# Patient Record
Sex: MALE | Race: WHITE | NOT HISPANIC OR LATINO | Employment: UNEMPLOYED | URBAN - METROPOLITAN AREA
[De-identification: names, ages, dates, MRNs, and addresses within clinical notes are randomized per-mention and may not be internally consistent; named-entity substitution may affect disease eponyms.]

---

## 2018-05-02 ENCOUNTER — TELEPHONE (OUTPATIENT)
Dept: PEDIATRIC NEUROLOGY | Facility: CLINIC | Age: 18
End: 2018-05-02

## 2018-05-02 ENCOUNTER — OFFICE VISIT (OUTPATIENT)
Dept: FAMILY MEDICINE | Facility: CLINIC | Age: 18
End: 2018-05-02
Payer: MEDICAID

## 2018-05-02 ENCOUNTER — TELEPHONE (OUTPATIENT)
Dept: FAMILY MEDICINE | Facility: CLINIC | Age: 18
End: 2018-05-02

## 2018-05-02 VITALS
DIASTOLIC BLOOD PRESSURE: 58 MMHG | HEIGHT: 63 IN | HEART RATE: 68 BPM | SYSTOLIC BLOOD PRESSURE: 90 MMHG | RESPIRATION RATE: 16 BRPM | BODY MASS INDEX: 22.19 KG/M2 | WEIGHT: 125.25 LBS

## 2018-05-02 DIAGNOSIS — R56.9 SEIZURE: Primary | ICD-10-CM

## 2018-05-02 LAB
ALBUMIN SERPL BCP-MCNC: 4.5 G/DL
ALP SERPL-CCNC: 76 U/L
ALT SERPL W/O P-5'-P-CCNC: 71 U/L
AMPHETAMINE, QUAL, UR: NEGATIVE
ANION GAP SERPL CALC-SCNC: 9 MMOL/L
AST SERPL-CCNC: 44 U/L
BASOPHILS # BLD AUTO: 0.03 K/UL
BASOPHILS NFR BLD: 0.7 %
BILIRUB SERPL-MCNC: 0.6 MG/DL
BUN SERPL-MCNC: 13 MG/DL
CALCIUM SERPL-MCNC: 10.2 MG/DL
CHLORIDE SERPL-SCNC: 103 MMOL/L
CO2 SERPL-SCNC: 26 MMOL/L
COCAINE (METABOLITE), QUAL, UR: NEGATIVE
CREAT SERPL-MCNC: 0.8 MG/DL
DIFFERENTIAL METHOD: ABNORMAL
EOSINOPHIL # BLD AUTO: 0.1 K/UL
EOSINOPHIL NFR BLD: 1.6 %
ERYTHROCYTE [DISTWIDTH] IN BLOOD BY AUTOMATED COUNT: 13.2 %
EST. GFR  (AFRICAN AMERICAN): >60 ML/MIN/1.73 M^2
EST. GFR  (NON AFRICAN AMERICAN): >60 ML/MIN/1.73 M^2
GLUCOSE SERPL-MCNC: 85 MG/DL
HCT VFR BLD AUTO: 45.1 %
HGB BLD-MCNC: 15.5 G/DL
LYMPHOCYTES # BLD AUTO: 2 K/UL
LYMPHOCYTES NFR BLD: 47.3 %
MCH RBC QN AUTO: 31.1 PG
MCHC RBC AUTO-ENTMCNC: 34.4 G/DL
MCV RBC AUTO: 91 FL
METHAMPHETAMINE, URINE SCREEN: NEGATIVE
MONOCYTES # BLD AUTO: 0.4 K/UL
MONOCYTES NFR BLD: 8.7 %
NEUTROPHILS # BLD AUTO: 1.8 K/UL
NEUTROPHILS NFR BLD: 41.7 %
OPIATE SCREEN, URINE: NEGATIVE
PLATELET # BLD AUTO: 263 K/UL
PMV BLD AUTO: 12.5 FL
POTASSIUM SERPL-SCNC: 4.1 MMOL/L
PROT SERPL-MCNC: 7.7 G/DL
RBC # BLD AUTO: 4.98 M/UL
SODIUM SERPL-SCNC: 138 MMOL/L
THC, UR: POSITIVE
WBC # BLD AUTO: 4.27 K/UL

## 2018-05-02 PROCEDURE — 99999 PR PBB SHADOW E&M-EST. PATIENT-LVL III: CPT | Mod: PBBFAC,,, | Performed by: FAMILY MEDICINE

## 2018-05-02 PROCEDURE — 85025 COMPLETE CBC W/AUTO DIFF WBC: CPT

## 2018-05-02 PROCEDURE — 93010 ELECTROCARDIOGRAM REPORT: CPT | Mod: ,,, | Performed by: INTERNAL MEDICINE

## 2018-05-02 PROCEDURE — 99215 OFFICE O/P EST HI 40 MIN: CPT | Mod: S$PBB,,, | Performed by: FAMILY MEDICINE

## 2018-05-02 PROCEDURE — 80305 DRUG TEST PRSMV DIR OPT OBS: CPT | Mod: PBBFAC | Performed by: FAMILY MEDICINE

## 2018-05-02 PROCEDURE — 93005 ELECTROCARDIOGRAM TRACING: CPT | Mod: PBBFAC | Performed by: FAMILY MEDICINE

## 2018-05-02 PROCEDURE — 99213 OFFICE O/P EST LOW 20 MIN: CPT | Mod: PBBFAC | Performed by: FAMILY MEDICINE

## 2018-05-02 PROCEDURE — 80053 COMPREHEN METABOLIC PANEL: CPT

## 2018-05-02 PROCEDURE — 36415 COLL VENOUS BLD VENIPUNCTURE: CPT | Mod: PBBFAC

## 2018-05-02 RX ORDER — DIAZEPAM 2.5 MG/.5ML
2.5 GEL RECTAL DAILY PRN
Qty: 1 EACH | Refills: 1 | Status: SHIPPED | OUTPATIENT
Start: 2018-05-02 | End: 2018-05-18

## 2018-05-02 NOTE — TELEPHONE ENCOUNTER
----- Message from Mercedes Cisneros sent at 5/2/2018  9:55 AM CDT -----  Contact: Kelsy rosen/ Dr Unger [phone]  Needs Medical Advice    Who Called: Kelsy rosen/ Dr Unger [phone]  Symptoms (please be specific):    How long has patient had these symptoms:    Pharmacy name and phone # if needed:    Communication Preference (Call Back # and timeframe):  Additional Information:   Would like to know if the  will be willing to see the Pt as a NP although he is an adult. Please call to advise

## 2018-05-02 NOTE — TELEPHONE ENCOUNTER
Pediatric Neurology/[facility] has declined pt, over 16yr of age, Adult Neuro has pt pt on waiting list, and will send pt message when appt is available.

## 2018-05-02 NOTE — PROGRESS NOTES
Subjective:       Patient ID: Reed Middleton is a 18 y.o. male.    Chief Complaint: Follow-up and Generalized Body Aches    HPI  18 year old male comes in with mom c/o recurrence of seizure like activity. He, as a child, was diagnosed with epilepsy and was treated in [id number] because of this. Mom describes seizures as complex partial seizures that were never identified on eeg. He has never had an MRI and was placed on medication by his neurologist when he was in [place]. This medication seemed to have worsened his symptoms so he stopped it. In February of 2012 he had his last episode. Since being in [place] he has really had no issues. His only other medical history is of adhd and it should be noted that he also had 504 accommodations. Over the last 3 months he has had increased seizure like activity. He says his vision decreases and then he feels like he will fall. He does actually lose consciousness and has had biting of the tongue. Witnessed episodes by mom look like flailing of his arms and then some fatigue and confusion afterwards. He seems to want to get his clothes off and has some awareness of what is going on because he has nausea and vomiting during these episodes. No bowel/bladder incontinence. No new medications. He occasionally drinks alcohol and smokes marijuana.    PMH, PSH, ALLERGIES, SH, FH reviewed in nurse's notes above  Medications reconciled in the nurse's notes    Review of Systems   Constitutional: Negative for chills and fever.   HENT: Negative for congestion, ear pain, postnasal drip, rhinorrhea, sore throat and trouble swallowing.    Eyes: Negative for redness and itching.   Respiratory: Negative for cough, shortness of breath and wheezing.    Cardiovascular: Negative for chest pain and palpitations.   Gastrointestinal: Positive for vomiting. Negative for abdominal pain, diarrhea and nausea.   Genitourinary: Negative for dysuria and frequency.   Skin: Negative for rash.    Neurological: Positive for seizures. Negative for weakness and headaches.       Objective:      Physical Exam   Constitutional: He is oriented to person, place, and time. He appears well-developed. No distress.   HENT:   Head: Normocephalic and atraumatic.   Eyes: Conjunctivae are normal. Pupils are equal, round, and reactive to light.   Neck: Normal range of motion. Neck supple. No thyromegaly present.   Cardiovascular: Normal rate, regular rhythm, normal heart sounds and intact distal pulses.    Pulmonary/Chest: Effort normal and breath sounds normal. No respiratory distress. He has no wheezes.   Abdominal: Soft. Bowel sounds are normal. There is no tenderness.   Musculoskeletal: Normal range of motion. He exhibits no edema.   Lymphadenopathy:     He has no cervical adenopathy.   Neurological: He is alert and oriented to person, place, and time.   Skin: Skin is warm and dry. No rash noted.   Psychiatric: He has a normal mood and affect. His behavior is normal.   Nursing note and vitals reviewed.       Assessment/Plan:       Problem List Items Addressed This Visit     None      Visit Diagnoses     Seizure    -  Primary    Relevant Orders    POCT rapid drug screen (Completed)    POCT Urine Sediment Exam    POCT URINE DIPSTICK WITH MICROSCOPE, AUTOMATED    CBC auto differential    Comprehensive metabolic panel    MRI Brain W WO Contrast    Ambulatory referral to Neurology    EKG 12-lead (Completed)    Holter monitor - 48 hour      RTC if condition acutely worsens or any other concerns, otherwise RTC as scheduled in 1 week

## 2018-05-10 ENCOUNTER — HOSPITAL ENCOUNTER (OUTPATIENT)
Dept: RADIOLOGY | Facility: HOSPITAL | Age: 18
Discharge: HOME | End: 2018-05-10
Attending: FAMILY MEDICINE
Payer: MEDICAID

## 2018-05-10 ENCOUNTER — HOSPITAL ENCOUNTER (OUTPATIENT)
Dept: PULMONOLOGY | Facility: HOSPITAL | Age: 18
Discharge: HOME | End: 2018-05-10
Attending: FAMILY MEDICINE
Payer: MEDICAID

## 2018-05-10 DIAGNOSIS — R56.9 SEIZURE: ICD-10-CM

## 2018-05-10 PROCEDURE — A9585 GADOBUTROL INJECTION: HCPCS | Performed by: FAMILY MEDICINE

## 2018-05-10 PROCEDURE — 70553 MRI BRAIN STEM W/O & W/DYE: CPT | Mod: TC

## 2018-05-10 PROCEDURE — 25500020 PHARM REV CODE 255: Performed by: FAMILY MEDICINE

## 2018-05-10 PROCEDURE — 93227 XTRNL ECG REC<48 HR R&I: CPT | Mod: S$PBB,,, | Performed by: INTERNAL MEDICINE

## 2018-05-10 PROCEDURE — 93226 XTRNL ECG REC<48 HR SCAN A/R: CPT

## 2018-05-10 PROCEDURE — 70553 MRI BRAIN STEM W/O & W/DYE: CPT | Mod: 26,,, | Performed by: RADIOLOGY

## 2018-05-10 RX ORDER — GADOBUTROL 604.72 MG/ML
5 INJECTION INTRAVENOUS
Status: COMPLETED | OUTPATIENT
Start: 2018-05-10 | End: 2018-05-10

## 2018-05-10 RX ADMIN — GADOBUTROL 5 ML: 604.72 INJECTION INTRAVENOUS at 10:05

## 2018-05-11 ENCOUNTER — TELEPHONE (OUTPATIENT)
Dept: FAMILY MEDICINE | Facility: CLINIC | Age: 18
End: 2018-05-11

## 2018-05-11 NOTE — TELEPHONE ENCOUNTER
Pt on wait list for appt, pt unable to wait, having seizures and positive for mass in brain/MRI, appt made for 5/18/18@9am, at , message left with pt's Mom, with appt date/time/location, verbalizes understanding

## 2018-05-16 ENCOUNTER — TELEPHONE (OUTPATIENT)
Dept: FAMILY MEDICINE | Facility: CLINIC | Age: 18
End: 2018-05-16

## 2018-05-16 NOTE — TELEPHONE ENCOUNTER
Spoke with mom. Says that anca came in to the living room saying 'i know I am going to have a seizure.' he then laid down on the floor. He put one arm behind him and then postured for about 30-40 sec. Afterwards, he was disoriented and vomited profusely. 30 minutes later he was back to normal. He has a neurology appt on Friday. EMS was called and his vitals were fine.   jeanine

## 2018-05-16 NOTE — TELEPHONE ENCOUNTER
----- Message from Laly Echols MA sent at 2018  1:27 PM CDT -----  Contact: MOther-Loni Middleton  MRN: [id number]  : 2000  PCP: Laly Unger  Home Phone      [phone]  Work Phone      Not on file.  Mobile          [phone]      MESSAGE: Mother states he had a horrible seizure today. She called 911, they didn't take him to the hospital.  And wants to know if Dr Unger can help get him in with the specialist tomorrow.  Phone: [phone]

## 2018-05-18 ENCOUNTER — OFFICE VISIT (OUTPATIENT)
Dept: NEUROLOGY | Facility: CLINIC | Age: 18
End: 2018-05-18
Payer: MEDICAID

## 2018-05-18 ENCOUNTER — HOSPITAL ENCOUNTER (OUTPATIENT)
Dept: NEUROLOGY | Facility: CLINIC | Age: 18
Discharge: HOME | End: 2018-05-18
Payer: MEDICAID

## 2018-05-18 VITALS
BODY MASS INDEX: 22.54 KG/M2 | HEIGHT: 63 IN | HEART RATE: 52 BPM | WEIGHT: 127.19 LBS | SYSTOLIC BLOOD PRESSURE: 115 MMHG | DIASTOLIC BLOOD PRESSURE: 59 MMHG

## 2018-05-18 DIAGNOSIS — G40.309 EPILEPSY SEIZURE, GENERALIZED, CONVULSIVE: ICD-10-CM

## 2018-05-18 DIAGNOSIS — G40.309 EPILEPSY SEIZURE, GENERALIZED, CONVULSIVE: Primary | ICD-10-CM

## 2018-05-18 PROCEDURE — 99212 OFFICE O/P EST SF 10 MIN: CPT | Mod: PBBFAC,25 | Performed by: PSYCHIATRY & NEUROLOGY

## 2018-05-18 PROCEDURE — 99205 OFFICE O/P NEW HI 60 MIN: CPT | Mod: S$PBB,,, | Performed by: PSYCHIATRY & NEUROLOGY

## 2018-05-18 PROCEDURE — 99999 PR PBB SHADOW E&M-EST. PATIENT-LVL II: CPT | Mod: PBBFAC,,, | Performed by: PSYCHIATRY & NEUROLOGY

## 2018-05-18 PROCEDURE — 95816 EEG AWAKE AND DROWSY: CPT | Mod: PBBFAC | Performed by: PSYCHIATRY & NEUROLOGY

## 2018-05-18 PROCEDURE — 95813 EEG EXTND MNTR 61-119 MIN: CPT | Mod: 26,S$PBB,, | Performed by: PSYCHIATRY & NEUROLOGY

## 2018-05-18 PROCEDURE — 95813 PR EEG, EXTENDED, 61-119 MINS: ICD-10-PCS | Mod: 26,S$PBB,, | Performed by: PSYCHIATRY & NEUROLOGY

## 2018-05-18 RX ORDER — ZONISAMIDE 100 MG/1
300 CAPSULE ORAL NIGHTLY
Qty: 90 CAPSULE | Refills: 11 | Status: SHIPPED | OUTPATIENT
Start: 2018-05-18 | End: 2019-04-01

## 2018-05-18 NOTE — PROGRESS NOTES
Name: Reed Middleton  MRN: [id number]   CSN: [id number]      Date: 05/18/2018    HISTORY OF PRESENT ILLNESS (HPI)  The patient is a 18 y.o. WM.  The patient was accompanied today by his mother who provided some of the history.    Reed comes in with his mother today for help regarding control of seizures.    Apparently, Reed had seizures as a child, particularly when he was about 10   years old for about a year.  He struggled with seizures and had been medicated,   but seemed to outgrow them and went somewhere between four and six years off   medications with no seizures as a teenager.  Apparently, his seizures have   returned in the past two months, however, and he has had four to six seizures so   far this year.  His last seizure occurred last Wednesday, which was apparently   a generalized tonic-clonic seizure resulting in a two hour postictal period as   well.    In the past, Reed was treated with carbamazepine back in 2012, but he states   that this made his seizures worse.  He has had generalized tonic-clonic seizures   as well as staring spells, but I cannot see that he has been formally diagnosed   with a seizure disorder thus far.  It is unclear whether he had complex partial   epilepsy or perhaps primary generalized epilepsy.  He has had an aura in the   past of a feeling that a seizure is eminent.  At times, he lies down on the   floor prior to the onset of the seizure before going into tonic posturing.  He   has also vomited either during or immediately after seizures in the past.  He   has had tongue biting, which has been moderately severe involving the lateral   aspect of the tongue.  The convulsive activity is described as flailing of the   arms and stiffening and he also seems to want to move his clothes at times   during the seizures.  He occasionally drinks alcohol and smokes marijuana.      DEVON/KRYSTIAN  dd: 05/22/2018 16:13:19 (CDT)  td: 05/22/2018 16:56:49 (CDT)  Doc ID   #[id number]  Job ID  #[id number]    CC:       [id number]          Seizure Triggers  Sleep Deprivation - None  Other medications - None  Psych/stress - None  Photic stimulation - None  Hyperventilation - None  Medical Problems - None  Menses - No  Sensory Stimulation (light, sound, etc) - None  Missed dose of meds - None    AED Treatments  Present regimen  None    Prior treatments  CBZ    Not tried  acetazolamide (Diamox, AZM)  amantadine  carbamazepine (Tegretol, CBZ)  clobezam (Onfi or Frizium, CLB)  ethosuximide (Zarontin, ESM)  eslicarbazine (Aptiom, ESL)  felbamate (felbatol, FBM)  gabapentin (Neurontin, GBP)  lacosamide (Vimpat, LCM)   lamotrigine (Lamictal, LTG)   levetiracetam (Keppra, LEV)  methsuximide (Celontin, MSM)  methyphenytoin (Mesantion, MHT)  oxcarbazepine (Trileptal OXC)  perampanel (Fycompa, FCP)   phenobarbital (Pb)  phenytoin (Dilantin, PHT)  pregabalin (Lyrica, PGB)  primidone (Mysoline, PRM)  retigabine (Potiga, RTG)  rufinamide (Banzel, RUF)  tiagabine (Gabatril,  TGB)  topiramate (Topamax, TPM)  viagabatrin, (Sabril, VGB)  vagal nerve stimulator (VNS)  valproic acid (Depakote, VPA)  zonisamide (Zonegran, ZNS)  Benzodiazepines  diazepam - rectal (Diastatl)  diazepam - oral (Valium, DZ)  clonazepam (Klonopin, CZP)  clorazepate (Tranxene, CLZ)  Ativan  Brain Stimulation  Vagal Nerve Stimulation-n/a  DBS- n/a    Compliance method  Memory - yes  Mom or Spouse - Yes  Pill Box - no  Ant calendar - no  Turn over medication bottle - no  Phone alarm - no    Seizure Evaluation  EEG Routine -   EEG Ambulatory -   EEG\Video Monitoring -   MRI/MRA -   CT/CTA Scan -   PET Scan -   Neuropsychological evaluation -   DEXA Scan    Potential Epilepsy Risk Factors:   Pregnancy/Labor/Delivery - full term uncomplicated pregnancy labor and vaginal delivery  Febrile seizures - none  Head injury  - none  CNS infection - none     Stroke - none  Family Hx of Sz - none    PAST MEDICAL HISTORY:   Active Ambulatory Problems     Diagnosis Date Noted     No Active Ambulatory Problems     Resolved Ambulatory Problems     Diagnosis Date Noted    No Resolved Ambulatory Problems     Past Medical History:   Diagnosis Date    ADHD (attention deficit hyperactivity disorder)     Epilepsy         PAST SURGICAL HISTORY:   Past Surgical History:   Procedure Laterality Date    ADENOIDECTOMY      TONSILLECTOMY          FAMILY HISTORY:   Family History   Problem Relation Age of Onset    Hypertension Father     Heart disease Father          SOCIAL HISTORY:   Social History     Social History    Marital status: Single     Spouse name: N/A    Number of children: N/A    Years of education: N/A     Occupational History    Not on file.     Social History Main Topics    Smoking status: Never Smoker    Smokeless tobacco: Not on file    Alcohol use Not on file    Drug use: Unknown    Sexual activity: Not on file     Other Topics Concern    Not on file     Social History Narrative    No narrative on file        SUBSTANCE USE:  Social History     Social History Main Topics    Smoking status: Never Smoker    Smokeless tobacco: Not on file    Alcohol use Not on file    Drug use: Unknown    Sexual activity: Not on file      Social History   Substance Use Topics    Smoking status: Never Smoker    Smokeless tobacco: Not on file    Alcohol use Not on file        ALLERGIES: Patient has no known allergies.       Review of Systems   Constitutional: Negative for chills, diaphoresis, fever, malaise/fatigue and weight loss.   HENT: Negative for ear pain, hearing loss, nosebleeds and tinnitus.    Eyes: Negative for blurred vision, double vision, photophobia and pain.   Respiratory: Negative for cough, hemoptysis and shortness of breath.    Cardiovascular: Negative for chest pain, palpitations, orthopnea and leg swelling.   Gastrointestinal: Negative for abdominal pain, blood in stool, constipation, diarrhea, heartburn, nausea and vomiting.   Genitourinary: Negative for  "dysuria and hematuria.   Musculoskeletal: Negative for falls, joint pain and myalgias.   Skin: Negative for itching and rash.   Neurological: Positive for headaches. Negative for dizziness, tingling, tremors, sensory change, speech change, focal weakness, loss of consciousness and weakness.   Endo/Heme/Allergies: Negative for environmental allergies. Does not bruise/bleed easily.   Psychiatric/Behavioral: Negative for depression, hallucinations, memory loss and substance abuse. The patient is not nervous/anxious and does not have insomnia.          PHYSICAL EXAM:    BP (!) 115/59   Pulse (!) 52   Ht 5' 3" (1.6 m)   Wt 57.7 kg (127 lb 3.3 oz)   BMI 22.53 kg/m²       Neurologic Exam      Higher Cortical Function:    Patient is a well developed, pleasant, well groomed individual appearing their stated age  Oriented - intact to person, place and time    Fund of knowledge was appropriate.    Language - Speech was fluent without evidence for an aphasia.  R-L Orientation - Intact   Agnosias, agraphesthesia, or astereognosis - not present.   Cranial Nerves II - XII:    EOMs were intact with normal smooth and no nystagmus.    PERRLA.  Motor - facial movement was symmetrical and normal.    Facial sensory - Light touch and pin prick sensations were normal.    Hearing was normal.  Palate moved well and was symmetrical    Tongue movement was full & the patient could speak without difficulty.  Motor: Power, bulk and tone were normal in all extremities.  Coordination:  Normal  Gait:  Normal  Tremor: resting, postural, intentional - none  Cardiovascular:  No edema  Skin: No rash         IMPRESSION/PLAN:  Reed had seizures as a child and after a honeymoon period has apparently begun   re-experiencing seizures including generalized tonic-clonic seizures over the   past few months.  He has had four to six seizures this year, most of which have   been convulsive.  The differential diagnosis does include nonepileptic events,   but " this seems less likely given the history and semiology and tongue biting.    He did poorly on carbamazepine in the past, which suggests that he might have a   primary generalized epilepsy, particularly with the age of onset here.  At this   point, I recommend obtaining a prolonged EEG during which time we can hopefully   capture some sleep and if he does have a primary generalized syndrome, this may   be sufficient for diagnosis.  If that study is unhelpful, then we may need to   consider video EEG monitoring.  I will also prescribe a new anticonvulsant for   Reed today as he is not currently treated with regular therapy.  I am going to   begin zonisamide at a dose of 300 mg at bedtime initially and order labs after   he reaches a stable level in a couple of weeks.  We will also check other labs   including vitamin levels at that point.  Depending on the results of the EEG, we   will decide whether to proceed with video EEG monitoring and depending on his   clinical response to zonisamide, we may further titrate upward with guidance of   the blood level.  Reed also complains of occasional headaches and so hopefully   the zonisamide will help as a headache preventative medicine as well.      MEGAN  dd: 05/22/2018 16:17:43 (CDT)  td: 05/22/2018 17:06:38 (CDT)  Doc ID   #[id number]  Job ID #[id number]    CC:       [id number]

## 2018-05-21 NOTE — PROCEDURES
DATE OF SERVICE:  05/18/2018    EEG NUMBER:  OC .    REQUESTED BY:  Dr. Ferrara.    ELECTROENCEPHALOGRAM REPORT     METHODOLOGY:  Electroencephalographic (EEG) recording is recorded with   electrodes placed according to the International 10-20 placement system.  Thirty   two (32) channels of digital signal (sampling rate of 512/sec), including T1   and T2, were simultaneously recorded from the scalp and may include EKG, EMG,   and/or eye monitors.  Recording band pass was 0.1 to 512 Hz.  Digital video   recording of the patient is simultaneously recorded with the EEG.  The patient   is instructed to report clinical symptoms which may occur during the recording   session.  EEG and video recording are stored and archived in digital format.    Activation procedures, which include photic stimulation, hyperventilation and   instructing patients to perform simple tasks, are done in selected patients.    The EEG is displayed on a monitor screen and can be reviewed using different   montages.  Computer assisted-analysis is employed to detect spike and   electrographic seizure activity.   The entire record is submitted for computer   analysis.  The entire recording is visually reviewed, and the times identified   by computer analysis as being spikes or seizures are reviewed again.    Compressed spectral analysis (CSA) is also performed on the activity recorded   from each individual channel.  This is displayed as a power display of   frequencies from 0 to 30 Hz over time.   The CSA is reviewed looking for   asymmetries in power between homologous areas of the scalp, then compared with   the original EEG recording.    Bluwan software was also utilized in the review of this study.  This software   suite analyzes the EEG recording in multiple domains.  Coherence and rhythmicity   are computed to identify EEG sections which may contain organized seizures.    Each channel undergoes analysis to detect the presence of spike  and sharp waves   which have special and morphological characteristics of epileptic activity.  The   routine EEG recording is converted from special into frequency domain.  This is   then displayed comparing homologous areas to identify areas of significant   asymmetry.  Algorithm to identify non-cortically generated artifact is used to   separate artifact from the EEG.      RECORDING TIMES:  Start on 05/18/2018 at 11:4 and end at 12:14.  A total of 1 hour and 10 minutes of EEG recording was obtained.    EEG FINDINGS:  The patient was awake and near the onset and a very rhythmic 10   Hz posterior dominant rhythm was seen in the occipital leads with some spread at   times into the posterior temporal area.  Irregular beta was present diffusely.    Sleep was encountered with a posterior dominant rhythm being replaced by   generalized mixture of midrange theta and beta activity.  This was later   punctuated by V waves, sleep spindles and generalized bursts of vertex maximum   delta.  The waking and sleeping background was symmetrical and there were no   lateralized or focal changes and no spike or sharp wave activity seen.    Activation procedures consisted of 3 minutes of hyperventilation, which produced   some mild disorganization, which was symmetrical, which normalized quickly   after overbreathing ceased.  Intermittent photic stimulation was carried out,   which produced a moderate driving response without provoking pathological   discharges.    IMPRESSION:  Normal waking and sleeping EEG.    CLINICAL CORRELATION:  The patient is a 18-year-old male with clinical events   which have been thought to be either epilepsy with focal onset and secondary   generalization or nonepileptic events.  This tracing shows no evidence of   cortical dysfunction nor an irritative process.      RR/HN  dd: 05/21/2018 15:05:59 (CDT)  td: 05/21/2018 15:43:25 (CDT)  Doc ID   #[id number]  Job ID #[id number]    CC:

## 2018-05-25 ENCOUNTER — TELEPHONE (OUTPATIENT)
Dept: FAMILY MEDICINE | Facility: CLINIC | Age: 18
End: 2018-05-25

## 2018-06-12 ENCOUNTER — OFFICE VISIT (OUTPATIENT)
Dept: FAMILY MEDICINE | Facility: CLINIC | Age: 18
End: 2018-06-12
Payer: MEDICAID

## 2018-06-12 VITALS
HEART RATE: 60 BPM | RESPIRATION RATE: 16 BRPM | BODY MASS INDEX: 21.83 KG/M2 | DIASTOLIC BLOOD PRESSURE: 54 MMHG | SYSTOLIC BLOOD PRESSURE: 90 MMHG | WEIGHT: 123.25 LBS

## 2018-06-12 DIAGNOSIS — G40.909 SEIZURE DISORDER: Primary | ICD-10-CM

## 2018-06-12 DIAGNOSIS — G40.309 EPILEPSY SEIZURE, GENERALIZED, CONVULSIVE: ICD-10-CM

## 2018-06-12 DIAGNOSIS — R74.8 ELEVATED LIVER ENZYMES: ICD-10-CM

## 2018-06-12 LAB
ALBUMIN SERPL BCP-MCNC: 4.3 G/DL
ALP SERPL-CCNC: 72 U/L
ALT SERPL W/O P-5'-P-CCNC: 28 U/L
ANION GAP SERPL CALC-SCNC: 9 MMOL/L
AST SERPL-CCNC: 23 U/L
BILIRUB SERPL-MCNC: 0.5 MG/DL
BUN SERPL-MCNC: 12 MG/DL
CALCIUM SERPL-MCNC: 9.7 MG/DL
CHLORIDE SERPL-SCNC: 113 MMOL/L
CO2 SERPL-SCNC: 20 MMOL/L
CREAT SERPL-MCNC: 0.8 MG/DL
EST. GFR  (AFRICAN AMERICAN): >60 ML/MIN/1.73 M^2
EST. GFR  (NON AFRICAN AMERICAN): >60 ML/MIN/1.73 M^2
GLUCOSE SERPL-MCNC: 95 MG/DL
POTASSIUM SERPL-SCNC: 4.3 MMOL/L
PROT SERPL-MCNC: 7.2 G/DL
SODIUM SERPL-SCNC: 142 MMOL/L

## 2018-06-12 PROCEDURE — 82607 VITAMIN B-12: CPT

## 2018-06-12 PROCEDURE — 84252 ASSAY OF VITAMIN B-2: CPT

## 2018-06-12 PROCEDURE — 99212 OFFICE O/P EST SF 10 MIN: CPT | Mod: PBBFAC | Performed by: FAMILY MEDICINE

## 2018-06-12 PROCEDURE — 82306 VITAMIN D 25 HYDROXY: CPT

## 2018-06-12 PROCEDURE — 36415 COLL VENOUS BLD VENIPUNCTURE: CPT | Mod: PBBFAC

## 2018-06-12 PROCEDURE — 99213 OFFICE O/P EST LOW 20 MIN: CPT | Mod: S$PBB,,, | Performed by: FAMILY MEDICINE

## 2018-06-12 PROCEDURE — 84207 ASSAY OF VITAMIN B-6: CPT

## 2018-06-12 PROCEDURE — 84425 ASSAY OF VITAMIN B-1: CPT

## 2018-06-12 PROCEDURE — 82746 ASSAY OF FOLIC ACID SERUM: CPT

## 2018-06-12 PROCEDURE — 99999 PR PBB SHADOW E&M-EST. PATIENT-LVL II: CPT | Mod: PBBFAC,,, | Performed by: FAMILY MEDICINE

## 2018-06-12 PROCEDURE — 80053 COMPREHEN METABOLIC PANEL: CPT

## 2018-06-12 PROCEDURE — 80203 DRUG SCREEN QUANT ZONISAMIDE: CPT

## 2018-06-12 NOTE — PROGRESS NOTES
Subjective:       Patient ID: Reed Middleton is a 18 y.o. male.    Chief Complaint: Follow-up    Pt is a 18 y.o. male who presents for evaluation and management of   Encounter Diagnoses   Name Primary?    Seizure disorder Yes    Elevated liver enzymes    .Saw Neurology for new seizure activity in this patient that has a h/o seizures as a child. His last seizure was in 2012 before this recent recurrence   Started on zonegran 3 weeks ago with [facility]. No seizure since last visit, but a little nauseated on new med    Labs done by Dr. SYLVESTER last visit showed elevated liver enzymes. He says he was using a lot of tylenol at the time for h/a. He has not used tylenol much in the last couple of weeks. Doesn't drink much ETOH. No other Drug use besides Marijuana     Doing well on current meds. Denies any side effects. Prevention is up to date.    Review of Systems   Gastrointestinal: Positive for nausea.   Neurological: Positive for seizures and headaches.        Headache improved with Zonegran        Objective:      Physical Exam   Constitutional: He is oriented to person, place, and time. He appears well-developed and well-nourished.   HENT:   Head: Normocephalic and atraumatic.   Right Ear: External ear normal.   Left Ear: External ear normal.   Nose: Nose normal.   Mouth/Throat: Oropharynx is clear and moist.   Eyes: Conjunctivae and EOM are normal. Pupils are equal, round, and reactive to light. Right eye exhibits no discharge. Left eye exhibits no discharge. No scleral icterus.   Neck: Normal range of motion. Neck supple. No JVD present. No tracheal deviation present. No thyromegaly present.   Cardiovascular: Normal rate, regular rhythm, normal heart sounds and intact distal pulses.    No murmur heard.  Pulmonary/Chest: Effort normal and breath sounds normal. No respiratory distress. He has no wheezes. He has no rales. He exhibits no tenderness.   Abdominal: Soft. Bowel sounds are normal. He exhibits no  distension and no mass. There is no tenderness. There is no rebound and no guarding.   Musculoskeletal: Normal range of motion.   Lymphadenopathy:     He has no cervical adenopathy.   Neurological: He is alert and oriented to person, place, and time. He has normal reflexes. He displays normal reflexes. No cranial nerve deficit. He exhibits normal muscle tone. Coordination normal.   Skin: Skin is warm and dry.   Psychiatric: He has a normal mood and affect. His behavior is normal. Judgment and thought content normal.       Assessment:       1. Seizure disorder    2. Elevated liver enzymes        Plan:   Reed Larsen was seen today for follow-up.    Diagnoses and all orders for this visit:    Seizure disorder    Elevated liver enzymes  -     Comprehensive metabolic panel; Future      Problem List Items Addressed This Visit     None      Visit Diagnoses     Seizure disorder    -  Primary    Elevated liver enzymes        Relevant Orders    Comprehensive metabolic panel      Will also draw Dr. Ferrara's labs today too. These need to be faxed to his office   If LFT still up, will get U/S   No Follow-up on file.

## 2018-06-13 LAB
25(OH)D3+25(OH)D2 SERPL-MCNC: 38 NG/ML
FOLATE SERPL-MCNC: 10.9 NG/ML
VIT B12 SERPL-MCNC: 311 PG/ML

## 2018-06-15 ENCOUNTER — TELEPHONE (OUTPATIENT)
Dept: FAMILY MEDICINE | Facility: CLINIC | Age: 18
End: 2018-06-15

## 2018-06-15 LAB
PYRIDOXAL SERPL-MCNC: 16 UG/L (ref 5–50)
ZONISAMIDE SERPL-MCNC: 19 MCG/ML (ref 10–40)

## 2018-06-15 NOTE — TELEPHONE ENCOUNTER
Spoke to Soni with sendout lab, states Vitamin B1 requires whole blood and lab received plasma.     Vitamin B1 ordered per Dr. Navdeep Ferrara.   Notified Betsey with Dr. Navdeep Rivers office that results ready to be reviewed but also that Vitamin B1 will need to be reordered and recollected if Dr. Ferrara would still like to have testing done.

## 2018-06-15 NOTE — TELEPHONE ENCOUNTER
----- Message from Cherelle Tang sent at 6/14/2018  4:35 PM CDT -----  Regarding: lab test cancellation/  recollection notification  There was an issue with a test ordered on this patient from 06/12/2018.  Please call the Sendout lab as soon as possible at [phone] ext. [phone] for complete details.  Anyone in the Sendout lab will be able to assist you with further information

## 2018-06-18 ENCOUNTER — TELEPHONE (OUTPATIENT)
Dept: FAMILY MEDICINE | Facility: CLINIC | Age: 18
End: 2018-06-18

## 2018-06-18 NOTE — TELEPHONE ENCOUNTER
----- Message from Cherelle Tang sent at 6/18/2018  9:55 AM CDT -----  Regarding: lab test cancellation/ recollection notification  There was an issue with the Vitamin B2 test ordered on this patient from 6/12/2018.  Please call the Sendout lab as soon as possible at [phone] ext. [phone] for complete details.  Anyone in the Sendout lab will be able to assist you with further information.

## 2018-06-26 ENCOUNTER — TELEPHONE (OUTPATIENT)
Dept: NEUROLOGY | Facility: CLINIC | Age: 18
End: 2018-06-26

## 2018-06-26 NOTE — TELEPHONE ENCOUNTER
----- Message from Nancy Moreno sent at 6/25/2018  5:04 PM CDT -----  Contact: patient  Patient called to schedule an appointment specifically with Dr Ferrara  And wishes to speak with a nurse regarding this matter.     He can be reached at [phone]      Thanks  KB

## 2018-07-05 LAB — VIT B2 SERPL-MCNC: NORMAL UG/DL

## 2018-07-12 ENCOUNTER — OFFICE VISIT (OUTPATIENT)
Dept: FAMILY MEDICINE | Facility: CLINIC | Age: 18
End: 2018-07-12
Payer: MEDICAID

## 2018-07-12 VITALS
DIASTOLIC BLOOD PRESSURE: 62 MMHG | HEIGHT: 63 IN | WEIGHT: 120.38 LBS | HEART RATE: 80 BPM | RESPIRATION RATE: 18 BRPM | SYSTOLIC BLOOD PRESSURE: 110 MMHG | BODY MASS INDEX: 21.33 KG/M2

## 2018-07-12 DIAGNOSIS — G40.309 EPILEPSY SEIZURE, GENERALIZED, CONVULSIVE: ICD-10-CM

## 2018-07-12 DIAGNOSIS — I49.3 FREQUENT PVCS: Primary | ICD-10-CM

## 2018-07-12 PROCEDURE — 83735 ASSAY OF MAGNESIUM: CPT

## 2018-07-12 PROCEDURE — 99999 PR PBB SHADOW E&M-EST. PATIENT-LVL III: CPT | Mod: PBBFAC,,, | Performed by: FAMILY MEDICINE

## 2018-07-12 PROCEDURE — 99213 OFFICE O/P EST LOW 20 MIN: CPT | Mod: PBBFAC | Performed by: FAMILY MEDICINE

## 2018-07-12 PROCEDURE — 36415 COLL VENOUS BLD VENIPUNCTURE: CPT | Mod: PBBFAC

## 2018-07-12 PROCEDURE — 99213 OFFICE O/P EST LOW 20 MIN: CPT | Mod: S$PBB,,, | Performed by: FAMILY MEDICINE

## 2018-07-12 PROCEDURE — 80053 COMPREHEN METABOLIC PANEL: CPT

## 2018-07-12 NOTE — PROGRESS NOTES
Subjective:       Patient ID: Reed Middleton is a 18 y.o. male.    Chief Complaint: Follow-up (1 mo)    HPI  18 year old male comes in for f/u of seizures. He has seen the neurologist and started on zonegran. He says that he was ramped up to 300mg and had a lot of side effects. He does note that his seizures have been controlled, though. Because of the side effects of depression, stuttering, abdominal pain and speech problems, he cut back to 200mg. He has had no more grand mal type seizures but does describe times where he has vision problems that resolve within 1 minute. This is not as frequent as prior to starting the medication, but more frequent now that he has cut back to 200mg.    PMH, PSH, ALLERGIES, SH, FH reviewed in nurse's notes above  Medications reconciled in the nurse's notes      Review of Systems   Constitutional: Negative for chills and fever.   HENT: Negative for congestion, ear pain, postnasal drip, rhinorrhea, sore throat and trouble swallowing.    Eyes: Negative for redness and itching.   Respiratory: Negative for cough, shortness of breath and wheezing.    Cardiovascular: Negative for chest pain and palpitations.   Gastrointestinal: Negative for abdominal pain, diarrhea, nausea and vomiting.   Genitourinary: Negative for dysuria and frequency.   Skin: Negative for rash.   Neurological: Positive for seizures. Negative for weakness and headaches.       Objective:      Physical Exam   Constitutional: He is oriented to person, place, and time. He appears well-developed. No distress.   HENT:   Head: Normocephalic and atraumatic.   Eyes: Conjunctivae are normal. Pupils are equal, round, and reactive to light.   Neck: Normal range of motion. Neck supple. No thyromegaly present.   Cardiovascular: Normal rate, regular rhythm, normal heart sounds and intact distal pulses.    Pulmonary/Chest: Effort normal and breath sounds normal. No respiratory distress. He has no wheezes.   Abdominal: Soft.  Bowel sounds are normal. There is no tenderness.   Musculoskeletal: Normal range of motion. He exhibits no edema.   Lymphadenopathy:     He has no cervical adenopathy.   Neurological: He is alert and oriented to person, place, and time.   Skin: Skin is warm and dry. No rash noted.   Psychiatric: He has a normal mood and affect. His behavior is normal.   Nursing note and vitals reviewed.       Assessment/Plan:       Problem List Items Addressed This Visit        Neuro    Epilepsy seizure, generalized, convulsive    Relevant Orders    Comprehensive metabolic panel      Other Visit Diagnoses     Frequent PVCs    -  Primary    Relevant Orders    Comprehensive metabolic panel    Magnesium        RTC if condition acutely worsens or any other concerns, otherwise RTC as scheduled

## 2018-07-13 LAB
ALBUMIN SERPL BCP-MCNC: 4.9 G/DL
ALP SERPL-CCNC: 80 U/L
ALT SERPL W/O P-5'-P-CCNC: 54 U/L
ANION GAP SERPL CALC-SCNC: 6 MMOL/L
AST SERPL-CCNC: 33 U/L
BILIRUB SERPL-MCNC: 1.2 MG/DL
BUN SERPL-MCNC: 16 MG/DL
CALCIUM SERPL-MCNC: 9.6 MG/DL
CHLORIDE SERPL-SCNC: 106 MMOL/L
CO2 SERPL-SCNC: 24 MMOL/L
CREAT SERPL-MCNC: 0.8 MG/DL
EST. GFR  (AFRICAN AMERICAN): >60 ML/MIN/1.73 M^2
EST. GFR  (NON AFRICAN AMERICAN): >60 ML/MIN/1.73 M^2
GLUCOSE SERPL-MCNC: 82 MG/DL
MAGNESIUM SERPL-MCNC: 2.3 MG/DL
POTASSIUM SERPL-SCNC: 4 MMOL/L
PROT SERPL-MCNC: 7.7 G/DL
SODIUM SERPL-SCNC: 136 MMOL/L

## 2018-07-16 ENCOUNTER — TELEPHONE (OUTPATIENT)
Dept: NEUROLOGY | Facility: CLINIC | Age: 18
End: 2018-07-16

## 2018-07-16 NOTE — TELEPHONE ENCOUNTER
----- Message from Aissatou Saldivar RN sent at 7/16/2018  4:18 PM CDT -----  Contact: PT Portal Request      ----- Message -----  From: Shae Rivera  Sent: 7/16/2018   9:11 AM  To: [facility] Staff    Appointment Request From: Reed Middleton    With Provider: Navdeep Ferrara MD [[facility] - Neurology Epilepsy]    Would Accept With:Only the person I've selected    Preferred Date Range: From 7/17/2018 To 8/31/2018    Preferred Times: Any    Reason for visit: Request an Appt    Comments:  Need to talk to doctor about meds

## 2018-07-18 ENCOUNTER — TELEPHONE (OUTPATIENT)
Dept: NEUROLOGY | Facility: CLINIC | Age: 18
End: 2018-07-18

## 2018-07-18 NOTE — TELEPHONE ENCOUNTER
----- Message from Hilda Restrepo RN sent at 7/18/2018 10:48 AM CDT -----      ----- Message -----  From: Mateo Guidry  Sent: 7/18/2018  10:39 AM  To: [facility]lem [facility] Staff    Patient Returning Call from [facility]    Who Left Message for Patient: Betsey  Communication Preference: Gudelia (mom) @ [phone]  Additional Information:

## 2018-08-13 ENCOUNTER — OFFICE VISIT (OUTPATIENT)
Dept: NEUROLOGY | Facility: CLINIC | Age: 18
End: 2018-08-13
Payer: MEDICAID

## 2018-08-13 VITALS
BODY MASS INDEX: 21.17 KG/M2 | DIASTOLIC BLOOD PRESSURE: 65 MMHG | WEIGHT: 119.5 LBS | HEART RATE: 49 BPM | SYSTOLIC BLOOD PRESSURE: 108 MMHG | HEIGHT: 63 IN

## 2018-08-13 DIAGNOSIS — G40.309 EPILEPSY SEIZURE, GENERALIZED, CONVULSIVE: Primary | ICD-10-CM

## 2018-08-13 PROCEDURE — 99999 PR PBB SHADOW E&M-EST. PATIENT-LVL III: CPT | Mod: PBBFAC,,, | Performed by: PSYCHIATRY & NEUROLOGY

## 2018-08-13 PROCEDURE — 99213 OFFICE O/P EST LOW 20 MIN: CPT | Mod: PBBFAC | Performed by: PSYCHIATRY & NEUROLOGY

## 2018-08-13 PROCEDURE — 99213 OFFICE O/P EST LOW 20 MIN: CPT | Mod: S$PBB,,, | Performed by: PSYCHIATRY & NEUROLOGY

## 2018-08-13 RX ORDER — LAMOTRIGINE 25 MG/1
150 TABLET ORAL DAILY
Qty: 180 TABLET | Refills: 3 | Status: SHIPPED | OUTPATIENT
Start: 2018-08-13 | End: 2018-11-06

## 2018-08-14 ENCOUNTER — PATIENT MESSAGE (OUTPATIENT)
Dept: NEUROLOGY | Facility: CLINIC | Age: 18
End: 2018-08-14

## 2018-08-16 ENCOUNTER — PATIENT MESSAGE (OUTPATIENT)
Dept: NEUROLOGY | Facility: CLINIC | Age: 18
End: 2018-08-16

## 2018-08-20 NOTE — PROGRESS NOTES
8/13/18:  Unable to tolerate ZNS due to sedation and related SE  Efficacy has been good with both seizures and HA controlled  However, he is unable to tolerate the ZNS even at 200mg per day    Date: 05/18/2018     HISTORY OF PRESENT ILLNESS (HPI)  The patient is a 18 y.o. WM.  The patient was accompanied today by his mother who provided some of the history.     Reed comes in with his mother today for help regarding control of seizures.    Apparently, Reed had seizures as a child, particularly when he was about 10   years old for about a year.  He struggled with seizures and had been medicated,   but seemed to outgrow them and went somewhere between four and six years off   medications with no seizures as a teenager.  Apparently, his seizures have   returned in the past two months, however, and he has had four to six seizures so   far this year.  His last seizure occurred last Wednesday, which was apparently   a generalized tonic-clonic seizure resulting in a two hour postictal period as   well.     In the past, Reed was treated with carbamazepine back in 2012, but he states   that this made his seizures worse.  He has had generalized tonic-clonic seizures   as well as staring spells, but I cannot see that he has been formally diagnosed   with a seizure disorder thus far.  It is unclear whether he had complex partial   epilepsy or perhaps primary generalized epilepsy.  He has had an aura in the   past of a feeling that a seizure is eminent.  At times, he lies down on the   floor prior to the onset of the seizure before going into tonic posturing.  He   has also vomited either during or immediately after seizures in the past.  He   has had tongue biting, which has been moderately severe involving the lateral   aspect of the tongue.  The convulsive activity is described as flailing of the   arms and stiffening and he also seems to want to move his clothes at times   during the seizures.  He occasionally drinks  alcohol and smokes marijuana.        NBB/HN  dd: 05/22/2018 16:13:19 (CDT)  td: 05/22/2018 16:56:49 (CDT)  Doc ID   #[id number]  Job ID #[id number]     CC:         [id number]              Seizure Triggers  Sleep Deprivation - None  Other medications - None  Psych/stress - None  Photic stimulation - None  Hyperventilation - None  Medical Problems - None  Menses - No  Sensory Stimulation (light, sound, etc) - None  Missed dose of meds - None     AED Treatments  Present regimen  None     Prior treatments  CBZ     Not tried  acetazolamide (Diamox, AZM)  amantadine  carbamazepine (Tegretol, CBZ)  clobezam (Onfi or Frizium, CLB)  ethosuximide (Zarontin, ESM)  eslicarbazine (Aptiom, ESL)  felbamate (felbatol, FBM)  gabapentin (Neurontin, GBP)  lacosamide (Vimpat, LCM)   lamotrigine (Lamictal, LTG)   levetiracetam (Keppra, LEV)  methsuximide (Celontin, MSM)  methyphenytoin (Mesantion, MHT)  oxcarbazepine (Trileptal OXC)  perampanel (Fycompa, FCP)   phenobarbital (Pb)  phenytoin (Dilantin, PHT)  pregabalin (Lyrica, PGB)  primidone (Mysoline, PRM)  retigabine (Potiga, RTG)  rufinamide (Banzel, RUF)  tiagabine (Gabatril,  TGB)  topiramate (Topamax, TPM)  viagabatrin, (Sabril, VGB)  vagal nerve stimulator (VNS)  valproic acid (Depakote, VPA)  zonisamide (Zonegran, ZNS)  Benzodiazepines  diazepam - rectal (Diastatl)  diazepam - oral (Valium, DZ)  clonazepam (Klonopin, CZP)  clorazepate (Tranxene, CLZ)  Ativan  Brain Stimulation  Vagal Nerve Stimulation-n/a  DBS- n/a     Compliance method  Memory - yes  Mom or Spouse - Yes  Pill Box - no  Ant calendar - no  Turn over medication bottle - no  Phone alarm - no     Seizure Evaluation  EEG Routine -   EEG Ambulatory -   EEG\Video Monitoring -   MRI/MRA -   CT/CTA Scan -   PET Scan -   Neuropsychological evaluation -   DEXA Scan     Potential Epilepsy Risk Factors:   Pregnancy/Labor/Delivery - full term uncomplicated pregnancy labor and vaginal delivery  Febrile seizures - none  Head injury  -  none  CNS infection - none     Stroke - none  Family Hx of Sz - none     PAST MEDICAL HISTORY:        Active Ambulatory Problems     Diagnosis Date Noted    No Active Ambulatory Problems           Resolved Ambulatory Problems     Diagnosis Date Noted    No Resolved Ambulatory Problems           Past Medical History:   Diagnosis Date    ADHD (attention deficit hyperactivity disorder)      Epilepsy           PAST SURGICAL HISTORY:         Past Surgical History:   Procedure Laterality Date    ADENOIDECTOMY        TONSILLECTOMY             FAMILY HISTORY:         Family History   Problem Relation Age of Onset    Hypertension Father      Heart disease Father            SOCIAL HISTORY:   Social History               Social History    Marital status: Single       Spouse name: N/A    Number of children: N/A    Years of education: N/A          Occupational History    Not on file.           Social History Main Topics    Smoking status: Never Smoker    Smokeless tobacco: Not on file    Alcohol use Not on file    Drug use: Unknown    Sexual activity: Not on file           Other Topics Concern    Not on file          Social History Narrative    No narrative on file            SUBSTANCE USE:  Social History           Social History Main Topics    Smoking status: Never Smoker    Smokeless tobacco: Not on file    Alcohol use Not on file    Drug use: Unknown    Sexual activity: Not on file           Social History   Substance Use Topics    Smoking status: Never Smoker    Smokeless tobacco: Not on file    Alcohol use Not on file         ALLERGIES: Patient has no known allergies.         Review of Systems   Constitutional: Negative for chills, diaphoresis, fever, malaise/fatigue and weight loss.   HENT: Negative for ear pain, hearing loss, nosebleeds and tinnitus.    Eyes: Negative for blurred vision, double vision, photophobia and pain.   Respiratory: Negative for cough, hemoptysis and shortness of breath.   "  Cardiovascular: Negative for chest pain, palpitations, orthopnea and leg swelling.   Gastrointestinal: Negative for abdominal pain, blood in stool, constipation, diarrhea, heartburn, nausea and vomiting.   Genitourinary: Negative for dysuria and hematuria.   Musculoskeletal: Negative for falls, joint pain and myalgias.   Skin: Negative for itching and rash.   Neurological: Positive for headaches. Negative for dizziness, tingling, tremors, sensory change, speech change, focal weakness, loss of consciousness and weakness.   Endo/Heme/Allergies: Negative for environmental allergies. Does not bruise/bleed easily.   Psychiatric/Behavioral: Negative for depression, hallucinations, memory loss and substance abuse. The patient is not nervous/anxious and does not have insomnia.             PHYSICAL EXAM:     BP (!) 115/59   Pulse (!) 52   Ht 5' 3" (1.6 m)   Wt 57.7 kg (127 lb 3.3 oz)   BMI 22.53 kg/m²         Neurologic Exam        Higher Cortical Function:    Patient is a well developed, pleasant, well groomed individual appearing their stated age  Oriented - intact to person, place and time    Fund of knowledge was appropriate.    Language - Speech was fluent without evidence for an aphasia.  R-L Orientation - Intact   Agnosias, agraphesthesia, or astereognosis - not present.   Cranial Nerves II - XII:    EOMs were intact with normal smooth and no nystagmus.    PERRLA.  Motor - facial movement was symmetrical and normal.    Facial sensory - Light touch and pin prick sensations were normal.    Hearing was normal.  Palate moved well and was symmetrical    Tongue movement was full & the patient could speak without difficulty.  Motor: Power, bulk and tone were normal in all extremities.  Coordination:  Normal  Gait:  Normal  Tremor: resting, postural, intentional - none  Cardiovascular:  No edema  Skin: No rash        IMPRESSION/PLAN:  Transition from ZNS to LTG over 6 weeks  LTG level after transition   F/U and dose " adjustment after that

## 2018-08-28 ENCOUNTER — TELEPHONE (OUTPATIENT)
Dept: FAMILY MEDICINE | Facility: CLINIC | Age: 18
End: 2018-08-28

## 2018-08-28 DIAGNOSIS — R56.9 SEIZURE: Primary | ICD-10-CM

## 2018-08-28 NOTE — TELEPHONE ENCOUNTER
----- Message from Lori Celeste sent at 2018  4:58 PM CDT -----  Contact: mOTHER  Reed Middleton  MRN: [id number]  : 2000  PCP: Laly Unger  Home Phone      [phone]  Work Phone      Not on file.  [place]          [phone]      MESSAGE:   Patient's mother is requesting Dr. Unger call her as she needs to ask her a question about something. Please advise.    Tiny  [phone]

## 2018-08-28 NOTE — TELEPHONE ENCOUNTER
Patient's mother is requesting Dr. Unger call her as she needs to ask her a question about something. Please advise.     Tiny  [phone]

## 2018-08-28 NOTE — TELEPHONE ENCOUNTER
"Patient with transition from zonegran to lamictal had a "little seizure" today per mom. Will check lamictal level.  No infectious symptoms.  No otc meds.  She will notify neurology tomorrow.  mh  "

## 2018-08-29 ENCOUNTER — TELEPHONE (OUTPATIENT)
Dept: NEUROLOGY | Facility: CLINIC | Age: 18
End: 2018-08-29

## 2018-08-29 NOTE — TELEPHONE ENCOUNTER
"Spoke to Gosia. Reed had a "smalll seizure" yesterday which could be due to changing medications. Advised Tytheron to call if pts condition worsens or if he experiences another seizure   "

## 2018-08-29 NOTE — TELEPHONE ENCOUNTER
----- Message from Mateo Guidry sent at 8/29/2018 12:00 PM CDT -----  Needs Advice    Reason for call: Gudelia is asking to speak w/ the doctor or the nurse about the medication pt is taking for seizures. Gudelia states the pt had a seizure yesterday.     Communication Preference: Gudelia (mom) @ [phone]  Additional Information:

## 2018-09-05 ENCOUNTER — APPOINTMENT (OUTPATIENT)
Dept: RADIOLOGY | Facility: CLINIC | Age: 18
End: 2018-09-05
Attending: FAMILY MEDICINE
Payer: MEDICAID

## 2018-09-05 ENCOUNTER — OFFICE VISIT (OUTPATIENT)
Dept: FAMILY MEDICINE | Facility: CLINIC | Age: 18
End: 2018-09-05
Payer: MEDICAID

## 2018-09-05 VITALS
HEIGHT: 64 IN | RESPIRATION RATE: 20 BRPM | HEART RATE: 60 BPM | SYSTOLIC BLOOD PRESSURE: 100 MMHG | BODY MASS INDEX: 19.68 KG/M2 | DIASTOLIC BLOOD PRESSURE: 70 MMHG | WEIGHT: 115.31 LBS

## 2018-09-05 DIAGNOSIS — M25.512 ACUTE PAIN OF LEFT SHOULDER: ICD-10-CM

## 2018-09-05 DIAGNOSIS — M25.512 ACUTE PAIN OF LEFT SHOULDER: Primary | ICD-10-CM

## 2018-09-05 PROCEDURE — 73030 X-RAY EXAM OF SHOULDER: CPT | Mod: 26,LT,, | Performed by: RADIOLOGY

## 2018-09-05 PROCEDURE — 99999 PR PBB SHADOW E&M-EST. PATIENT-LVL III: CPT | Mod: PBBFAC,,, | Performed by: FAMILY MEDICINE

## 2018-09-05 PROCEDURE — 99213 OFFICE O/P EST LOW 20 MIN: CPT | Mod: PBBFAC,25 | Performed by: FAMILY MEDICINE

## 2018-09-05 PROCEDURE — 99214 OFFICE O/P EST MOD 30 MIN: CPT | Mod: S$PBB,,, | Performed by: FAMILY MEDICINE

## 2018-09-05 PROCEDURE — 73030 X-RAY EXAM OF SHOULDER: CPT | Mod: TC,PO,LT

## 2018-09-05 NOTE — PROGRESS NOTES
Subjective:       Patient ID: Reed Middleton is a 18 y.o. male.    Chief Complaint: Shoulder Pain (Left ) and Nausea    HPI  18 year old male comes in with c/o nausea and suicidal ideations. He is currently being weaned from zonegran because it was too strong - he was having depression, stomach pains, without energy. When he started to wean the meds, he was still having vision loss (which is perceived to be his aura), but his seizures were controlled. Since then, he was started on lamictal and has had trouble finding his words and loss of appetitie. However, yesterday he went up on his meds from 25 to 50 and he says he 'started to have bad thoughts.' He doesn't want to hurt himself and denies suicidal ideations or behaviors but these invasive thoughts have him concerned. The only medication that he is taking currently is ibuprofen. This is because he had a seizure and since then has had pain in the left shoulder. He has decreased range of motion and has to actively abduct past 30 degrees    PMH, PSH, ALLERGIES, SH, FH reviewed in nurse's notes above  Medications reconciled in the nurse's notes      Review of Systems   Constitutional: Negative for chills and fever.   HENT: Negative for congestion, ear pain, postnasal drip, rhinorrhea, sore throat and trouble swallowing.    Eyes: Negative for redness and itching.   Respiratory: Negative for cough, shortness of breath and wheezing.    Cardiovascular: Negative for chest pain and palpitations.   Gastrointestinal: Negative for abdominal pain, diarrhea, nausea and vomiting.   Genitourinary: Negative for dysuria and frequency.   Musculoskeletal: Positive for arthralgias.   Skin: Negative for rash.   Neurological: Negative for weakness and headaches.   Psychiatric/Behavioral: Positive for suicidal ideas.       Objective:      Physical Exam   Constitutional: He is oriented to person, place, and time. He appears well-developed. No distress.   HENT:   Head: Normocephalic  and atraumatic.   Eyes: Conjunctivae are normal. Pupils are equal, round, and reactive to light.   Neck: Normal range of motion. Neck supple. No thyromegaly present.   Cardiovascular: Normal rate, regular rhythm, normal heart sounds and intact distal pulses.   Pulmonary/Chest: Effort normal and breath sounds normal. No respiratory distress. He has no wheezes.   Abdominal: Soft. Bowel sounds are normal. There is no tenderness.   NORMAL ABDOMINAL EXAM   Musculoskeletal: Normal range of motion. He exhibits no edema.   Left shoulder with limited passive ROM - pain with active past 30 degrees.    No gross deformity.    Pain at AC joint   Lymphadenopathy:     He has no cervical adenopathy.   Neurological: He is alert and oriented to person, place, and time.   Skin: Skin is warm and dry. No rash noted.   Psychiatric: He has a normal mood and affect. His behavior is normal.   Nursing note and vitals reviewed.       Assessment/Plan:       Problem List Items Addressed This Visit     None      Visit Diagnoses     Acute pain of left shoulder    -  Primary    Relevant Orders    X-Ray Shoulder 2 or More Views Left (Completed)      Discussed ibuprofen, rest, ice, expectant course for shoulder strain.    Exercises reviewed in clinic.    Seizure disorder - not tolerating lamictal. Contacted patient's neurologist. Advised avoidance of otc meds that would lower seizure threshold. Stop lamictal. Consideration of gabapentin with daily headaces.    RTC if condition acutely worsens or any other concerns, otherwise RTC as scheduled

## 2018-09-13 ENCOUNTER — TELEPHONE (OUTPATIENT)
Dept: FAMILY MEDICINE | Facility: CLINIC | Age: 18
End: 2018-09-13

## 2018-09-13 NOTE — TELEPHONE ENCOUNTER
----- Message from Navdeep Ferrara MD sent at 9/13/2018 10:35 AM CDT -----  Regarding: RE: Medication side effects  Thanks,    All AEDs like antidepressants come with class warning but I have never seen this problem with lamictal which we often also use as mood stabilizer.  Did you make any changes to his regimen so far?    Thanks,  NB      ----- Message -----  From: Laly Unger MD  Sent: 9/5/2018   9:29 AM  To: Navdeep Ferrara MD  Subject: Medication side effects                          Good morning,  I am here this morning with Reed and his mom, Ms. Perez. They are concerned because of side effects of lamictal and zonegran. He is having suicidal ideations and nausea since titration of lamictal and wean of zonegran. Mom says she has attempted to contact you. I do not mind adjusting meds, but obviously, I will not overstep.  Looking forward to hearing from you.  Thanks!  Laly Unger

## 2018-11-06 ENCOUNTER — OFFICE VISIT (OUTPATIENT)
Dept: FAMILY MEDICINE | Facility: CLINIC | Age: 18
End: 2018-11-06
Payer: MEDICAID

## 2018-11-06 VITALS
HEART RATE: 90 BPM | BODY MASS INDEX: 19.49 KG/M2 | DIASTOLIC BLOOD PRESSURE: 60 MMHG | HEIGHT: 63 IN | RESPIRATION RATE: 18 BRPM | SYSTOLIC BLOOD PRESSURE: 100 MMHG | WEIGHT: 110 LBS

## 2018-11-06 DIAGNOSIS — G40.909 SEIZURE DISORDER: Primary | ICD-10-CM

## 2018-11-06 PROCEDURE — 99999 PR PBB SHADOW E&M-EST. PATIENT-LVL III: CPT | Mod: PBBFAC,,, | Performed by: FAMILY MEDICINE

## 2018-11-06 PROCEDURE — 99213 OFFICE O/P EST LOW 20 MIN: CPT | Mod: S$PBB,,, | Performed by: FAMILY MEDICINE

## 2018-11-06 PROCEDURE — 99213 OFFICE O/P EST LOW 20 MIN: CPT | Mod: PBBFAC | Performed by: FAMILY MEDICINE

## 2018-11-06 NOTE — PROGRESS NOTES
Subjective:       Patient ID: Reed Middleton is a 18 y.o. male.    Chief Complaint: discuss medication    Pt is a 18 y.o. male who presents for evaluation and management of   Encounter Diagnosis   Name Primary?    Seizure disorder Yes   .Last seizure was a month ago, but due to forgetting to take medication for 2 days.   On Zonegran. Didn't tolerate lamotrigine   He would like to see a different neurologist. He is unhappy with his current MD.     Doing well on current meds.   Review of Systems   Eyes: Positive for visual disturbance.   Neurological: Positive for seizures and headaches. Negative for syncope, speech difficulty, light-headedness and numbness.       Objective:      Physical Exam   Constitutional: He is oriented to person, place, and time. He appears well-developed and well-nourished.   HENT:   Head: Normocephalic and atraumatic.   Right Ear: External ear normal.   Left Ear: External ear normal.   Nose: Nose normal.   Mouth/Throat: Oropharynx is clear and moist.   Eyes: Conjunctivae and EOM are normal. Pupils are equal, round, and reactive to light. Right eye exhibits no discharge. Left eye exhibits no discharge. No scleral icterus.   Neck: Normal range of motion. Neck supple. No JVD present. No tracheal deviation present. No thyromegaly present.   Cardiovascular: Normal rate, regular rhythm, normal heart sounds and intact distal pulses.   No murmur heard.  Pulmonary/Chest: Effort normal and breath sounds normal. No respiratory distress. He has no wheezes. He has no rales. He exhibits no tenderness.   Abdominal: Soft. Bowel sounds are normal. He exhibits no distension and no mass. There is no tenderness. There is no rebound and no guarding.   Musculoskeletal: Normal range of motion.   Lymphadenopathy:     He has no cervical adenopathy.   Neurological: He is alert and oriented to person, place, and time. He has normal reflexes. He displays normal reflexes. No cranial nerve deficit. He exhibits  normal muscle tone. Coordination normal.   Skin: Skin is warm and dry.   Psychiatric: He has a normal mood and affect. His behavior is normal. Judgment and thought content normal.       Assessment:       1. Seizure disorder        Plan:   Reed Larsen was seen today for discuss medication.    Diagnoses and all orders for this visit:    Seizure disorder  -     Ambulatory referral to Neurology      Problem List Items Addressed This Visit     None      Visit Diagnoses     Seizure disorder    -  Primary    Relevant Orders    Ambulatory referral to Neurology        No Follow-up on file.

## 2018-11-09 ENCOUNTER — TELEPHONE (OUTPATIENT)
Dept: FAMILY MEDICINE | Facility: CLINIC | Age: 18
End: 2018-11-09

## 2018-11-09 ENCOUNTER — HOSPITAL ENCOUNTER (EMERGENCY)
Facility: HOSPITAL | Age: 18
Discharge: HOME | End: 2018-11-09
Attending: SURGERY
Payer: MEDICAID

## 2018-11-09 VITALS
SYSTOLIC BLOOD PRESSURE: 120 MMHG | RESPIRATION RATE: 18 BRPM | HEART RATE: 65 BPM | OXYGEN SATURATION: 100 % | BODY MASS INDEX: 19.31 KG/M2 | WEIGHT: 109 LBS | DIASTOLIC BLOOD PRESSURE: 72 MMHG | TEMPERATURE: 96 F

## 2018-11-09 DIAGNOSIS — G40.909 SEIZURE DISORDER: Primary | ICD-10-CM

## 2018-11-09 LAB
ALBUMIN SERPL BCP-MCNC: 4.5 G/DL
ALP SERPL-CCNC: 100 U/L
ALT SERPL W/O P-5'-P-CCNC: 30 U/L
ANION GAP SERPL CALC-SCNC: 10 MMOL/L
AST SERPL-CCNC: 23 U/L
BASOPHILS # BLD AUTO: 0.01 K/UL
BASOPHILS NFR BLD: 0.2 %
BILIRUB SERPL-MCNC: 0.9 MG/DL
BUN SERPL-MCNC: 10 MG/DL
CALCIUM SERPL-MCNC: 9.8 MG/DL
CHLORIDE SERPL-SCNC: 109 MMOL/L
CO2 SERPL-SCNC: 25 MMOL/L
CREAT SERPL-MCNC: 1.4 MG/DL
DIFFERENTIAL METHOD: NORMAL
EOSINOPHIL # BLD AUTO: 0.1 K/UL
EOSINOPHIL NFR BLD: 0.9 %
ERYTHROCYTE [DISTWIDTH] IN BLOOD BY AUTOMATED COUNT: 13 %
EST. GFR  (AFRICAN AMERICAN): >60 ML/MIN/1.73 M^2
EST. GFR  (NON AFRICAN AMERICAN): >60 ML/MIN/1.73 M^2
GLUCOSE SERPL-MCNC: 76 MG/DL
HCT VFR BLD AUTO: 43.8 %
HGB BLD-MCNC: 14.9 G/DL
LYMPHOCYTES # BLD AUTO: 1.8 K/UL
LYMPHOCYTES NFR BLD: 30.4 %
MCH RBC QN AUTO: 30.7 PG
MCHC RBC AUTO-ENTMCNC: 34 G/DL
MCV RBC AUTO: 90 FL
MONOCYTES # BLD AUTO: 0.4 K/UL
MONOCYTES NFR BLD: 7.1 %
NEUTROPHILS # BLD AUTO: 3.5 K/UL
NEUTROPHILS NFR BLD: 61.4 %
PLATELET # BLD AUTO: 254 K/UL
PMV BLD AUTO: 11.5 FL
POTASSIUM SERPL-SCNC: 4.2 MMOL/L
PROT SERPL-MCNC: 7.5 G/DL
RBC # BLD AUTO: 4.86 M/UL
SODIUM SERPL-SCNC: 144 MMOL/L
WBC # BLD AUTO: 5.76 K/UL

## 2018-11-09 PROCEDURE — 85025 COMPLETE CBC W/AUTO DIFF WBC: CPT

## 2018-11-09 PROCEDURE — 80203 DRUG SCREEN QUANT ZONISAMIDE: CPT

## 2018-11-09 PROCEDURE — 99284 EMERGENCY DEPT VISIT MOD MDM: CPT

## 2018-11-09 PROCEDURE — 80053 COMPREHEN METABOLIC PANEL: CPT

## 2018-11-09 RX ORDER — GUAIFENESIN, PSEUDOEPHEDRINE HYDROCHLORIDE 600; 60 MG/1; MG/1
1 TABLET, EXTENDED RELEASE ORAL 2 TIMES DAILY PRN
Qty: 20 TABLET | Refills: 0 | Status: SHIPPED | OUTPATIENT
Start: 2018-11-09 | End: 2018-11-19

## 2018-11-09 RX ORDER — AMOXICILLIN AND CLAVULANATE POTASSIUM 875; 125 MG/1; MG/1
1 TABLET, FILM COATED ORAL 2 TIMES DAILY
Qty: 20 TABLET | Refills: 0 | Status: SHIPPED | OUTPATIENT
Start: 2018-11-09 | End: 2018-11-19

## 2018-11-09 NOTE — TELEPHONE ENCOUNTER
FYI:    Spoke to patients father, states patient has had 4 mini seizures. Also with complaints of vision disturbance. Advised patients father to bring to ER, patients father verbalized understanding.

## 2018-11-09 NOTE — ED TRIAGE NOTES
18 y.o. male presents to ER   Chief Complaint   Patient presents with    Loss of Vision   Pt reports intermittent loss of vision that has been more frequent over the past few days, Pt has a history of seizures and loss vision with seizures. Denies any known seizure activity, pt is only experiencing the loss of vision. Pt had 4 episodes of vision loss only today. No acute distress noted.

## 2018-11-09 NOTE — ED PROVIDER NOTES
"Encounter Date: 11/9/2018       History     Chief Complaint   Patient presents with    Loss of Vision     Patient presents with chronic intermittent loss of vision for several months. Reports that it is usually associated with seizure, but lately the seizures have been "under control" and he occasionally losses his vision without seizure activity.  Reports to the ER because he "lost his vision 4 times in the last hour," which is more than normal.   Currently has full vision. Denies seizure today. Denies fever. Denies headache. Denues head injury or trauma to his face.       The history is provided by the patient and a parent.     Review of patient's allergies indicates:  No Known Allergies  Past Medical History:   Diagnosis Date    ADHD (attention deficit hyperactivity disorder)     Epilepsy      Past Surgical History:   Procedure Laterality Date    ADENOIDECTOMY      TONSILLECTOMY       Family History   Problem Relation Age of Onset    Hypertension Father     Heart disease Father      Social History     Tobacco Use    Smoking status: Current Some Day Smoker     Years: 2.00     Types: Cigarettes     Start date: 6/12/2016   Substance Use Topics    Alcohol use: Yes     Comment: socially    Drug use: Yes     Frequency: 3.0 times per week     Types: Marijuana     Comment: patient states a couple times a week     Review of Systems   Constitutional: Negative for fever.   HENT: Negative for sore throat.    Eyes: Positive for visual disturbance. Negative for pain.   Respiratory: Negative for shortness of breath.    Cardiovascular: Negative for chest pain.   Gastrointestinal: Negative for nausea.   Genitourinary: Negative for dysuria.   Musculoskeletal: Negative for back pain.   Skin: Negative for rash.   Neurological: Negative for weakness.   Hematological: Does not bruise/bleed easily.       Physical Exam     Initial Vitals [11/09/18 1511]   BP Pulse Resp Temp SpO2   123/61 (!) 59 20 96.7 °F (35.9 °C) 100 %    "   MAP       --         Physical Exam    Nursing note and vitals reviewed.  Constitutional: He appears well-developed and well-nourished.   HENT:   Head: Normocephalic and atraumatic.   Eyes: EOM are normal. Pupils are equal, round, and reactive to light.   Neck: Normal range of motion. Neck supple.   Cardiovascular: Normal rate.   Pulmonary/Chest: Breath sounds normal.   Abdominal: Soft.   Musculoskeletal: Normal range of motion.   Neurological: He is alert and oriented to person, place, and time.   Skin: Skin is warm and dry.   Psychiatric: He has a normal mood and affect. Thought content normal.         ED Course   Procedures  Labs Reviewed - No data to display       Imaging Results    None            CT scan of the head shows chronic sinusitis, worse than his last MRI.  I have discussed follow-up with Neurology and avoidance of synthetic marijuana and any other contaminants that may be contributing to his seizure threshold.                    Clinical Impression:   The encounter diagnosis was Seizure disorder.      Disposition:   Disposition: Discharged  Condition: Stable                        Juan Antonio Pearce Jr., MD  11/09/18 1744       Juan Antonio Pearce Jr., MD  11/27/18 0701

## 2018-11-09 NOTE — ED NOTES
Discharged to home/self care.    - Condition at discharge: Good  - Mode of Discharge: Ambulatory  - The patient left the ED accompanied by his parents  - The discharge instructions were discussed with the patient & his parents  - They both stated an understanding of the discharge instructions.  - Walked pt to the discharge station.

## 2018-11-13 LAB — ZONISAMIDE SERPL-MCNC: 15 MCG/ML (ref 10–40)

## 2018-12-13 ENCOUNTER — HOSPITAL ENCOUNTER (EMERGENCY)
Facility: HOSPITAL | Age: 18
Discharge: HOME | End: 2018-12-14
Attending: SURGERY
Payer: MEDICAID

## 2018-12-13 DIAGNOSIS — R56.9 SEIZURE: ICD-10-CM

## 2018-12-13 LAB
ALBUMIN SERPL BCP-MCNC: 4.6 G/DL
ALP SERPL-CCNC: 83 U/L
ALT SERPL W/O P-5'-P-CCNC: 28 U/L
ANION GAP SERPL CALC-SCNC: 19 MMOL/L
AST SERPL-CCNC: 20 U/L
BASOPHILS # BLD AUTO: 0.01 K/UL
BASOPHILS NFR BLD: 0.1 %
BILIRUB SERPL-MCNC: 1.3 MG/DL
BNP SERPL-MCNC: <10 PG/ML
BUN SERPL-MCNC: 15 MG/DL
CALCIUM SERPL-MCNC: 9.1 MG/DL
CHLORIDE SERPL-SCNC: 108 MMOL/L
CK MB SERPL-MCNC: 1.1 NG/ML
CK MB SERPL-RTO: 1 %
CK SERPL-CCNC: 113 U/L
CK SERPL-CCNC: 113 U/L
CO2 SERPL-SCNC: 14 MMOL/L
CREAT SERPL-MCNC: 1.1 MG/DL
DIFFERENTIAL METHOD: ABNORMAL
EOSINOPHIL # BLD AUTO: 0.1 K/UL
EOSINOPHIL NFR BLD: 1.2 %
ERYTHROCYTE [DISTWIDTH] IN BLOOD BY AUTOMATED COUNT: 12.8 %
EST. GFR  (AFRICAN AMERICAN): >60 ML/MIN/1.73 M^2
EST. GFR  (NON AFRICAN AMERICAN): >60 ML/MIN/1.73 M^2
GLUCOSE SERPL-MCNC: 137 MG/DL
HCT VFR BLD AUTO: 40.5 %
HGB BLD-MCNC: 13.8 G/DL
LYMPHOCYTES # BLD AUTO: 4.3 K/UL
LYMPHOCYTES NFR BLD: 64.4 %
MAGNESIUM SERPL-MCNC: 2.8 MG/DL
MCH RBC QN AUTO: 30.8 PG
MCHC RBC AUTO-ENTMCNC: 34.1 G/DL
MCV RBC AUTO: 90 FL
MONOCYTES # BLD AUTO: 0.3 K/UL
MONOCYTES NFR BLD: 5.1 %
NEUTROPHILS # BLD AUTO: 2 K/UL
NEUTROPHILS NFR BLD: 29.2 %
PHOSPHATE SERPL-MCNC: 4.4 MG/DL
PLATELET # BLD AUTO: 225 K/UL
PMV BLD AUTO: 12.1 FL
POTASSIUM SERPL-SCNC: 3.2 MMOL/L
PROT SERPL-MCNC: 7.1 G/DL
RBC # BLD AUTO: 4.48 M/UL
SODIUM SERPL-SCNC: 141 MMOL/L
TROPONIN I SERPL DL<=0.01 NG/ML-MCNC: <0.006 NG/ML
TSH SERPL DL<=0.005 MIU/L-ACNC: 2.6 UIU/ML
WBC # BLD AUTO: 6.68 K/UL

## 2018-12-13 PROCEDURE — 93005 ELECTROCARDIOGRAM TRACING: CPT

## 2018-12-13 PROCEDURE — 84100 ASSAY OF PHOSPHORUS: CPT

## 2018-12-13 PROCEDURE — 80053 COMPREHEN METABOLIC PANEL: CPT

## 2018-12-13 PROCEDURE — 99285 EMERGENCY DEPT VISIT HI MDM: CPT | Mod: 25

## 2018-12-13 PROCEDURE — 96361 HYDRATE IV INFUSION ADD-ON: CPT

## 2018-12-13 PROCEDURE — 82553 CREATINE MB FRACTION: CPT

## 2018-12-13 PROCEDURE — 93010 ELECTROCARDIOGRAM REPORT: CPT | Mod: ,,, | Performed by: INTERNAL MEDICINE

## 2018-12-13 PROCEDURE — 25000003 PHARM REV CODE 250: Performed by: SURGERY

## 2018-12-13 PROCEDURE — 83735 ASSAY OF MAGNESIUM: CPT

## 2018-12-13 PROCEDURE — 84484 ASSAY OF TROPONIN QUANT: CPT

## 2018-12-13 PROCEDURE — 96360 HYDRATION IV INFUSION INIT: CPT

## 2018-12-13 PROCEDURE — 84443 ASSAY THYROID STIM HORMONE: CPT

## 2018-12-13 PROCEDURE — 85025 COMPLETE CBC W/AUTO DIFF WBC: CPT

## 2018-12-13 PROCEDURE — 82550 ASSAY OF CK (CPK): CPT

## 2018-12-13 PROCEDURE — 36415 COLL VENOUS BLD VENIPUNCTURE: CPT

## 2018-12-13 PROCEDURE — 83880 ASSAY OF NATRIURETIC PEPTIDE: CPT

## 2018-12-13 RX ORDER — SODIUM CHLORIDE 9 MG/ML
1000 INJECTION, SOLUTION INTRAVENOUS
Status: COMPLETED | OUTPATIENT
Start: 2018-12-13 | End: 2018-12-13

## 2018-12-13 RX ADMIN — SODIUM CHLORIDE 1000 ML: 0.9 INJECTION, SOLUTION INTRAVENOUS at 10:12

## 2018-12-13 RX ADMIN — SODIUM CHLORIDE 1000 ML: 0.9 INJECTION, SOLUTION INTRAVENOUS at 09:12

## 2018-12-14 VITALS
WEIGHT: 109.38 LBS | SYSTOLIC BLOOD PRESSURE: 111 MMHG | BODY MASS INDEX: 19.37 KG/M2 | RESPIRATION RATE: 20 BRPM | DIASTOLIC BLOOD PRESSURE: 64 MMHG | OXYGEN SATURATION: 100 % | TEMPERATURE: 98 F | HEART RATE: 79 BPM

## 2018-12-14 NOTE — ED TRIAGE NOTES
18 y.o. male presents to ER ED 01/ED 01B   Chief Complaint   Patient presents with    Seizures   Pt brought to ER by EMS after seizure at home, pt given Versed in route. Pt has history of seizures. No acute distress noted.

## 2018-12-14 NOTE — ED NOTES
Patient resting w mother at bedside. Updated on plan of care/they voiced understanding. Call bell in reach/patient voiced that he would call PRN. Bed locked and in lowest position, side rail up X1. Patient in no acute distress/no further seizure activity noted/neuro intact. Will continue to monitor.

## 2018-12-14 NOTE — ED PROVIDER NOTES
[facility]                                                 Chief Complaint  18 y.o. male with Seizures    History of Present Illness  Reed Middleton presents to the emergency room with seizure tonight  Patient has a history of generalized tonic-clonic seizure, on zongergan daily  Patient had a tonic-clonic seizure prior to arrival, postictal per EMS now  Patient was given Versed by EMS because he was combative postictal  Patient is now alert and appropriate with no obvious seizure activity now    The history is provided by the patient   device was not used during this ER visit  Medical history: ADHD and epilepsy  Surgeries: Adenoidectomy and tonsillectomy  No Known Allergies     Review of Systems and Physical Exam      Review of Systems  -- Constitution - no fever, denies fatigue, no weakness, no chills  -- Eyes - no tearing or redness, no visual disturbance  -- Ear, Nose - no tinnitus or earache, no nasal congestion or discharge  -- Mouth,Throat - no sore throat, no toothache, normal voice, normal swallowing  -- Respiratory - denies cough and congestion, no shortness of breath, no THOMSON  -- Cardiovascular - denies chest pain, no palpitations, denies claudication  -- Gastrointestinal - denies abdominal pain, nausea, vomiting, or diarrhea  -- Genitourinary - no dysuria, denies flank pain, no hematuria, no STD risk  -- Musculoskeletal - denies back pain, negative for myalgias and arthralgias   -- Neurological - generalized tonic-clonic seizure tonight  -- Skin - denies pallor, rash, or changes in skin. no hives or welts noted    Vital Signs  His oral temperature is 98 °F (36.7 °C).   His blood pressure is 105/63 and his pulse is 59   His respiration is 17 and oxygen saturation is 100%.     Physical Exam  -- Nursing note and vitals reviewed  -- Constitutional: Appears well-developed and well-nourished  -- Head: Atraumatic. Normocephalic. No obvious abnormality  -- Eyes:  Pupils are equal and reactive to light. Normal conjunctiva and lids  -- Cardiac: Normal rate, regular rhythm and normal heart sounds  -- Pulmonary: Normal respiratory effort, breath sounds clear to auscultation  -- Abdominal: Soft, no tenderness. Normal bowel sounds. Normal liver edge  -- Musculoskeletal: Normal range of motion, no effusions. Joints stable   -- Neurological: No focal deficits. Showed good interaction with staff  -- Vascular: Posterior tibial, dorsalis pedis and radial pulses 2+ bilaterally    -- Lymphatics: No cervical or peripheral lymphadenopathy. No edema noted  -- Skin: Warm and dry. No evidence of rash or cellulitis    Emergency Room Course      Lab Results     K 3.2 (L)      CO2 14 (L)   BUN 15   CREATININE 1.1    (H)   ALKPHOS 83   AST 20   ALT 28   BILITOT 1.3 (H)   ALBUMIN 4.6   PROT 7.1   WBC 6.68   HGB 13.8 (L)   HCT 40.5            CPKMB 1.1   TROPONINI <0.006   BNP <10   MG 2.8 (H)   TSH 2.600     EKG  -- The EKG findings today were without concerning findings from baseline    Radiology  -- The CT of the head performed in the ER today was negative for acute pathology     Medications Given  0.9%  NaCl infusion (0 mLs Intravenous Stopped 12/13/18 2235)   0.9%  NaCl infusion (1,000 mLs Intravenous New Bag 12/13/18 2241)     Diagnosis  -- The encounter diagnosis was Seizure.    Disposition and Plan  -- Disposition: home  -- Condition: stable  -- Follow-up: Patient to follow up with Laly Unger MD in 1-2 days.  -- I advised the patient that we have found no life threatening condition today  -- At this time, I believe the patient is clinically stable for discharge.   -- The patient acknowledges that close follow up with a MD is required   -- Patient agrees to comply with all instruction and direction given in the ER    This note is dictated on M*Modal word recognition program.  There are word recognition mistakes that are occasionally missed on  review.         Husam Garcia MD  12/13/18 7498

## 2018-12-14 NOTE — ED NOTES
Discharge instructions given, patient and mother voiced understanding.  Discharged to home in stable condition, ambulatory out of ER w steady gait, neuro intact, NAD.

## 2018-12-14 NOTE — ED NOTES
Patient resting w mother at bedside. Updated on plan of care/they voiced understanding. Call bell in reach/patient voiced that he would call PRN. Bed locked and in lowest position, side rail up X1. Patient in no acute distress/no further seizure activity noted since arrival to ER. Will continue to monitor.

## 2019-03-26 ENCOUNTER — HOSPITAL ENCOUNTER (EMERGENCY)
Facility: HOSPITAL | Age: 19
Discharge: HOME | End: 2019-03-26
Attending: FAMILY MEDICINE
Payer: MEDICAID

## 2019-03-26 VITALS
BODY MASS INDEX: 18.07 KG/M2 | HEART RATE: 45 BPM | TEMPERATURE: 98 F | WEIGHT: 102 LBS | OXYGEN SATURATION: 100 % | DIASTOLIC BLOOD PRESSURE: 42 MMHG | SYSTOLIC BLOOD PRESSURE: 93 MMHG | RESPIRATION RATE: 18 BRPM

## 2019-03-26 DIAGNOSIS — R56.9 SEIZURE: Primary | ICD-10-CM

## 2019-03-26 DIAGNOSIS — G44.89: ICD-10-CM

## 2019-03-26 LAB
ALBUMIN SERPL BCP-MCNC: 4.7 G/DL (ref 3.5–5.2)
ALP SERPL-CCNC: 87 U/L (ref 55–135)
ALT SERPL W/O P-5'-P-CCNC: 29 U/L (ref 10–44)
ANION GAP SERPL CALC-SCNC: 15 MMOL/L (ref 8–16)
AST SERPL-CCNC: 22 U/L (ref 10–40)
BASOPHILS # BLD AUTO: 0.03 K/UL (ref 0–0.2)
BASOPHILS NFR BLD: 0.7 % (ref 0–1.9)
BILIRUB SERPL-MCNC: 1.5 MG/DL (ref 0.1–1)
BUN SERPL-MCNC: 10 MG/DL (ref 6–20)
CALCIUM SERPL-MCNC: 10.1 MG/DL (ref 8.7–10.5)
CHLORIDE SERPL-SCNC: 105 MMOL/L (ref 95–110)
CO2 SERPL-SCNC: 19 MMOL/L (ref 23–29)
CREAT SERPL-MCNC: 1 MG/DL (ref 0.5–1.4)
DIFFERENTIAL METHOD: ABNORMAL
EOSINOPHIL # BLD AUTO: 0 K/UL (ref 0–0.5)
EOSINOPHIL NFR BLD: 0.7 % (ref 0–8)
ERYTHROCYTE [DISTWIDTH] IN BLOOD BY AUTOMATED COUNT: 12.6 % (ref 11.5–14.5)
EST. GFR  (AFRICAN AMERICAN): >60 ML/MIN/1.73 M^2
EST. GFR  (NON AFRICAN AMERICAN): >60 ML/MIN/1.73 M^2
GLUCOSE SERPL-MCNC: 106 MG/DL (ref 70–110)
HCT VFR BLD AUTO: 40.1 % (ref 40–54)
HGB BLD-MCNC: 13.6 G/DL (ref 14–18)
LYMPHOCYTES # BLD AUTO: 1.8 K/UL (ref 1–4.8)
LYMPHOCYTES NFR BLD: 38.8 % (ref 18–48)
MCH RBC QN AUTO: 31.1 PG (ref 27–31)
MCHC RBC AUTO-ENTMCNC: 33.9 G/DL (ref 32–36)
MCV RBC AUTO: 92 FL (ref 82–98)
MONOCYTES # BLD AUTO: 0.3 K/UL (ref 0.3–1)
MONOCYTES NFR BLD: 7.5 % (ref 4–15)
NEUTROPHILS # BLD AUTO: 2.4 K/UL (ref 1.8–7.7)
NEUTROPHILS NFR BLD: 52.3 % (ref 38–73)
PLATELET # BLD AUTO: 174 K/UL (ref 150–350)
PMV BLD AUTO: 12.2 FL (ref 9.2–12.9)
POCT GLUCOSE: 115 MG/DL (ref 70–110)
POTASSIUM SERPL-SCNC: 3.6 MMOL/L (ref 3.5–5.1)
PROT SERPL-MCNC: 7.1 G/DL (ref 6–8.4)
RBC # BLD AUTO: 4.38 M/UL (ref 4.6–6.2)
SODIUM SERPL-SCNC: 139 MMOL/L (ref 136–145)
WBC # BLD AUTO: 4.51 K/UL (ref 3.9–12.7)

## 2019-03-26 PROCEDURE — 82962 GLUCOSE BLOOD TEST: CPT

## 2019-03-26 PROCEDURE — 80053 COMPREHEN METABOLIC PANEL: CPT

## 2019-03-26 PROCEDURE — 80177 DRUG SCRN QUAN LEVETIRACETAM: CPT

## 2019-03-26 PROCEDURE — 36415 COLL VENOUS BLD VENIPUNCTURE: CPT

## 2019-03-26 PROCEDURE — 85025 COMPLETE CBC W/AUTO DIFF WBC: CPT

## 2019-03-26 PROCEDURE — 25000003 PHARM REV CODE 250: Performed by: FAMILY MEDICINE

## 2019-03-26 PROCEDURE — 99283 EMERGENCY DEPT VISIT LOW MDM: CPT

## 2019-03-26 RX ORDER — PROCHLORPERAZINE EDISYLATE 5 MG/ML
10 INJECTION INTRAMUSCULAR; INTRAVENOUS
Status: DISCONTINUED | OUTPATIENT
Start: 2019-03-26 | End: 2019-03-26 | Stop reason: HOSPADM

## 2019-03-26 RX ORDER — MEPERIDINE HYDROCHLORIDE 50 MG/ML
50 INJECTION INTRAMUSCULAR; INTRAVENOUS; SUBCUTANEOUS
Status: DISCONTINUED | OUTPATIENT
Start: 2019-03-26 | End: 2019-03-26 | Stop reason: HOSPADM

## 2019-03-26 RX ORDER — ONDANSETRON 8 MG/1
8 TABLET, ORALLY DISINTEGRATING ORAL
Status: COMPLETED | OUTPATIENT
Start: 2019-03-26 | End: 2019-03-26

## 2019-03-26 RX ADMIN — ONDANSETRON 8 MG: 8 TABLET, ORALLY DISINTEGRATING ORAL at 06:03

## 2019-03-26 NOTE — ED PROVIDER NOTES
Encounter Date: 3/26/2019       History     Chief Complaint   Patient presents with    Seizures     witnessed seizure at work, gcs 15      The patient is a 19-year-old male who presents to ER with complaints of seizures.  Pt currently AAOx3, c/o severe headache in frontal area of head. States usually has HA post seizure in occipital area. Also, significant nausea currently. Patient has a history of epilepsy experienced a seizure work today be blood work call EMS.  Previous seizure was December 12, 2018.  He is currently under the care of neurology services over at [facility] sees Barb Hernándezmerced.      Pt was accompanied by family, who provided significant history.  Pt currently maintained on Keppra as sole antiepileptic. Seizures first onset as young teen.  Then had a period of about 5 years where he was seizure free and off any medication.  Seizures resumed with in past year. Reports recent transition to keppra from other mediation.         Review of patient's allergies indicates:  No Known Allergies  Past Medical History:   Diagnosis Date    ADHD (attention deficit hyperactivity disorder)     Epilepsy      Past Surgical History:   Procedure Laterality Date    ADENOIDECTOMY      TONSILLECTOMY       Family History   Problem Relation Age of Onset    Hypertension Father     Heart disease Father      Social History     Tobacco Use    Smoking status: Current Every Day Smoker     Packs/day: 1.00     Years: 2.00     Pack years: 2.00     Types: Cigarettes     Start date: 6/12/2016   Substance Use Topics    Alcohol use: Yes     Comment: socially    Drug use: Yes     Frequency: 3.0 times per week     Types: Marijuana     Comment: patient states a couple times a week     Review of Systems   Constitutional: Negative for fever.   HENT: Negative for sore throat.    Respiratory: Negative for shortness of breath.    Cardiovascular: Negative for chest pain.   Gastrointestinal: Positive for nausea and  vomiting.   Genitourinary: Negative for dysuria.   Musculoskeletal: Negative for back pain.   Skin: Negative for rash.   Neurological: Positive for seizures and headaches. Negative for weakness.   Hematological: Does not bruise/bleed easily.   All other systems reviewed and are negative.      Physical Exam     Initial Vitals [03/26/19 1552]   BP Pulse Resp Temp SpO2   (!) 85/42 60 18 97.5 °F (36.4 °C) 98 %      MAP       --         Physical Exam    Nursing note and vitals reviewed.  Constitutional: He appears well-developed and well-nourished. He is active and cooperative. He appears ill.   HENT:   Head: Normocephalic and atraumatic.   Nose: Nose normal.   Eyes: Conjunctivae are normal. Pupils are equal, round, and reactive to light.   Neck: Normal range of motion. Neck supple.   Cardiovascular: Normal rate and regular rhythm.   Pulmonary/Chest: Breath sounds normal. No respiratory distress. He has no wheezes.   Abdominal: Soft. Bowel sounds are normal.   Musculoskeletal: Normal range of motion.   Neurological: He is alert and oriented to person, place, and time. He has normal strength. GCS score is 15. GCS eye subscore is 4. GCS verbal subscore is 5. GCS motor subscore is 6.   Skin: Skin is warm and dry. Capillary refill takes less than 2 seconds.   Psychiatric: He has a normal mood and affect.         ED Course   Procedures  Labs Reviewed   POCT GLUCOSE - Abnormal; Notable for the following components:       Result Value    POCT Glucose 115 (*)     All other components within normal limits          Imaging Results    None          Medical Decision Making:   ED Management:  1800:  The patient is currently feeling improved and ready to go home.  We will discharge him home continue current medications follow-up with primary care provider to recheck Keppra level                      Clinical Impression:       ICD-10-CM ICD-9-CM   1. Seizure R56.9 780.39   2. Postictal headache G44.89 339.89         Disposition:    Disposition: Discharged  Condition: Stable  For the seizures the patient will be maintained on his current doses of medications and follow up with Neurology for further evaluation they will be able to obtain the Keppra level that was drawn today.  For postictal headache the patient has improved significantly on discharge home stable condition.                        Cj Mackay MD  03/26/19 2988

## 2019-03-26 NOTE — ED NOTES
Received verbal report from GARY Art.  Pt in ED room #1B, lying in stretcher, HOB 30 degrees. Stretcher is in low, locked position, side rails up x2. Call bell within reach of pt, pt instructed on use, pt verbalizes understanding of call bell use.  The patient is awake, alert and cooperative with a calm affect, patient is aware of environment. Airway is open and patent, respirations are spontaneous, normal respiratory effort and rate noted, skin warm and dry, full ROM in all extremities, appearance: NAD noted, resting comfortably.  Plan of care: family to bedside, observe and reassure, position of comfort, patient offers no complaints at this time, awaiting additional orders, will continue to monitor

## 2019-03-26 NOTE — DISCHARGE INSTRUCTIONS
Continue all seizure medicines as prescribed follow-up with neurologist and they will be able to recheck the Keppra level was drawn today.  Return for any new or worsening conditions

## 2019-03-26 NOTE — ED TRIAGE NOTES
19 y.o. male presents to ER ED 01/ED 01B   Chief Complaint   Patient presents with    Seizures     witnessed seizure at work, gcs 15    . No acute distress noted.

## 2019-03-27 ENCOUNTER — PATIENT MESSAGE (OUTPATIENT)
Dept: FAMILY MEDICINE | Facility: CLINIC | Age: 19
End: 2019-03-27

## 2019-03-27 ENCOUNTER — HOSPITAL ENCOUNTER (EMERGENCY)
Facility: HOSPITAL | Age: 19
Discharge: HOME | End: 2019-03-27
Attending: SURGERY
Payer: MEDICAID

## 2019-03-27 VITALS
WEIGHT: 102 LBS | TEMPERATURE: 99 F | DIASTOLIC BLOOD PRESSURE: 70 MMHG | SYSTOLIC BLOOD PRESSURE: 129 MMHG | RESPIRATION RATE: 20 BRPM | BODY MASS INDEX: 18.07 KG/M2 | HEART RATE: 75 BPM | OXYGEN SATURATION: 100 %

## 2019-03-27 DIAGNOSIS — S91.319A LACERATION OF FOOT: ICD-10-CM

## 2019-03-27 LAB
ALBUMIN SERPL BCP-MCNC: 5 G/DL (ref 3.5–5.2)
ALP SERPL-CCNC: 89 U/L (ref 55–135)
ALT SERPL W/O P-5'-P-CCNC: 32 U/L (ref 10–44)
AMORPH CRY URNS QL MICRO: ABNORMAL
AMPHET+METHAMPHET UR QL: NEGATIVE
ANION GAP SERPL CALC-SCNC: 12 MMOL/L (ref 8–16)
AST SERPL-CCNC: 45 U/L (ref 10–40)
BACTERIA #/AREA URNS HPF: ABNORMAL /HPF
BARBITURATES UR QL SCN>200 NG/ML: NEGATIVE
BASOPHILS # BLD AUTO: 0.01 K/UL (ref 0–0.2)
BASOPHILS NFR BLD: 0.1 % (ref 0–1.9)
BENZODIAZ UR QL SCN>200 NG/ML: NEGATIVE
BILIRUB SERPL-MCNC: 2.1 MG/DL (ref 0.1–1)
BILIRUB UR QL STRIP: ABNORMAL
BUN SERPL-MCNC: 12 MG/DL (ref 6–20)
BZE UR QL SCN: NEGATIVE
CALCIUM SERPL-MCNC: 10.4 MG/DL (ref 8.7–10.5)
CANNABINOIDS UR QL SCN: NORMAL
CHLORIDE SERPL-SCNC: 104 MMOL/L (ref 95–110)
CK SERPL-CCNC: 1821 U/L (ref 20–200)
CLARITY UR: CLEAR
CO2 SERPL-SCNC: 23 MMOL/L (ref 23–29)
COLOR UR: YELLOW
CREAT SERPL-MCNC: 0.8 MG/DL (ref 0.5–1.4)
CREAT UR-MCNC: 354.2 MG/DL (ref 23–375)
DIFFERENTIAL METHOD: ABNORMAL
EOSINOPHIL # BLD AUTO: 0 K/UL (ref 0–0.5)
EOSINOPHIL NFR BLD: 0 % (ref 0–8)
ERYTHROCYTE [DISTWIDTH] IN BLOOD BY AUTOMATED COUNT: 12.7 % (ref 11.5–14.5)
EST. GFR  (AFRICAN AMERICAN): >60 ML/MIN/1.73 M^2
EST. GFR  (NON AFRICAN AMERICAN): >60 ML/MIN/1.73 M^2
GLUCOSE SERPL-MCNC: 97 MG/DL (ref 70–110)
GLUCOSE UR QL STRIP: NEGATIVE
HCT VFR BLD AUTO: 41.5 % (ref 40–54)
HGB BLD-MCNC: 14.2 G/DL (ref 14–18)
HGB UR QL STRIP: NEGATIVE
HYALINE CASTS #/AREA URNS LPF: 0 /LPF
KETONES UR QL STRIP: ABNORMAL
LEUKOCYTE ESTERASE UR QL STRIP: NEGATIVE
LYMPHOCYTES # BLD AUTO: 1.3 K/UL (ref 1–4.8)
LYMPHOCYTES NFR BLD: 17.8 % (ref 18–48)
MCH RBC QN AUTO: 31 PG (ref 27–31)
MCHC RBC AUTO-ENTMCNC: 34.2 G/DL (ref 32–36)
MCV RBC AUTO: 91 FL (ref 82–98)
METHADONE UR QL SCN>300 NG/ML: NEGATIVE
MICROSCOPIC COMMENT: ABNORMAL
MONOCYTES # BLD AUTO: 0.6 K/UL (ref 0.3–1)
MONOCYTES NFR BLD: 8.6 % (ref 4–15)
NEUTROPHILS # BLD AUTO: 5.4 K/UL (ref 1.8–7.7)
NEUTROPHILS NFR BLD: 73.5 % (ref 38–73)
NITRITE UR QL STRIP: NEGATIVE
OPIATES UR QL SCN: NEGATIVE
PCP UR QL SCN>25 NG/ML: NEGATIVE
PH UR STRIP: 6 [PH] (ref 5–8)
PLATELET # BLD AUTO: 221 K/UL (ref 150–350)
PMV BLD AUTO: 11.8 FL (ref 9.2–12.9)
POTASSIUM SERPL-SCNC: 3.8 MMOL/L (ref 3.5–5.1)
PROT SERPL-MCNC: 7.6 G/DL (ref 6–8.4)
PROT UR QL STRIP: ABNORMAL
RBC # BLD AUTO: 4.58 M/UL (ref 4.6–6.2)
RBC #/AREA URNS HPF: 2 /HPF (ref 0–4)
SODIUM SERPL-SCNC: 139 MMOL/L (ref 136–145)
SP GR UR STRIP: 1.02 (ref 1–1.03)
TOXICOLOGY INFORMATION: NORMAL
URN SPEC COLLECT METH UR: ABNORMAL
UROBILINOGEN UR STRIP-ACNC: 1 EU/DL
WBC # BLD AUTO: 7.41 K/UL (ref 3.9–12.7)
WBC #/AREA URNS HPF: 5 /HPF (ref 0–5)

## 2019-03-27 PROCEDURE — 36415 COLL VENOUS BLD VENIPUNCTURE: CPT

## 2019-03-27 PROCEDURE — 81000 URINALYSIS NONAUTO W/SCOPE: CPT | Mod: 59

## 2019-03-27 PROCEDURE — 82550 ASSAY OF CK (CPK): CPT

## 2019-03-27 PROCEDURE — 25000003 PHARM REV CODE 250: Performed by: SURGERY

## 2019-03-27 PROCEDURE — 99284 EMERGENCY DEPT VISIT MOD MDM: CPT

## 2019-03-27 PROCEDURE — 80053 COMPREHEN METABOLIC PANEL: CPT

## 2019-03-27 PROCEDURE — 85025 COMPLETE CBC W/AUTO DIFF WBC: CPT

## 2019-03-27 PROCEDURE — 25000003 PHARM REV CODE 250: Performed by: NURSE PRACTITIONER

## 2019-03-27 PROCEDURE — 80307 DRUG TEST PRSMV CHEM ANLYZR: CPT

## 2019-03-27 RX ORDER — MUPIROCIN 20 MG/G
OINTMENT TOPICAL ONCE
Status: COMPLETED | OUTPATIENT
Start: 2019-03-27 | End: 2019-03-27

## 2019-03-27 RX ORDER — CEPHALEXIN 500 MG/1
500 CAPSULE ORAL EVERY 6 HOURS
Qty: 28 CAPSULE | Refills: 0 | Status: SHIPPED | OUTPATIENT
Start: 2019-03-27 | End: 2019-04-03

## 2019-03-27 RX ORDER — MUPIROCIN 20 MG/G
1 OINTMENT TOPICAL
Status: COMPLETED | OUTPATIENT
Start: 2019-03-27 | End: 2019-03-27

## 2019-03-27 RX ORDER — MUPIROCIN 20 MG/G
OINTMENT TOPICAL 3 TIMES DAILY
Qty: 15 G | Refills: 0 | Status: SHIPPED | OUTPATIENT
Start: 2019-03-27 | End: 2019-04-06

## 2019-03-27 RX ORDER — SULFAMETHOXAZOLE AND TRIMETHOPRIM 800; 160 MG/1; MG/1
1 TABLET ORAL
Status: COMPLETED | OUTPATIENT
Start: 2019-03-27 | End: 2019-03-27

## 2019-03-27 RX ADMIN — SULFAMETHOXAZOLE AND TRIMETHOPRIM 1 TABLET: 800; 160 TABLET ORAL at 09:03

## 2019-03-27 RX ADMIN — MUPIROCIN: 20 OINTMENT TOPICAL at 09:03

## 2019-03-27 RX ADMIN — MUPIROCIN 22 G: 20 OINTMENT TOPICAL at 10:03

## 2019-03-28 NOTE — ED PROVIDER NOTES
Encounter Date: 3/27/2019       History     Chief Complaint   Patient presents with    Laceration     Patient presents with a laceration to the sole of his left foot.  4 cm laceration.  Dirt is noted in the wound without any obvious signs of infection at this time.  Good tissue granulation is noted. No active bleeding.  Reports that his last tetanus up-to-date.  He has history of seizures.  He was seen in the emergency room yesterday for his seizure.  He reports that after he was discharged she had an additional seizure.  He reports that he fell on a broken toilet, which cause multiple scratches, abrasions, contusions.  He remains with full range of motion and denies pain at this time.  He reports that he takes Keppra as directed.  His dose was recently increased.  A Keppra level from yesterday is still pending.    The history is provided by the patient.     Review of patient's allergies indicates:  No Known Allergies  Past Medical History:   Diagnosis Date    ADHD (attention deficit hyperactivity disorder)     Epilepsy      Past Surgical History:   Procedure Laterality Date    ADENOIDECTOMY      TONSILLECTOMY       Family History   Problem Relation Age of Onset    Hypertension Father     Heart disease Father      Social History     Tobacco Use    Smoking status: Current Every Day Smoker     Packs/day: 1.00     Years: 2.00     Pack years: 2.00     Types: Cigarettes     Start date: 6/12/2016   Substance Use Topics    Alcohol use: Yes     Comment: socially    Drug use: Yes     Frequency: 3.0 times per week     Types: Marijuana     Comment: patient states a couple times a week     Review of Systems   Constitutional: Negative for fever.   HENT: Negative for congestion, ear pain, rhinorrhea, sore throat and trouble swallowing.    Eyes: Negative for pain, discharge, redness and visual disturbance.   Respiratory: Negative for cough, shortness of breath and wheezing.    Cardiovascular: Negative for chest pain and  leg swelling.   Gastrointestinal: Positive for vomiting. Negative for abdominal pain, constipation, diarrhea and nausea.   Genitourinary: Negative for difficulty urinating, dysuria, flank pain, frequency and urgency.   Musculoskeletal: Negative for arthralgias, back pain, myalgias and neck pain.   Skin: Positive for wound (Multiple scratches, contusions, abrasions as well as laceration to the left foot). Negative for rash.   Neurological: Positive for seizures. Negative for weakness and headaches.   Psychiatric/Behavioral: Negative.        Physical Exam     Initial Vitals [03/27/19 2046]   BP Pulse Resp Temp SpO2   (!) 109/50 65 18 98.7 °F (37.1 °C) 100 %      MAP       --         Physical Exam    Nursing note and vitals reviewed.  Constitutional: No distress.   HENT:   Head: Normocephalic and atraumatic.   Right Ear: External ear normal.   Left Ear: External ear normal.   Nose: Nose normal.   Mouth/Throat: Oropharynx is clear and moist.   Eyes: Conjunctivae, EOM and lids are normal. Pupils are equal, round, and reactive to light.   Neck: Neck supple.   Cardiovascular: Normal rate, regular rhythm, normal heart sounds and intact distal pulses.   Pulmonary/Chest: Breath sounds normal. No respiratory distress.   Abdominal: Soft. Bowel sounds are normal. There is no tenderness.   Musculoskeletal: Normal range of motion.   Neurological: He is alert and oriented to person, place, and time. He has normal strength.   Skin: Skin is warm and dry. Capillary refill takes less than 2 seconds. Abrasion, bruising and laceration (Left foot) noted. No rash noted.   Multiple scratches, abrasions, contusions noted to bilateral upper and lower extremities and mid back.   Psychiatric: He has a normal mood and affect. His behavior is normal.         ED Course   Procedures  Labs Reviewed   CBC W/ AUTO DIFFERENTIAL - Abnormal; Notable for the following components:       Result Value    RBC 4.58 (*)     Gran% 73.5 (*)     Lymph% 17.8 (*)      All other components within normal limits   COMPREHENSIVE METABOLIC PANEL - Abnormal; Notable for the following components:    Total Bilirubin 2.1 (*)     AST 45 (*)     All other components within normal limits   CK - Abnormal; Notable for the following components:    CPK 1821 (*)     All other components within normal limits   URINALYSIS, REFLEX TO URINE CULTURE   DRUG SCREEN PANEL, URINE EMERGENCY          Imaging Results          X-Ray Foot Complete Left (In process)                      Medications   mupirocin 2 % ointment 22 g (has no administration in time range)   sulfamethoxazole-trimethoprim 800-160mg per tablet 1 tablet (1 tablet Oral Given 3/27/19 2138)   mupirocin 2 % ointment ( Topical (Top) Given 3/27/19 2138)                           Clinical Impression:       ICD-10-CM ICD-9-CM   1. Laceration of foot S91.319A 892.0         Disposition:   Disposition: Discharged  Condition: Stable    I took over this patient from the nurse practitioner tonight  Patient had a seizure yesterday, long history of epilepsy  During this seizure he broke a porcelain toilet, cut his foot  This cut on his left foot is over 24 hours or by inspection  Good granulation and no signs of infection at this time  X-ray of this foot shows no foreign body or signs of fracture  The wound was irrigated and washed in the ER, dressed with Bactroban  Kerlix wrap applied, patient to follow up with primary care physician heart  Patient is to be on Keflex antibiotics, healed wound by secondary intention  Patient counseled on seizure precaution, afebrile with good stable vital signs                      Husam Garcia MD  03/27/19 5386

## 2019-03-28 NOTE — ED TRIAGE NOTES
Pt reports laceration to left foot after seizure yesterday. Multiple lacerations at different size and depth noted to bilateral hands and arms.

## 2019-03-29 ENCOUNTER — PATIENT MESSAGE (OUTPATIENT)
Dept: FAMILY MEDICINE | Facility: CLINIC | Age: 19
End: 2019-03-29

## 2019-03-29 LAB — LEVETIRACETAM SERPL-MCNC: 4.5 UG/ML (ref 3–60)

## 2019-08-23 ENCOUNTER — OFFICE VISIT (OUTPATIENT)
Dept: INTERNAL MEDICINE | Facility: CLINIC | Age: 19
End: 2019-08-23
Payer: MEDICAID

## 2019-08-23 ENCOUNTER — TELEPHONE (OUTPATIENT)
Dept: FAMILY MEDICINE | Facility: CLINIC | Age: 19
End: 2019-08-23

## 2019-08-23 VITALS
HEIGHT: 63 IN | WEIGHT: 113.75 LBS | BODY MASS INDEX: 20.16 KG/M2 | HEART RATE: 80 BPM | DIASTOLIC BLOOD PRESSURE: 72 MMHG | SYSTOLIC BLOOD PRESSURE: 120 MMHG | RESPIRATION RATE: 20 BRPM

## 2019-08-23 DIAGNOSIS — M25.512 ACUTE PAIN OF LEFT SHOULDER: Primary | ICD-10-CM

## 2019-08-23 DIAGNOSIS — M75.82 TENDINITIS OF LEFT ROTATOR CUFF: ICD-10-CM

## 2019-08-23 PROCEDURE — 99213 OFFICE O/P EST LOW 20 MIN: CPT | Mod: PBBFAC | Performed by: INTERNAL MEDICINE

## 2019-08-23 PROCEDURE — 99999 PR PBB SHADOW E&M-EST. PATIENT-LVL III: ICD-10-PCS | Mod: PBBFAC,,, | Performed by: INTERNAL MEDICINE

## 2019-08-23 PROCEDURE — 99213 PR OFFICE/OUTPT VISIT, EST, LEVL III, 20-29 MIN: ICD-10-PCS | Mod: S$PBB,,, | Performed by: INTERNAL MEDICINE

## 2019-08-23 PROCEDURE — 99999 PR PBB SHADOW E&M-EST. PATIENT-LVL III: CPT | Mod: PBBFAC,,, | Performed by: INTERNAL MEDICINE

## 2019-08-23 PROCEDURE — 99213 OFFICE O/P EST LOW 20 MIN: CPT | Mod: S$PBB,,, | Performed by: INTERNAL MEDICINE

## 2019-08-23 RX ORDER — IBUPROFEN 800 MG/1
800 TABLET ORAL 2 TIMES DAILY
Qty: 60 TABLET | Refills: 0 | Status: SHIPPED | OUTPATIENT
Start: 2019-08-23

## 2019-08-23 NOTE — LETTER
August 23, 2019      [facility]  [address] [place] [id number]  Phone: [phone]  Fax: [phone]       Patient: Reed Middleton   YOB: 2000  Date of Visit: 08/23/2019    To Whom It May Concern:    Alexa Middleton  was at [facility] on 08/23/2019. He may return to work/school on 8/26/19 with no restrictions. If you have any questions or concerns, or if I can be of further assistance, please do not hesitate to contact me.    Sincerely,    Shae Silverman MD

## 2019-08-23 NOTE — PATIENT INSTRUCTIONS
Exercises for Shoulder Flexibility: Wall Walk    Improving your flexibility can reduce pain. Stretching exercises also can help increase your range of pain-free motion. Breathe normally when you exercise. Use smooth, fluid movements.  Note: Follow any special instructions you are given. If you feel pain, stop the exercise. If the pain continues after stopping, call your healthcare provider:  · Stand with your shoulder about 2 feet from the wall.  · Raise your arm to shoulder level and gently walk your fingers up the wall as high as you can.  · Hold for a few seconds. Then walk your fingers back down.  · Repeat 3 times. Move closer to the wall as you repeat.  · Build up to holding each stretch for 30 seconds.  Caution: Do this stretch only if your healthcare provider recommends it. Dont do it when you are first injured.       Date Last Reviewed: 8/16/2015  © 7679-6724 [employer]. [address] [place] [address]. All rights reserved. This information is not intended as a substitute for professional medical care. Always follow your healthcare professional's instructions.        Exercises for Shoulder Flexibility: Internal Rotation    This stretch can help restore shoulder flexibility and relieve pain over time. When stretching, be sure to breathe deeply. Follow any special instructions from your healthcare provider or physical therapist.  1. While seated, move the arm on the side you want to stretch toward the middle of your back. The palm of your hand should face out.  2. Cup your other hand under the hand thats behind your back. Gently push your cupped hand upward until you feel the stretch in the shoulder. Try to hold the stretch for 5 seconds.  3. Work up to doing 3 sets of this stretch, 3 times a day. Work up to holding the stretch for 30 to 60 seconds.  Note: Keep your back straight. Its OK if your hand cant reach the middle of your back. Instead, start the stretch with your hand  as close as you can get it to the middle of your back.     Frozen shoulder  Frozen shoulder is another name for adhesive capsulitis. This causes restricted movement in the shoulder. If you have frozen shoulder, this stretch may cause discomfort, especially when you first get started. A few months may pass before you achieve the results you want. But once your shoulder heals, it rarely becomes frozen again. So stick to your stretching program. If you have any questions, be sure to ask your healthcare provider.   Date Last Reviewed: 10/14/2015  © 2018-2408 [employer]. [address] [place] [address]. All rights reserved. This information is not intended as a substitute for professional medical care. Always follow your healthcare professional's instructions.

## 2019-08-23 NOTE — TELEPHONE ENCOUNTER
----- Message from [place] sent at 2019  9:30 AM CDT -----  Contact: Patient  Reed Middleton  MRN: [id number]  : 2000  PCP: Laly Unger  Home Phone      [phone]  Work Phone      Not on file.  [employer]          [phone]      MESSAGE: arm pain - requesting appt today - if no appt available, will need work excuse    Call [phone]    PCP: Cornel

## 2019-08-23 NOTE — PROGRESS NOTES
Subjective:       Patient ID: Reed Middleton is a 19 y.o. male.    Chief Complaint: Arm Pain (starts in shoulder and travels down arm)      HPI:  Patient is new to me but known to [facility] and presents with left arm and shoulder pain. He was lifting tile a few days ago into the back of the truck. Sx started after the heavy lifting. Pain is lateral and goes down the tricep. Pain is worse with lifting above the head (lateral and forward). Pain described as sharp. Tried ibuprofen with some relief. No prior injuries or surgeries.     Past Medical History:   Diagnosis Date    ADHD (attention deficit hyperactivity disorder)     Epilepsy        Family History   Problem Relation Age of Onset    Hypertension Father     Heart disease Father        Social History     Socioeconomic History    Marital status: Single     Spouse name: Not on file    Number of children: Not on file    Years of education: Not on file    Highest education level: Not on file   Occupational History    Not on file   Social Needs    Financial resource strain: Not on file    Food insecurity:     Worry: Not on file     Inability: Not on file    Transportation needs:     Medical: Not on file     Non-medical: Not on file   Tobacco Use    Smoking status: Current Every Day Smoker     Packs/day: 1.00     Years: 2.00     Pack years: 2.00     Types: Cigarettes     Start date: 6/12/2016   Substance and Sexual Activity    Alcohol use: Yes     Comment: socially    Drug use: Yes     Frequency: 3.0 times per week     Types: Marijuana     Comment: patient states a couple times a week    Sexual activity: Never   Lifestyle    Physical activity:     Days per week: Not on file     Minutes per session: Not on file    Stress: Not on file   Relationships    Social connections:     Talks on phone: Not on file     Gets together: Not on file     Attends Congregational service: Not on file     Active member of club or organization: Not on file     Attends meetings  of clubs or organizations: Not on file     Relationship status: Not on file   Other Topics Concern    Not on file   Social History Narrative    Not on file       Review of Systems   Constitutional: Negative for activity change, fatigue, fever and unexpected weight change.   HENT: Negative for congestion, ear pain, hearing loss, rhinorrhea and sore throat.    Eyes: Negative for redness and visual disturbance.   Respiratory: Negative for cough, shortness of breath and wheezing.    Cardiovascular: Negative for chest pain, palpitations and leg swelling.   Gastrointestinal: Negative for abdominal pain, constipation, diarrhea, nausea and vomiting.   Genitourinary: Negative for decreased urine volume, dysuria, frequency and urgency.   Musculoskeletal: Positive for arthralgias (left shoulder). Negative for back pain, joint swelling and neck pain.   Skin: Negative for color change, rash and wound.   Neurological: Negative for dizziness, tremors, weakness, light-headedness and headaches.         Objective:      Physical Exam   Constitutional: He is oriented to person, place, and time. He appears well-developed and well-nourished. No distress.   HENT:   Head: Normocephalic and atraumatic.   Right Ear: External ear normal.   Left Ear: External ear normal.   Eyes: Pupils are equal, round, and reactive to light. Conjunctivae and EOM are normal. Right eye exhibits no discharge. Left eye exhibits no discharge.   Neck: Neck supple. No tracheal deviation present.   Cardiovascular: Normal rate and regular rhythm.   No murmur heard.  Pulmonary/Chest: Effort normal and breath sounds normal. No respiratory distress. He has no wheezes. He has no rales.   Abdominal: Soft. Bowel sounds are normal. He exhibits no distension. There is no tenderness.   Musculoskeletal: He exhibits no deformity.   + Job and Guerin  Has full active ROM but pain with full forward flexion and abduction  5/5 bicep/deltoid/tricep strength b/l   Neurological: He  is alert and oriented to person, place, and time. No cranial nerve deficit.   Skin: Skin is warm and dry.   Psychiatric: He has a normal mood and affect. His behavior is normal.   Vitals reviewed.      Assessment:       1. Acute pain of left shoulder    2. Tendinitis of left rotator cuff        Plan:       Reed Larsen was seen today for arm pain.    Diagnoses and all orders for this visit:    Acute pain of left shoulder  -     ibuprofen (ADVIL,MOTRIN) 800 MG tablet; Take 1 tablet (800 mg total) by mouth 2 (two) times daily.    Tendinitis of left rotator cuff  -     ibuprofen (ADVIL,MOTRIN) 800 MG tablet; Take 1 tablet (800 mg total) by mouth 2 (two) times daily.    did heavy lifting but on exam doubt tear today  Ibuprofen PRN  Rest  Stretching  Heat PRN  Call if no improvement or worsening sx   Consider PT next

## 2019-08-23 NOTE — TELEPHONE ENCOUNTER
Scheduled pt an appt with Dr. Silverman today at 1pm. Called and notified pt. He verbally understood

## 2019-11-04 ENCOUNTER — HOSPITAL ENCOUNTER (EMERGENCY)
Facility: HOSPITAL | Age: 19
Discharge: HOME | End: 2019-11-04
Attending: SURGERY
Payer: MEDICAID

## 2019-11-04 VITALS
OXYGEN SATURATION: 100 % | RESPIRATION RATE: 20 BRPM | HEIGHT: 60 IN | HEART RATE: 76 BPM | DIASTOLIC BLOOD PRESSURE: 61 MMHG | WEIGHT: 113.31 LBS | BODY MASS INDEX: 22.25 KG/M2 | TEMPERATURE: 97 F | SYSTOLIC BLOOD PRESSURE: 129 MMHG

## 2019-11-04 DIAGNOSIS — M25.512 ACUTE PAIN OF LEFT SHOULDER: Primary | ICD-10-CM

## 2019-11-04 PROCEDURE — 99283 EMERGENCY DEPT VISIT LOW MDM: CPT | Mod: 25

## 2019-11-04 PROCEDURE — 25000003 PHARM REV CODE 250: Performed by: NURSE PRACTITIONER

## 2019-11-04 RX ORDER — NAPROXEN 500 MG/1
500 TABLET ORAL EVERY 12 HOURS PRN
Qty: 10 TABLET | Refills: 0 | Status: SHIPPED | OUTPATIENT
Start: 2019-11-04 | End: 2020-01-21

## 2019-11-04 RX ORDER — ACETAMINOPHEN 325 MG/1
650 TABLET ORAL
Status: COMPLETED | OUTPATIENT
Start: 2019-11-04 | End: 2019-11-04

## 2019-11-04 RX ADMIN — ACETAMINOPHEN 650 MG: 325 TABLET ORAL at 11:11

## 2019-11-04 NOTE — ED PROVIDER NOTES
Encounter Date: 11/4/2019       History     Chief Complaint   Patient presents with    Shoulder Pain     left shoulder and arm pain and swelling     The history is provided by the patient.   Arm Injury    Illness onset: 2 months ago. Injury mechanism: lifting heavy object. He came to the ER via walk-in. There is an injury to the left shoulder. Pertinent negatives include no chest pain, no abdominal pain, no headaches, no neck pain, no seizures, no weakness and no cough.   Reports decreased range of motion secondary to pain.  Reports swelling.  Reports tenderness with palpation.  Neurovascularly intact.    Review of patient's allergies indicates:  No Known Allergies  Past Medical History:   Diagnosis Date    ADHD (attention deficit hyperactivity disorder)     Epilepsy      Past Surgical History:   Procedure Laterality Date    ADENOIDECTOMY      TONSILLECTOMY       Family History   Problem Relation Age of Onset    Hypertension Father     Heart disease Father      Social History     Tobacco Use    Smoking status: Current Every Day Smoker     Packs/day: 1.00     Years: 2.00     Pack years: 2.00     Types: Cigarettes     Start date: 6/12/2016   Substance Use Topics    Alcohol use: Yes     Comment: socially    Drug use: Not Currently     Frequency: 3.0 times per week     Types: Marijuana     Review of Systems   Constitutional: Negative for fever.   HENT: Negative for congestion, ear pain, rhinorrhea, sore throat and trouble swallowing.    Eyes: Negative for pain and redness.   Respiratory: Negative for cough and shortness of breath.    Cardiovascular: Negative for chest pain.   Gastrointestinal: Negative for abdominal pain.   Genitourinary: Negative for difficulty urinating and dysuria.   Musculoskeletal: Positive for arthralgias. Negative for back pain, myalgias and neck pain.   Skin: Negative for rash and wound.   Neurological: Negative for seizures, weakness and headaches.   Psychiatric/Behavioral: Negative.         Physical Exam     Initial Vitals [11/04/19 1104]   BP Pulse Resp Temp SpO2   129/61 76 20 97.2 °F (36.2 °C) 100 %      MAP       --         Physical Exam    Nursing note and vitals reviewed.  Constitutional: No distress.   HENT:   Head: Normocephalic and atraumatic.   Right Ear: External ear normal.   Left Ear: External ear normal.   Nose: Nose normal.   Mouth/Throat: Oropharynx is clear and moist.   Eyes: Conjunctivae, EOM and lids are normal. Pupils are equal, round, and reactive to light.   Neck: Neck supple.   Cardiovascular: Normal rate, regular rhythm, normal heart sounds and intact distal pulses.   Pulmonary/Chest: Breath sounds normal. No respiratory distress.   Abdominal: Soft. Bowel sounds are normal. There is no tenderness.   Musculoskeletal:        Left shoulder: He exhibits decreased range of motion (Secondary to pain). He exhibits no tenderness, no swelling, no effusion, no crepitus, no deformity, no laceration, normal pulse and normal strength.   Neurological: He is alert and oriented to person, place, and time. He has normal strength.   Skin: Skin is warm and dry. Capillary refill takes less than 2 seconds. No rash noted.   Psychiatric: He has a normal mood and affect. His behavior is normal.         ED Course   Procedures  Labs Reviewed - No data to display       Imaging Results          X-Ray Shoulder 2 or More Views Left (Final result)  Result time 11/04/19 11:38:09    Final result by Maggy Lee MD (11/04/19 11:38:09)                 Impression:      No fracture or dislocation.      Electronically signed by: Maggy Lee MD  Date:    11/04/2019  Time:    11:38             Narrative:    EXAMINATION:  XR SHOULDER COMPLETE 2 OR MORE VIEWS LEFT    CLINICAL HISTORY:  shoulder pain;    TECHNIQUE:  Two or three views of the left shoulder were performed.    COMPARISON:  None    FINDINGS:  The acromioclavicular and glenohumeral joint spaces are intact.  No fracture or dislocation is seen.                                         Medications   acetaminophen tablet 650 mg (650 mg Oral Given 11/4/19 1124)               Arm sling applied in ER.         Clinical Impression:       ICD-10-CM ICD-9-CM   1. Acute pain of left shoulder M25.512 719.41     New Prescriptions    NAPROXEN (NAPROSYN) 500 MG TABLET    Take 1 tablet (500 mg total) by mouth every 12 (twelve) hours as needed (pain). Take with food.     Educated to also take tylenol PRN pain.     Disposition:   Disposition: Discharged  Condition: Stable    The patient acknowledges that close follow up with medical provider is required. Instructed to follow up with PCP within 2 days. Patient was given specific return precautions. The patient agrees to comply with all instruction and directions given in the ER.                         Yoana Montaño NP  11/04/19 1159

## 2019-11-04 NOTE — ED TRIAGE NOTES
19 y.o. male presents to ER qTrack 01/qTrk 01   Chief Complaint   Patient presents with    Shoulder Pain     left shoulder and arm pain and swelling   . No acute distress noted.  PT states he strained his shoulder 2 months ago picking up a heavy tire

## 2019-11-04 NOTE — DISCHARGE INSTRUCTIONS
**Follow up with PCP in 24-48 hours. Return to ER with worsening of symptoms. Sling for comfort.     **You may also take over-the-counter Tylenol as directed on package insert as needed for pain.    **Our goal in the emergency department is to always give you outstanding care and exceptional service. You may receive a survey by mail or e-mail in the next week regarding your experience in our ED. We would greatly appreciate your completing and returning the survey. Your feedback provides us with a way to recognize our staff who give very good care and it helps us learn how to improve when your experience was below our aspiration of excellence.

## 2020-01-21 ENCOUNTER — HOSPITAL ENCOUNTER (EMERGENCY)
Facility: HOSPITAL | Age: 20
Discharge: HOME | End: 2020-01-21
Attending: SURGERY
Payer: MEDICAID

## 2020-01-21 VITALS
BODY MASS INDEX: 22.3 KG/M2 | DIASTOLIC BLOOD PRESSURE: 86 MMHG | OXYGEN SATURATION: 100 % | TEMPERATURE: 98 F | WEIGHT: 114.19 LBS | HEART RATE: 110 BPM | RESPIRATION RATE: 20 BRPM | SYSTOLIC BLOOD PRESSURE: 143 MMHG

## 2020-01-21 DIAGNOSIS — A08.4 VIRAL GASTROENTERITIS: Primary | ICD-10-CM

## 2020-01-21 LAB
ALBUMIN SERPL BCP-MCNC: 5.2 G/DL (ref 3.5–5.2)
ALP SERPL-CCNC: 97 U/L (ref 55–135)
ALT SERPL W/O P-5'-P-CCNC: 57 U/L (ref 10–44)
ANION GAP SERPL CALC-SCNC: 12 MMOL/L (ref 8–16)
AST SERPL-CCNC: 30 U/L (ref 10–40)
BASOPHILS # BLD AUTO: 0.02 K/UL (ref 0–0.2)
BASOPHILS NFR BLD: 0.2 % (ref 0–1.9)
BILIRUB SERPL-MCNC: 1.8 MG/DL (ref 0.1–1)
BILIRUB UR QL STRIP: NEGATIVE
BUN SERPL-MCNC: 10 MG/DL (ref 6–20)
CALCIUM SERPL-MCNC: 10.7 MG/DL (ref 8.7–10.5)
CHLORIDE SERPL-SCNC: 100 MMOL/L (ref 95–110)
CLARITY UR: CLEAR
CO2 SERPL-SCNC: 27 MMOL/L (ref 23–29)
COLOR UR: YELLOW
CREAT SERPL-MCNC: 0.8 MG/DL (ref 0.5–1.4)
DIFFERENTIAL METHOD: ABNORMAL
EOSINOPHIL # BLD AUTO: 0.1 K/UL (ref 0–0.5)
EOSINOPHIL NFR BLD: 0.7 % (ref 0–8)
ERYTHROCYTE [DISTWIDTH] IN BLOOD BY AUTOMATED COUNT: 11.8 % (ref 11.5–14.5)
EST. GFR  (AFRICAN AMERICAN): >60 ML/MIN/1.73 M^2
EST. GFR  (NON AFRICAN AMERICAN): >60 ML/MIN/1.73 M^2
GLUCOSE SERPL-MCNC: 88 MG/DL (ref 70–110)
GLUCOSE UR QL STRIP: NEGATIVE
HCT VFR BLD AUTO: 47.9 % (ref 40–54)
HGB BLD-MCNC: 16.5 G/DL (ref 14–18)
HGB UR QL STRIP: NEGATIVE
IMM GRANULOCYTES # BLD AUTO: 0.02 K/UL (ref 0–0.04)
IMM GRANULOCYTES NFR BLD AUTO: 0.2 % (ref 0–0.5)
KETONES UR QL STRIP: NEGATIVE
LEUKOCYTE ESTERASE UR QL STRIP: NEGATIVE
LIPASE SERPL-CCNC: 10 U/L (ref 4–60)
LYMPHOCYTES # BLD AUTO: 1.5 K/UL (ref 1–4.8)
LYMPHOCYTES NFR BLD: 18.5 % (ref 18–48)
MCH RBC QN AUTO: 31.9 PG (ref 27–31)
MCHC RBC AUTO-ENTMCNC: 34.4 G/DL (ref 32–36)
MCV RBC AUTO: 93 FL (ref 82–98)
MONOCYTES # BLD AUTO: 0.7 K/UL (ref 0.3–1)
MONOCYTES NFR BLD: 8.4 % (ref 4–15)
NEUTROPHILS # BLD AUTO: 5.8 K/UL (ref 1.8–7.7)
NEUTROPHILS NFR BLD: 72 % (ref 38–73)
NITRITE UR QL STRIP: NEGATIVE
NRBC BLD-RTO: 0 /100 WBC
PH UR STRIP: 7 [PH] (ref 5–8)
PLATELET # BLD AUTO: 248 K/UL (ref 150–350)
PMV BLD AUTO: 11.6 FL (ref 9.2–12.9)
POTASSIUM SERPL-SCNC: 4.1 MMOL/L (ref 3.5–5.1)
PROT SERPL-MCNC: 8.5 G/DL (ref 6–8.4)
PROT UR QL STRIP: NEGATIVE
RBC # BLD AUTO: 5.17 M/UL (ref 4.6–6.2)
SODIUM SERPL-SCNC: 139 MMOL/L (ref 136–145)
SP GR UR STRIP: 1.01 (ref 1–1.03)
URN SPEC COLLECT METH UR: NORMAL
UROBILINOGEN UR STRIP-ACNC: 1 EU/DL
WBC # BLD AUTO: 8.01 K/UL (ref 3.9–12.7)

## 2020-01-21 PROCEDURE — 81003 URINALYSIS AUTO W/O SCOPE: CPT

## 2020-01-21 PROCEDURE — 83690 ASSAY OF LIPASE: CPT

## 2020-01-21 PROCEDURE — 80053 COMPREHEN METABOLIC PANEL: CPT

## 2020-01-21 PROCEDURE — 99284 EMERGENCY DEPT VISIT MOD MDM: CPT | Mod: 25

## 2020-01-21 PROCEDURE — 25000003 PHARM REV CODE 250: Performed by: SURGERY

## 2020-01-21 PROCEDURE — 36415 COLL VENOUS BLD VENIPUNCTURE: CPT

## 2020-01-21 PROCEDURE — 85025 COMPLETE CBC W/AUTO DIFF WBC: CPT

## 2020-01-21 RX ORDER — TRAMADOL HYDROCHLORIDE 50 MG/1
50 TABLET ORAL
Status: COMPLETED | OUTPATIENT
Start: 2020-01-21 | End: 2020-01-21

## 2020-01-21 RX ORDER — NAPROXEN 250 MG/1
250 TABLET ORAL
Status: COMPLETED | OUTPATIENT
Start: 2020-01-21 | End: 2020-01-21

## 2020-01-21 RX ORDER — CYCLOBENZAPRINE HCL 10 MG
10 TABLET ORAL 3 TIMES DAILY PRN
Qty: 15 TABLET | Refills: 0 | Status: SHIPPED | OUTPATIENT
Start: 2020-01-21 | End: 2020-01-26

## 2020-01-21 RX ORDER — ONDANSETRON 4 MG/1
4 TABLET, ORALLY DISINTEGRATING ORAL
Status: COMPLETED | OUTPATIENT
Start: 2020-01-21 | End: 2020-01-21

## 2020-01-21 RX ORDER — NAPROXEN 500 MG/1
500 TABLET ORAL 2 TIMES DAILY WITH MEALS
Qty: 20 TABLET | Refills: 0 | Status: SHIPPED | OUTPATIENT
Start: 2020-01-21 | End: 2020-01-31

## 2020-01-21 RX ADMIN — NAPROXEN 250 MG: 250 TABLET ORAL at 05:01

## 2020-01-21 RX ADMIN — ONDANSETRON 4 MG: 4 TABLET, ORALLY DISINTEGRATING ORAL at 05:01

## 2020-01-21 RX ADMIN — TRAMADOL HYDROCHLORIDE 50 MG: 50 TABLET, FILM COATED ORAL at 05:01

## 2020-01-21 NOTE — ED PROVIDER NOTES
Encounter Date: 1/21/2020       History     Chief Complaint   Patient presents with    Abdominal Pain    Diarrhea    Vomiting     Patient is a 19-year-old white male with 2 day history of nausea, vomiting, left upper quadrant abdominal pain.  He also has some left pleuritic chest pain.  He has some diarrhea.  He denies dysuria or hematuria.  No history kidney stone.  Does report feeling the pain in the left flank area also.        Review of patient's allergies indicates:  No Known Allergies  Past Medical History:   Diagnosis Date    ADHD (attention deficit hyperactivity disorder)     Epilepsy      Past Surgical History:   Procedure Laterality Date    ADENOIDECTOMY      TONSILLECTOMY       Family History   Problem Relation Age of Onset    Hypertension Father     Heart disease Father      Social History     Tobacco Use    Smoking status: Current Every Day Smoker     Packs/day: 1.00     Years: 2.00     Pack years: 2.00     Types: Cigarettes     Start date: 6/12/2016   Substance Use Topics    Alcohol use: Yes     Comment: socially    Drug use: Not Currently     Frequency: 3.0 times per week     Types: Marijuana     Review of Systems   Constitutional: Negative for fever.   HENT: Negative for sore throat.    Respiratory: Negative for shortness of breath.    Cardiovascular: Positive for chest pain.   Gastrointestinal: Positive for diarrhea and nausea.   Genitourinary: Positive for flank pain. Negative for dysuria.   Musculoskeletal: Negative for back pain.   Skin: Negative for rash.   Neurological: Negative for weakness.   Hematological: Does not bruise/bleed easily.       Physical Exam     Initial Vitals [01/21/20 1617]   BP Pulse Resp Temp SpO2   (!) 143/86 110 20 98 °F (36.7 °C) 100 %      MAP       --         Physical Exam    Nursing note and vitals reviewed.  Constitutional: He appears well-developed and well-nourished.   HENT:   Head: Normocephalic.   Rigid cervical collar in place   Eyes: EOM are normal.  Pupils are equal, round, and reactive to light.   Neck: Normal range of motion. Neck supple.   Cardiovascular: Normal rate.   Pulmonary/Chest: Effort normal and breath sounds normal. No stridor. He has no rhonchi. He has no rales.   Abdominal: Soft. There is tenderness in the left upper quadrant. There is no rigidity, no rebound and no CVA tenderness.   Musculoskeletal: Normal range of motion.   Neurological: He is alert and oriented to person, place, and time.   Skin: Skin is warm and dry.   Psychiatric: He has a normal mood and affect. Thought content normal.         ED Course   Procedures  Labs Reviewed - No data to display       Imaging Results    None                                          Clinical Impression:       ICD-10-CM ICD-9-CM   1. Viral gastroenteritis A08.4 008.8         Disposition:   Disposition: Discharged  Condition: Stable                     Juan Antonio Pearce Jr., MD  01/21/20 0820

## 2021-04-05 ENCOUNTER — PATIENT MESSAGE (OUTPATIENT)
Dept: ADMINISTRATIVE | Facility: HOSPITAL | Age: 21
End: 2021-04-05

## 2021-04-21 ENCOUNTER — CLINICAL SUPPORT (OUTPATIENT)
Dept: FAMILY MEDICINE | Facility: CLINIC | Age: 21
End: 2021-04-21
Payer: MEDICAID

## 2021-04-21 ENCOUNTER — TELEPHONE (OUTPATIENT)
Dept: INTERNAL MEDICINE | Facility: CLINIC | Age: 21
End: 2021-04-21

## 2021-04-21 ENCOUNTER — OFFICE VISIT (OUTPATIENT)
Dept: FAMILY MEDICINE | Facility: CLINIC | Age: 21
End: 2021-04-21
Payer: MEDICAID

## 2021-04-21 VITALS
SYSTOLIC BLOOD PRESSURE: 110 MMHG | HEIGHT: 60 IN | WEIGHT: 131.63 LBS | BODY MASS INDEX: 25.84 KG/M2 | DIASTOLIC BLOOD PRESSURE: 70 MMHG | HEART RATE: 68 BPM | TEMPERATURE: 99 F | RESPIRATION RATE: 12 BRPM

## 2021-04-21 DIAGNOSIS — G40.909 SEIZURE DISORDER: ICD-10-CM

## 2021-04-21 DIAGNOSIS — Z51.81 MEDICATION MONITORING ENCOUNTER: ICD-10-CM

## 2021-04-21 DIAGNOSIS — Z51.81 MEDICATION MONITORING ENCOUNTER: Primary | ICD-10-CM

## 2021-04-21 LAB
ALBUMIN SERPL BCP-MCNC: 4.7 G/DL (ref 3.5–5.2)
ALP SERPL-CCNC: 81 U/L (ref 55–135)
ALT SERPL W/O P-5'-P-CCNC: 36 U/L (ref 10–44)
ANION GAP SERPL CALC-SCNC: 9 MMOL/L (ref 8–16)
AST SERPL-CCNC: 27 U/L (ref 10–40)
BILIRUB SERPL-MCNC: 1.1 MG/DL (ref 0.1–1)
BUN SERPL-MCNC: 13 MG/DL (ref 6–20)
CALCIUM SERPL-MCNC: 9.6 MG/DL (ref 8.7–10.5)
CHLORIDE SERPL-SCNC: 104 MMOL/L (ref 95–110)
CO2 SERPL-SCNC: 26 MMOL/L (ref 23–29)
CREAT SERPL-MCNC: 0.8 MG/DL (ref 0.5–1.4)
EST. GFR  (AFRICAN AMERICAN): >60 ML/MIN/1.73 M^2
EST. GFR  (NON AFRICAN AMERICAN): >60 ML/MIN/1.73 M^2
GLUCOSE SERPL-MCNC: 94 MG/DL (ref 70–110)
POTASSIUM SERPL-SCNC: 4.5 MMOL/L (ref 3.5–5.1)
PROT SERPL-MCNC: 7.5 G/DL (ref 6–8.4)
SODIUM SERPL-SCNC: 139 MMOL/L (ref 136–145)

## 2021-04-21 PROCEDURE — 36415 COLL VENOUS BLD VENIPUNCTURE: CPT | Mod: PBBFAC

## 2021-04-21 PROCEDURE — 80177 DRUG SCRN QUAN LEVETIRACETAM: CPT | Performed by: FAMILY MEDICINE

## 2021-04-21 PROCEDURE — 99999 PR PBB SHADOW E&M-EST. PATIENT-LVL III: CPT | Mod: PBBFAC,,, | Performed by: FAMILY MEDICINE

## 2021-04-21 PROCEDURE — 99213 OFFICE O/P EST LOW 20 MIN: CPT | Mod: PBBFAC | Performed by: FAMILY MEDICINE

## 2021-04-21 PROCEDURE — 99214 PR OFFICE/OUTPT VISIT, EST, LEVL IV, 30-39 MIN: ICD-10-PCS | Mod: S$PBB,,, | Performed by: FAMILY MEDICINE

## 2021-04-21 PROCEDURE — 99999 PR PBB SHADOW E&M-EST. PATIENT-LVL III: ICD-10-PCS | Mod: PBBFAC,,, | Performed by: FAMILY MEDICINE

## 2021-04-21 PROCEDURE — 80053 COMPREHEN METABOLIC PANEL: CPT | Performed by: FAMILY MEDICINE

## 2021-04-21 PROCEDURE — 99214 OFFICE O/P EST MOD 30 MIN: CPT | Mod: S$PBB,,, | Performed by: FAMILY MEDICINE

## 2021-04-21 RX ORDER — LEVETIRACETAM 750 MG/1
3000 TABLET, EXTENDED RELEASE ORAL DAILY
Qty: 120 TABLET | Refills: 6 | Status: SHIPPED | OUTPATIENT
Start: 2021-04-21

## 2021-04-21 RX ORDER — LEVETIRACETAM 750 MG/1
3000 TABLET, EXTENDED RELEASE ORAL DAILY
Qty: 120 TABLET | Refills: 6 | Status: SHIPPED | OUTPATIENT
Start: 2021-04-21 | End: 2021-04-21

## 2021-04-26 LAB — LEVETIRACETAM SERPL-MCNC: <1 UG/ML (ref 3–60)

## 2021-05-04 ENCOUNTER — PATIENT MESSAGE (OUTPATIENT)
Dept: RESEARCH | Facility: HOSPITAL | Age: 21
End: 2021-05-04

## 2021-06-01 ENCOUNTER — OFFICE VISIT (OUTPATIENT)
Dept: FAMILY MEDICINE | Facility: CLINIC | Age: 21
End: 2021-06-01
Payer: MEDICAID

## 2021-06-01 ENCOUNTER — PATIENT MESSAGE (OUTPATIENT)
Dept: ORTHOPEDICS | Facility: CLINIC | Age: 21
End: 2021-06-01

## 2021-06-01 VITALS
RESPIRATION RATE: 16 BRPM | HEART RATE: 72 BPM | BODY MASS INDEX: 24.68 KG/M2 | TEMPERATURE: 98 F | DIASTOLIC BLOOD PRESSURE: 84 MMHG | HEIGHT: 60 IN | SYSTOLIC BLOOD PRESSURE: 116 MMHG | WEIGHT: 125.69 LBS

## 2021-06-01 DIAGNOSIS — K59.09 CHRONIC CONSTIPATION: ICD-10-CM

## 2021-06-01 DIAGNOSIS — G40.909 SEIZURE DISORDER: ICD-10-CM

## 2021-06-01 DIAGNOSIS — R11.2 NON-INTRACTABLE VOMITING WITH NAUSEA, UNSPECIFIED VOMITING TYPE: Primary | ICD-10-CM

## 2021-06-01 PROCEDURE — 99999 PR PBB SHADOW E&M-EST. PATIENT-LVL III: CPT | Mod: PBBFAC,,, | Performed by: FAMILY MEDICINE

## 2021-06-01 PROCEDURE — 99213 OFFICE O/P EST LOW 20 MIN: CPT | Mod: S$PBB,,, | Performed by: FAMILY MEDICINE

## 2021-06-01 PROCEDURE — 99213 OFFICE O/P EST LOW 20 MIN: CPT | Mod: PBBFAC | Performed by: FAMILY MEDICINE

## 2021-06-01 PROCEDURE — 99999 PR PBB SHADOW E&M-EST. PATIENT-LVL III: ICD-10-PCS | Mod: PBBFAC,,, | Performed by: FAMILY MEDICINE

## 2021-06-01 PROCEDURE — 99213 PR OFFICE/OUTPT VISIT, EST, LEVL III, 20-29 MIN: ICD-10-PCS | Mod: S$PBB,,, | Performed by: FAMILY MEDICINE

## 2021-06-01 RX ORDER — ONDANSETRON HYDROCHLORIDE 8 MG/1
8 TABLET, FILM COATED ORAL EVERY 8 HOURS PRN
Qty: 10 TABLET | Refills: 2 | Status: SHIPPED | OUTPATIENT
Start: 2021-06-01

## 2021-06-01 RX ORDER — POLYETHYLENE GLYCOL 3350 17 G/17G
17 POWDER, FOR SOLUTION ORAL 2 TIMES DAILY
Qty: 60 PACKET | Refills: 11 | Status: SHIPPED | OUTPATIENT
Start: 2021-06-01

## 2021-07-18 ENCOUNTER — HOSPITAL ENCOUNTER (EMERGENCY)
Facility: HOSPITAL | Age: 21
Discharge: HOME | End: 2021-07-18
Attending: SURGERY
Payer: MEDICAID

## 2021-07-18 VITALS
OXYGEN SATURATION: 100 % | WEIGHT: 125.81 LBS | SYSTOLIC BLOOD PRESSURE: 120 MMHG | HEART RATE: 66 BPM | BODY MASS INDEX: 24.57 KG/M2 | RESPIRATION RATE: 20 BRPM | DIASTOLIC BLOOD PRESSURE: 60 MMHG | TEMPERATURE: 99 F

## 2021-07-18 DIAGNOSIS — F32.A DEPRESSION, UNSPECIFIED DEPRESSION TYPE: Primary | ICD-10-CM

## 2021-07-18 LAB
25(OH)D3+25(OH)D2 SERPL-MCNC: 32 NG/ML (ref 30–96)
ALBUMIN SERPL BCP-MCNC: 4.4 G/DL (ref 3.5–5.2)
ALP SERPL-CCNC: 72 U/L (ref 55–135)
ALT SERPL W/O P-5'-P-CCNC: 24 U/L (ref 10–44)
AMPHET+METHAMPHET UR QL: NEGATIVE
ANION GAP SERPL CALC-SCNC: 10 MMOL/L (ref 8–16)
APAP SERPL-MCNC: <3 UG/ML (ref 10–20)
AST SERPL-CCNC: 18 U/L (ref 10–40)
BARBITURATES UR QL SCN>200 NG/ML: NEGATIVE
BASOPHILS # BLD AUTO: 0.02 K/UL (ref 0–0.2)
BASOPHILS NFR BLD: 0.4 % (ref 0–1.9)
BENZODIAZ UR QL SCN>200 NG/ML: NEGATIVE
BILIRUB SERPL-MCNC: 0.7 MG/DL (ref 0.1–1)
BILIRUB UR QL STRIP: NEGATIVE
BUN SERPL-MCNC: 8 MG/DL (ref 6–20)
BZE UR QL SCN: NEGATIVE
CALCIUM SERPL-MCNC: 10 MG/DL (ref 8.7–10.5)
CANNABINOIDS UR QL SCN: ABNORMAL
CHLORIDE SERPL-SCNC: 107 MMOL/L (ref 95–110)
CLARITY UR: CLEAR
CO2 SERPL-SCNC: 22 MMOL/L (ref 23–29)
COLOR UR: YELLOW
CREAT SERPL-MCNC: 0.7 MG/DL (ref 0.5–1.4)
CREAT UR-MCNC: 34.9 MG/DL (ref 23–375)
DIFFERENTIAL METHOD: ABNORMAL
EOSINOPHIL # BLD AUTO: 0 K/UL (ref 0–0.5)
EOSINOPHIL NFR BLD: 0.9 % (ref 0–8)
ERYTHROCYTE [DISTWIDTH] IN BLOOD BY AUTOMATED COUNT: 12 % (ref 11.5–14.5)
EST. GFR  (AFRICAN AMERICAN): >60 ML/MIN/1.73 M^2
EST. GFR  (NON AFRICAN AMERICAN): >60 ML/MIN/1.73 M^2
ETHANOL SERPL-MCNC: <10 MG/DL
FOLATE SERPL-MCNC: 7.8 NG/ML (ref 4–24)
GLUCOSE SERPL-MCNC: 98 MG/DL (ref 70–110)
GLUCOSE UR QL STRIP: NEGATIVE
HCT VFR BLD AUTO: 45.2 % (ref 40–54)
HGB BLD-MCNC: 15.5 G/DL (ref 14–18)
HGB UR QL STRIP: NEGATIVE
IMM GRANULOCYTES # BLD AUTO: 0.01 K/UL (ref 0–0.04)
IMM GRANULOCYTES NFR BLD AUTO: 0.2 % (ref 0–0.5)
KETONES UR QL STRIP: NEGATIVE
LEUKOCYTE ESTERASE UR QL STRIP: NEGATIVE
LYMPHOCYTES # BLD AUTO: 1.9 K/UL (ref 1–4.8)
LYMPHOCYTES NFR BLD: 41.8 % (ref 18–48)
MCH RBC QN AUTO: 31.8 PG (ref 27–31)
MCHC RBC AUTO-ENTMCNC: 34.3 G/DL (ref 32–36)
MCV RBC AUTO: 93 FL (ref 82–98)
METHADONE UR QL SCN>300 NG/ML: NEGATIVE
MONOCYTES # BLD AUTO: 0.4 K/UL (ref 0.3–1)
MONOCYTES NFR BLD: 8 % (ref 4–15)
NEUTROPHILS # BLD AUTO: 2.3 K/UL (ref 1.8–7.7)
NEUTROPHILS NFR BLD: 48.7 % (ref 38–73)
NITRITE UR QL STRIP: NEGATIVE
NRBC BLD-RTO: 0 /100 WBC
OPIATES UR QL SCN: NEGATIVE
PCP UR QL SCN>25 NG/ML: NEGATIVE
PH UR STRIP: 7 [PH] (ref 5–8)
PLATELET # BLD AUTO: 234 K/UL (ref 150–450)
PMV BLD AUTO: 11.9 FL (ref 9.2–12.9)
POTASSIUM SERPL-SCNC: 4.1 MMOL/L (ref 3.5–5.1)
PROT SERPL-MCNC: 7.1 G/DL (ref 6–8.4)
PROT UR QL STRIP: NEGATIVE
RBC # BLD AUTO: 4.87 M/UL (ref 4.6–6.2)
SALICYLATES SERPL-MCNC: <5 MG/DL (ref 15–30)
SARS-COV-2 RDRP RESP QL NAA+PROBE: NEGATIVE
SODIUM SERPL-SCNC: 139 MMOL/L (ref 136–145)
SP GR UR STRIP: 1.01 (ref 1–1.03)
T4 FREE SERPL-MCNC: 0.91 NG/DL (ref 0.71–1.51)
TOXICOLOGY INFORMATION: ABNORMAL
TSH SERPL DL<=0.005 MIU/L-ACNC: 0.98 UIU/ML (ref 0.4–4)
URN SPEC COLLECT METH UR: NORMAL
UROBILINOGEN UR STRIP-ACNC: NEGATIVE EU/DL
VIT B12 SERPL-MCNC: 380 PG/ML (ref 210–950)
WBC # BLD AUTO: 4.64 K/UL (ref 3.9–12.7)

## 2021-07-18 PROCEDURE — 84481 FREE ASSAY (FT-3): CPT | Performed by: SURGERY

## 2021-07-18 PROCEDURE — 82607 VITAMIN B-12: CPT | Performed by: SURGERY

## 2021-07-18 PROCEDURE — 84439 ASSAY OF FREE THYROXINE: CPT | Performed by: SURGERY

## 2021-07-18 PROCEDURE — 85025 COMPLETE CBC W/AUTO DIFF WBC: CPT | Performed by: SURGERY

## 2021-07-18 PROCEDURE — 82077 ASSAY SPEC XCP UR&BREATH IA: CPT | Performed by: SURGERY

## 2021-07-18 PROCEDURE — 99283 EMERGENCY DEPT VISIT LOW MDM: CPT | Mod: 25

## 2021-07-18 PROCEDURE — 82306 VITAMIN D 25 HYDROXY: CPT | Performed by: SURGERY

## 2021-07-18 PROCEDURE — 36415 COLL VENOUS BLD VENIPUNCTURE: CPT | Performed by: SURGERY

## 2021-07-18 PROCEDURE — 80307 DRUG TEST PRSMV CHEM ANLYZR: CPT | Performed by: SURGERY

## 2021-07-18 PROCEDURE — 81003 URINALYSIS AUTO W/O SCOPE: CPT | Performed by: SURGERY

## 2021-07-18 PROCEDURE — 99205 OFFICE O/P NEW HI 60 MIN: CPT | Mod: AF,HB,95, | Performed by: PSYCHIATRY & NEUROLOGY

## 2021-07-18 PROCEDURE — U0002 COVID-19 LAB TEST NON-CDC: HCPCS | Performed by: SURGERY

## 2021-07-18 PROCEDURE — S4991 NICOTINE PATCH NONLEGEND: HCPCS | Performed by: SURGERY

## 2021-07-18 PROCEDURE — 80053 COMPREHEN METABOLIC PANEL: CPT | Performed by: SURGERY

## 2021-07-18 PROCEDURE — 99205 PR OFFICE/OUTPT VISIT, NEW, LEVL V, 60-74 MIN: ICD-10-PCS | Mod: AF,HB,95, | Performed by: PSYCHIATRY & NEUROLOGY

## 2021-07-18 PROCEDURE — 80143 DRUG ASSAY ACETAMINOPHEN: CPT | Performed by: SURGERY

## 2021-07-18 PROCEDURE — 80179 DRUG ASSAY SALICYLATE: CPT | Performed by: SURGERY

## 2021-07-18 PROCEDURE — 84443 ASSAY THYROID STIM HORMONE: CPT | Performed by: SURGERY

## 2021-07-18 PROCEDURE — 25000003 PHARM REV CODE 250: Performed by: SURGERY

## 2021-07-18 PROCEDURE — 82746 ASSAY OF FOLIC ACID SERUM: CPT | Performed by: SURGERY

## 2021-07-18 RX ORDER — IBUPROFEN 200 MG
1 TABLET ORAL
Status: DISCONTINUED | OUTPATIENT
Start: 2021-07-18 | End: 2021-07-18 | Stop reason: HOSPADM

## 2021-07-18 RX ORDER — HALOPERIDOL 5 MG/ML
5 INJECTION INTRAMUSCULAR EVERY 4 HOURS PRN
Status: DISCONTINUED | OUTPATIENT
Start: 2021-07-18 | End: 2021-07-18 | Stop reason: HOSPADM

## 2021-07-18 RX ORDER — LORAZEPAM 2 MG/ML
2 INJECTION INTRAMUSCULAR EVERY 4 HOURS PRN
Status: DISCONTINUED | OUTPATIENT
Start: 2021-07-18 | End: 2021-07-18 | Stop reason: HOSPADM

## 2021-07-18 RX ORDER — DIPHENHYDRAMINE HYDROCHLORIDE 50 MG/ML
50 INJECTION INTRAMUSCULAR; INTRAVENOUS EVERY 4 HOURS PRN
Status: DISCONTINUED | OUTPATIENT
Start: 2021-07-18 | End: 2021-07-18 | Stop reason: HOSPADM

## 2021-07-18 RX ADMIN — NICOTINE 1 PATCH: 21 PATCH, EXTENDED RELEASE TRANSDERMAL at 11:07

## 2021-07-19 ENCOUNTER — PATIENT OUTREACH (OUTPATIENT)
Dept: ADMINISTRATIVE | Facility: OTHER | Age: 21
End: 2021-07-19

## 2021-07-19 LAB — T3FREE SERPL-MCNC: 3.6 PG/ML (ref 2.3–4.2)

## 2021-08-19 ENCOUNTER — PATIENT OUTREACH (OUTPATIENT)
Dept: ADMINISTRATIVE | Facility: OTHER | Age: 21
End: 2021-08-19

## 2021-08-19 ENCOUNTER — TELEPHONE (OUTPATIENT)
Dept: FAMILY MEDICINE | Facility: CLINIC | Age: 21
End: 2021-08-19

## 2024-04-29 ENCOUNTER — HOSPITAL ENCOUNTER (EMERGENCY)
Facility: HOSPITAL | Age: 24
Discharge: HOME | End: 2024-04-29
Attending: SURGERY
Payer: MEDICAID

## 2024-04-29 VITALS
TEMPERATURE: 98 F | DIASTOLIC BLOOD PRESSURE: 72 MMHG | RESPIRATION RATE: 18 BRPM | WEIGHT: 134.38 LBS | BODY MASS INDEX: 23.81 KG/M2 | OXYGEN SATURATION: 97 % | HEART RATE: 57 BPM | SYSTOLIC BLOOD PRESSURE: 133 MMHG | HEIGHT: 63 IN

## 2024-04-29 DIAGNOSIS — M25.531 RIGHT WRIST PAIN: ICD-10-CM

## 2024-04-29 DIAGNOSIS — S63.501A SPRAIN OF RIGHT WRIST, INITIAL ENCOUNTER: Primary | ICD-10-CM

## 2024-04-29 PROCEDURE — 25000003 PHARM REV CODE 250: Performed by: NURSE PRACTITIONER

## 2024-04-29 PROCEDURE — 29125 APPL SHORT ARM SPLINT STATIC: CPT | Mod: RT

## 2024-04-29 PROCEDURE — 99283 EMERGENCY DEPT VISIT LOW MDM: CPT | Mod: 25

## 2024-04-29 RX ORDER — IBUPROFEN 600 MG/1
600 TABLET ORAL EVERY 8 HOURS PRN
Qty: 20 TABLET | Refills: 0 | Status: SHIPPED | OUTPATIENT
Start: 2024-04-29

## 2024-04-29 RX ORDER — IBUPROFEN 600 MG/1
600 TABLET ORAL
Status: COMPLETED | OUTPATIENT
Start: 2024-04-29 | End: 2024-04-29

## 2024-04-29 RX ADMIN — IBUPROFEN 600 MG: 600 TABLET ORAL at 09:04

## 2024-04-29 NOTE — ED PROVIDER NOTES
Encounter Date: 4/29/2024       History     Chief Complaint   Patient presents with    Wrist Pain    Hand Pain     Reed Middleton is a 24 y.o. male with PMH of ADHD and epilepsy presents to the ED for evaluation of wrist and hand pain.  Patient reports that he was trying to push himself in his bed when he felt a popping sensation in his right palm.  He reports that he immediately had pain in his right thenar eminence area and wrist.  Pain is described as throbbing, exacerbated by closing his fist, currently rated 8/10 in severity.  He denies associated swelling.  Denies numbness or tingling to right hand or fingers.    The history is provided by the patient.     Review of patient's allergies indicates:  No Known Allergies  Past Medical History:   Diagnosis Date    ADHD (attention deficit hyperactivity disorder)     Epilepsy      Past Surgical History:   Procedure Laterality Date    ADENOIDECTOMY      TONSILLECTOMY       Family History   Problem Relation Name Age of Onset    Hypertension Father      Heart disease Father       Social History     Tobacco Use    Smoking status: Every Day     Current packs/day: 1.00     Average packs/day: 1 pack/day for 7.9 years (7.9 ttl pk-yrs)     Types: Cigarettes     Start date: 6/12/2016   Substance Use Topics    Alcohol use: Yes     Comment: socially    Drug use: Not Currently     Frequency: 3.0 times per week     Types: Marijuana     Review of Systems   Constitutional:  Negative for appetite change, chills and fever.   HENT:  Negative for congestion, ear discharge, ear pain, postnasal drip, rhinorrhea and sore throat.    Respiratory:  Negative for cough, chest tightness and shortness of breath.    Cardiovascular:  Negative for chest pain.   Gastrointestinal:  Negative for abdominal distention, abdominal pain and nausea.   Genitourinary:  Negative for dysuria, flank pain, hematuria and urgency.   Musculoskeletal:  Positive for arthralgias (Right hand and wrist). Negative  for back pain.   Skin: Negative.  Negative for rash.   Neurological:  Negative for dizziness, weakness, numbness and headaches.   Hematological:  Does not bruise/bleed easily.       Physical Exam     Initial Vitals [04/29/24 0818]   BP Pulse Resp Temp SpO2   126/69 60 20 98 °F (36.7 °C) 98 %      MAP       --         Physical Exam    Nursing note and vitals reviewed.  Constitutional: He appears well-developed and well-nourished.   HENT:   Head: Normocephalic and atraumatic.   Eyes: Conjunctivae and EOM are normal. Pupils are equal, round, and reactive to light.   Neck: Neck supple.   Cardiovascular:  Normal rate, regular rhythm, normal heart sounds and intact distal pulses.           Pulmonary/Chest: Breath sounds normal.   Abdominal: Abdomen is soft. Bowel sounds are normal.   Musculoskeletal:      Right hand: Tenderness present. No swelling. Decreased range of motion. Normal capillary refill. Normal pulse.      Cervical back: Neck supple.     Neurological: He is alert and oriented to person, place, and time. He has normal strength.   Skin: Skin is warm and dry.   Psychiatric: He has a normal mood and affect. His behavior is normal. Judgment and thought content normal.         ED Course   Procedures  Labs Reviewed - No data to display       Imaging Results              X-Ray Hand 3 view Right (Final result)  Result time 04/29/24 09:45:25      Final result by Kosta Roy MD (04/29/24 09:45:25)                   Impression:      As above.      Electronically signed by: Kosta Roy MD  Date:    04/29/2024  Time:    09:45               Narrative:    EXAMINATION:  XR HAND COMPLETE 3 VIEW RIGHT    CLINICAL HISTORY:  right hand pain;    TECHNIQUE:  PA, lateral, and oblique views of the right hand were performed.    FINDINGS:  There is no fracture, dislocation, or bony erosion.                                       X-Ray Wrist Complete Right (Final result)  Result time 04/29/24 09:45:56      Final result by Kosta Roy  MD DOLLY (04/29/24 09:45:56)                   Impression:      As above.      Electronically signed by: Kosta Roy MD  Date:    04/29/2024  Time:    09:45               Narrative:    EXAMINATION:  XR WRIST COMPLETE 3 VIEWS RIGHT    CLINICAL HISTORY:  Pain in right wrist    TECHNIQUE:  PA, lateral, and oblique views of the right wrist were performed.    FINDINGS:  There is no fracture, dislocation, or bony erosion.                                       Medications   ibuprofen tablet 600 mg (600 mg Oral Given 4/29/24 0937)     Medical Decision Making  Evaluation of a 24-year-old male with right wrist and hand pain after injury.  + right thenar eminence pain with palpation with no obvious swelling.  Good distal pulses and capillary refill    Differential diagnosis includes sprain/strain/contusion/fracture    Amount and/or Complexity of Data Reviewed  Radiology: ordered. Decision-making details documented in ED Course.     Details: X-ray of the right wrist and hand shows no acute finding    Risk  Prescription drug management.  Risk Details: Stable for DC home.  Wrist splint up applied for comfort.  NSAIDs for pain control.  Outpatient follow-up with orthopedist, referral placed for follow-up. Patient/caregiver voices understanding and feels comfortable with discharge plan.      The patient acknowledges that close follow up with medical provider is required. Instructed to follow up with PCP within 2 days. Patient was given specific return precautions. The patient agrees to comply with all instruction and directions given in the ER.                                        Clinical Impression:  Final diagnoses:  [M25.531] Right wrist pain  [S63.501A] Sprain of right wrist, initial encounter (Primary)          ED Disposition Condition    Discharge Stable          ED Prescriptions       Medication Sig Dispense Start Date End Date Auth. Provider    ibuprofen (ADVIL,MOTRIN) 600 MG tablet Take 1 tablet (600 mg total) by mouth  every 8 (eight) hours as needed for Pain. 20 tablet 4/29/2024 -- Za Delaney NP          Follow-up Information       Follow up With Specialties Details Why Contact Info    Isis Selby PA-C Orthopedic Surgery Schedule an appointment as soon as possible for a visit in 2 days  [address] [place] [address] Dr. Arpan PETERS [id number]  [phone]               Za Delaney NP  04/29/24 1042

## 2024-04-29 NOTE — ED TRIAGE NOTES
C/o right wrist and hand pain radiating into his elbow after attempting to push himself up in the bed. Patient reports he felt his palm roll when attempting to push himself up.

## 2024-09-02 ENCOUNTER — HOSPITAL ENCOUNTER (EMERGENCY)
Facility: HOSPITAL | Age: 24
Discharge: HOME | End: 2024-09-02
Attending: SURGERY
Payer: MEDICAID

## 2024-09-02 VITALS
DIASTOLIC BLOOD PRESSURE: 64 MMHG | BODY MASS INDEX: 24.2 KG/M2 | RESPIRATION RATE: 20 BRPM | TEMPERATURE: 98 F | WEIGHT: 131.5 LBS | HEART RATE: 89 BPM | OXYGEN SATURATION: 97 % | SYSTOLIC BLOOD PRESSURE: 120 MMHG | HEIGHT: 62 IN

## 2024-09-02 DIAGNOSIS — S62.91XA CLOSED FRACTURE OF RIGHT HAND, INITIAL ENCOUNTER: Primary | ICD-10-CM

## 2024-09-02 DIAGNOSIS — M25.531 RIGHT WRIST PAIN: ICD-10-CM

## 2024-09-02 DIAGNOSIS — M25.551 RIGHT HIP PAIN: ICD-10-CM

## 2024-09-02 DIAGNOSIS — M25.521 ELBOW PAIN, RIGHT: ICD-10-CM

## 2024-09-02 PROCEDURE — 99283 EMERGENCY DEPT VISIT LOW MDM: CPT | Mod: 25

## 2024-09-02 PROCEDURE — 25000003 PHARM REV CODE 250

## 2024-09-02 PROCEDURE — 29125 APPL SHORT ARM SPLINT STATIC: CPT | Mod: RT

## 2024-09-02 RX ORDER — HYDROCODONE BITARTRATE AND ACETAMINOPHEN 5; 325 MG/1; MG/1
1 TABLET ORAL EVERY 8 HOURS PRN
Qty: 12 TABLET | Refills: 0 | Status: SHIPPED | OUTPATIENT
Start: 2024-09-02

## 2024-09-02 RX ORDER — HYDROCODONE BITARTRATE AND ACETAMINOPHEN 5; 325 MG/1; MG/1
1 TABLET ORAL
Status: COMPLETED | OUTPATIENT
Start: 2024-09-02 | End: 2024-09-02

## 2024-09-02 RX ADMIN — HYDROCODONE BITARTRATE AND ACETAMINOPHEN 1 TABLET: 5; 325 TABLET ORAL at 04:09

## 2024-09-02 NOTE — ED TRIAGE NOTES
C/o pain from right elbow to tip of right fingers after falling out of a tree approximately 8 feet this morning. Patient reports hit the right side of his head, and superficial laceration noted to right hip. Denies loc.

## 2024-09-02 NOTE — ED PROVIDER NOTES
Encounter Date: 9/2/2024       History     Chief Complaint   Patient presents with    Arm Injury    Fall     This note is dictated on M*Modal word recognition program.  There are word recognition mistakes and grammatical errors that are occasionally missed on review.     Reed Middleton is a 24 y.o. male presents to ER today with complaints of right hip pain with bruising, right wrist pain, right elbow pain after falling out of a tree approximately 8 ft.  Patient reports he was trying to do some tree limb clean up and was in the tree when a branch snapped and he fell to the ground.  Patient reports this happened earlier this morning however the pain has not gotten any better.  Patient reports worse pain is to his right wrist.  She rates pain 7/10.  Denies LOC denies vomiting.  Patient denies headache at this time.      The history is provided by the patient.     Review of patient's allergies indicates:  No Known Allergies  Past Medical History:   Diagnosis Date    ADHD (attention deficit hyperactivity disorder)     Epilepsy      Past Surgical History:   Procedure Laterality Date    ADENOIDECTOMY      TONSILLECTOMY       Family History   Problem Relation Name Age of Onset    Hypertension Father      Heart disease Father       Social History     Tobacco Use    Smoking status: Every Day     Current packs/day: 1.00     Average packs/day: 1 pack/day for 8.2 years (8.2 ttl pk-yrs)     Types: Cigarettes     Start date: 6/12/2016   Substance Use Topics    Alcohol use: Yes     Comment: socially    Drug use: Not Currently     Frequency: 3.0 times per week     Types: Marijuana     Review of Systems   Musculoskeletal:  Positive for arthralgias and joint swelling.   All other systems reviewed and are negative.      Physical Exam     Initial Vitals [09/02/24 1625]   BP Pulse Resp Temp SpO2   (!) 141/99 (!) 128 (!) 22 98.1 °F (36.7 °C) 97 %      MAP       --         Physical Exam    HENT:   Head: Normocephalic.   Eyes:  Pupils are equal, round, and reactive to light.   Neck: Neck supple.   Normal range of motion.  Cardiovascular:      Exam reveals no friction rub.       No murmur heard.  Pulmonary/Chest: Breath sounds normal. No respiratory distress. He has no wheezes. He has no rhonchi. He has no rales. He exhibits no tenderness.   Musculoskeletal:         General: Tenderness (patient has tenderness and swelling to right wrist.) and edema present.      Cervical back: Normal range of motion and neck supple.      Comments: Patient has a contusion/abrasion to right hip.  Patient was ambulatory at this time     Skin: Capillary refill takes less than 2 seconds. No erythema.   Psychiatric: He has a normal mood and affect. Thought content normal.         ED Course   Splint Application    Date/Time: 9/2/2024 6:08 PM    Performed by: Husam Raines NP  Authorized by: Husam Garcia MD  Location details: right wrist  Splint type: volar short arm  Post-procedure: The splinted body part was neurovascularly unchanged following the procedure.  Patient tolerance: Patient tolerated the procedure well with no immediate complications        Labs Reviewed - No data to display       Imaging Results              X-Ray Elbow Complete Right (Final result)  Result time 09/02/24 17:43:27      Final result by Leoncio Gómez MD (09/02/24 17:43:27)                   Impression:      No acute findings.      Electronically signed by: Leoncio Gómez  Date:    09/02/2024  Time:    17:43               Narrative:    EXAMINATION:  XR ELBOW COMPLETE 3 VIEW RIGHT    CLINICAL HISTORY:  . Pain in right elbow    TECHNIQUE:  AP, lateral, and oblique views of the right elbow were performed.    COMPARISON:  None    FINDINGS:  No acute fracture, dislocation, or traumatic malalignment.  Joint spaces are maintained.                                       X-Ray Wrist Complete Right (Final result)  Result time 09/02/24 17:48:54      Final result by Rico  MD Leoncio (09/02/24 17:48:54)                   Impression:      As above.      Electronically signed by: Leoncio Gómez  Date:    09/02/2024  Time:    17:48               Narrative:    EXAMINATION:  XR WRIST COMPLETE 3 VIEWS RIGHT    CLINICAL HISTORY:  Pain in right wrist    TECHNIQUE:  PA, lateral, and oblique views of the right wrist were performed.    COMPARISON:  None    FINDINGS:  Acute minimally displaced triquetral avulsion fracture.  Overlying soft tissue swelling.                                       Medications   HYDROcodone-acetaminophen 5-325 mg per tablet 1 tablet (1 tablet Oral Given 9/2/24 1642)     Medical Decision Making  Differential diagnosis includes wrist fracture, elbow fracture, hand fracture, hip fracture, mechanical fall, head injury    X-ray of right elbow reveals no acute fracture dislocation  X-ray of right wrist has the following impression per radiologist--  Acute minimally displaced triquetral avulsion fracture.  Overlying soft tissue swelling.  Volar short-arm splint placed  Right upper extremity remained neurovascularly intact pre and post splint placement.  Patient advised to follow-up with orthopedic provider.  Will place patient on Norco short-term to help control right hand pain.  Patient stable at time of discharge in no acute distress.  No life-threatening illnesses were found during ER visit today.  Patient was instructed to follow-up with PCP or other recommended specialist within the next 48-72 hours.  Patient was instructed to return to ER immediately for any worsening or concerning symptoms.  All discharge instructions discussed with patient, and patient agrees to comply with discharge instructions given today.       Amount and/or Complexity of Data Reviewed  Radiology: ordered.    Risk  Prescription drug management.                                      Clinical Impression:  Final diagnoses:  [M25.551] Right hip pain  [M25.521] Elbow pain, right  [M25.531]  Right wrist pain  [S62.91XA] Closed fracture of right hand, initial encounter (Primary)          ED Disposition Condition    Discharge Stable          ED Prescriptions       Medication Sig Dispense Start Date End Date Auth. Provider    HYDROcodone-acetaminophen (NORCO) 5-325 mg per tablet Take 1 tablet by mouth every 8 (eight) hours as needed for Pain. 12 tablet 9/2/2024 -- Husam Raines NP          Follow-up Information       Follow up With Specialties Details Why Contact Info    Link Oviedo MD Orthopedic Surgery Call in 1 week  [address] [place] [address] ORTHOPEDICS  [facility] [address]  [phone]               Husam Raines NP  09/02/24 0708

## 2024-09-03 ENCOUNTER — TELEPHONE (OUTPATIENT)
Dept: FAMILY MEDICINE | Facility: CLINIC | Age: 24
End: 2024-09-03
Payer: MEDICAID

## 2024-09-03 NOTE — TELEPHONE ENCOUNTER
Called and spoke to pts mother who states that they were told to follow up with orthopedics. I gave her the number to [facility] to schedule.

## 2024-09-03 NOTE — TELEPHONE ENCOUNTER
----- Message from Good Shah sent at 9/3/2024  3:12 PM CDT -----  Contact: Daniel- Ana Middleton  MRN: [id number]  : 2000  PCP: Laly Unger  Home Phone      [phone]  Work Phone      Not on file.  Mobile          [phone]      MESSAGE: requesting appt for ER f/u - fell out of a tree     Call Ana @ [phone]    PCP: Cornel

## 2024-09-04 ENCOUNTER — TELEPHONE (OUTPATIENT)
Dept: ORTHOPEDICS | Facility: CLINIC | Age: 24
End: 2024-09-04
Payer: MEDICAID

## 2024-09-04 NOTE — TELEPHONE ENCOUNTER
Called and spoke with PT mother, I explained to her that unfortunately Isis will be out of office until next Tuesday. Considering that it is a fracture and the ER put him in a splint, it is best that they contact Mckayla Escobar to be seen sooner. Mother was very understanding and is contacting Patiencetommie      ----- Message from Allison Castro sent at 2024  8:23 AM CDT -----  Contact: Patient  Reed Middleton  MRN: [id number]  : 2000  PCP: Laly Unger  Home Phone      [phone]  Work Phone      Not on file.  Mobile          [phone]      MESSAGE: Patient has a fractured wrist and would like a returned call to be scheduled for the soonest appointment available. He has imaging in her chart from the ER.     PHONE; [phone]

## 2024-09-13 ENCOUNTER — OFFICE VISIT (OUTPATIENT)
Dept: FAMILY MEDICINE | Facility: CLINIC | Age: 24
End: 2024-09-13
Payer: MEDICAID

## 2024-09-13 ENCOUNTER — TELEPHONE (OUTPATIENT)
Dept: ORTHOPEDICS | Facility: CLINIC | Age: 24
End: 2024-09-13
Payer: MEDICAID

## 2024-09-13 ENCOUNTER — APPOINTMENT (OUTPATIENT)
Dept: RADIOLOGY | Facility: CLINIC | Age: 24
End: 2024-09-13
Attending: FAMILY MEDICINE
Payer: MEDICAID

## 2024-09-13 VITALS
RESPIRATION RATE: 20 BRPM | WEIGHT: 130.63 LBS | HEART RATE: 72 BPM | BODY MASS INDEX: 23.14 KG/M2 | SYSTOLIC BLOOD PRESSURE: 108 MMHG | HEIGHT: 63 IN | DIASTOLIC BLOOD PRESSURE: 64 MMHG

## 2024-09-13 DIAGNOSIS — S62.111D CLOSED DISPLACED FRACTURE OF TRIQUETRUM OF RIGHT WRIST WITH ROUTINE HEALING, SUBSEQUENT ENCOUNTER: ICD-10-CM

## 2024-09-13 DIAGNOSIS — S62.111D CLOSED DISPLACED FRACTURE OF TRIQUETRUM OF RIGHT WRIST WITH ROUTINE HEALING, SUBSEQUENT ENCOUNTER: Primary | ICD-10-CM

## 2024-09-13 PROCEDURE — 73110 X-RAY EXAM OF WRIST: CPT | Mod: 26,RT,, | Performed by: RADIOLOGY

## 2024-09-13 PROCEDURE — 99999 PR PBB SHADOW E&M-EST. PATIENT-LVL III: CPT | Mod: PBBFAC,,, | Performed by: FAMILY MEDICINE

## 2024-09-13 PROCEDURE — 99213 OFFICE O/P EST LOW 20 MIN: CPT | Mod: PBBFAC,25 | Performed by: FAMILY MEDICINE

## 2024-09-13 PROCEDURE — 73110 X-RAY EXAM OF WRIST: CPT | Mod: TC,PO,RT

## 2024-09-13 RX ORDER — IBUPROFEN 600 MG/1
600 TABLET ORAL EVERY 8 HOURS PRN
Qty: 20 TABLET | Refills: 0 | Status: SHIPPED | OUTPATIENT
Start: 2024-09-13

## 2024-09-13 NOTE — TELEPHONE ENCOUNTER
Attempted to call pt in regards to him being in the clinic to come get a brace per Dr. Unger, pt left before I could give brace. Tried to call no answer, lvm. I notified Barbara Talbert LPN and she will try calling pt mom.

## 2024-09-13 NOTE — PROGRESS NOTES
Subjective:       Patient ID: Reed Middleton is a 24 y.o. male.    Chief Complaint: Follow-up (ER follow up for fractured right wrist)    History of Present Illness  The patient presents for evaluation of a wrist fracture.    He sustained the fracture after falling from a tree approximately 1.5 weeks ago. He was informed that a referral from his insurance was necessary to consult an orthopedist. Despite the injury, he retains the ability to move his fingers. However, any attempt to twist his wrist results in significant pain. He reports that the condition has improved since the initial incident.    Additionally, he experienced bruising on his hip due to the fall, but it is currently not causing him any discomfort.    He had a previous wrist sprain in April 2024.    Objective:      Physical Exam        Physical Exam  Musculoskeletal:         General: Swelling and tenderness present.      Comments: Pain over dorsal mid wrist.    Decreased flexion and extension through wrist but adduction and abduction in tact. Normal sensation to hand and mobility in fingers         Results  Imaging  Acute minimally displaced triquetral avulsion fracture in the wrist.  Assessment:           1. Closed displaced fracture of triquetrum of right wrist with routine healing, subsequent encounter        Plan:     Assessment & Plan  1. Minimally displaced triquetral avulsion fracture.  The patient presents with a minimally displaced triquetral avulsion fracture in his right wrist, which occurred approximately 10 days ago after falling from a tree. He reports significant pain with certain movements but can move his fingers. An x-ray of the wrist will be ordered to evaluate the current state of the fracture. Depending on the x-ray results, treatment options such as casting or referral to an orthopedist will be considered. He has been advised to avoid moving the wrist excessively to prevent further injury.    2. History of wrist  "sprain.  The patient has a history of a severe wrist sprain in April, which required medical evaluation due to significant swelling and pain. This history may be relevant in assessing the current injury and planning treatment.        Reed Joyce" was seen today for follow-up.    Diagnoses and all orders for this visit:    Closed displaced fracture of triquetrum of right wrist with routine healing, subsequent encounter  -     X-Ray Wrist Complete Right; Future  -     WRIST BRACE FOR HOME USE  -     Ambulatory referral/consult to Orthopedics; Future    Other orders  -     ibuprofen (ADVIL,MOTRIN) 600 MG tablet; Take 1 tablet (600 mg total) by mouth every 8 (eight) hours as needed for Pain.      Spoke to ortho - will brace for now and consider casting. Patient to obtain wrist brace and instructed on importance of immobilization for 6 weeks.    RTC if condition acutely worsens or any other concerns, otherwise RTC as scheduled      This note was generated with the assistance of ambient listening technology. Verbal consent was obtained by the patient and accompanying visitor(s) for the recording of patient appointment to facilitate this note. I attest to having reviewed and edited the generated note for accuracy, though some syntax or spelling errors may persist. Please contact the author of this note for any clarification.    "

## 2024-10-22 ENCOUNTER — PATIENT MESSAGE (OUTPATIENT)
Dept: RESEARCH | Facility: HOSPITAL | Age: 24
End: 2024-10-22
Payer: MEDICAID

## 2025-05-19 ENCOUNTER — TELEPHONE (OUTPATIENT)
Dept: FAMILY MEDICINE | Facility: CLINIC | Age: 25
End: 2025-05-19
Payer: MEDICAID

## 2025-05-19 NOTE — TELEPHONE ENCOUNTER
----- Message from Good sent at 5/19/2025  8:38 AM CDT -----  Contact: Daniel Obandoreese MiddletonMRN: [id number]: 2000PCP: Laly Unger.Home Phone      [phone] Phone      Not on file.Mobile          [phone]: had seizure - still not feeling right -- requesting appt with Dr Unger, sooner than 6/17/25Calgold Enriquez @ [phone]: Cornel

## 2025-05-20 ENCOUNTER — HOSPITAL ENCOUNTER (EMERGENCY)
Facility: HOSPITAL | Age: 25
Discharge: HOME | End: 2025-05-20
Attending: SURGERY
Payer: MEDICAID

## 2025-05-20 VITALS
TEMPERATURE: 99 F | OXYGEN SATURATION: 99 % | HEART RATE: 63 BPM | RESPIRATION RATE: 16 BRPM | BODY MASS INDEX: 25.32 KG/M2 | WEIGHT: 142.88 LBS | DIASTOLIC BLOOD PRESSURE: 90 MMHG | SYSTOLIC BLOOD PRESSURE: 133 MMHG | HEIGHT: 63 IN

## 2025-05-20 DIAGNOSIS — F41.9 ANXIETY: Primary | ICD-10-CM

## 2025-05-20 DIAGNOSIS — R06.02 SOB (SHORTNESS OF BREATH): ICD-10-CM

## 2025-05-20 LAB
ABSOLUTE EOSINOPHIL (OHS): 0.05 K/UL
ABSOLUTE MONOCYTE (OHS): 0.41 K/UL (ref 0.3–1)
ABSOLUTE NEUTROPHIL COUNT (OHS): 5.79 K/UL (ref 1.8–7.7)
ALBUMIN SERPL BCP-MCNC: 4.3 G/DL (ref 3.5–5.2)
ALP SERPL-CCNC: 64 UNIT/L (ref 40–150)
ALT SERPL W/O P-5'-P-CCNC: 41 UNIT/L (ref 10–44)
AMPHET UR QL SCN: NEGATIVE
ANION GAP (OHS): 12 MMOL/L (ref 8–16)
AST SERPL-CCNC: 42 UNIT/L (ref 11–45)
BARBITURATE SCN PRESENT UR: NEGATIVE
BASOPHILS # BLD AUTO: 0.03 K/UL
BASOPHILS NFR BLD AUTO: 0.4 %
BENZODIAZ UR QL SCN: NEGATIVE
BILIRUB SERPL-MCNC: 1.5 MG/DL (ref 0.1–1)
BILIRUB UR QL STRIP.AUTO: NEGATIVE
BNP SERPL-MCNC: 17 PG/ML (ref 0–99)
BUN SERPL-MCNC: 4 MG/DL (ref 6–20)
CALCIUM SERPL-MCNC: 9.8 MG/DL (ref 8.7–10.5)
CANNABINOIDS UR QL SCN: ABNORMAL
CHLORIDE SERPL-SCNC: 104 MMOL/L (ref 95–110)
CLARITY UR: CLEAR
CO2 SERPL-SCNC: 22 MMOL/L (ref 23–29)
COCAINE UR QL SCN: NEGATIVE
COLOR UR AUTO: YELLOW
CREAT SERPL-MCNC: 0.7 MG/DL (ref 0.5–1.4)
CREAT UR-MCNC: 29.4 MG/DL (ref 23–375)
D DIMER PPP IA.FEU-MCNC: <0.19 MG/L FEU
ERYTHROCYTE [DISTWIDTH] IN BLOOD BY AUTOMATED COUNT: 11.9 % (ref 11.5–14.5)
GFR SERPLBLD CREATININE-BSD FMLA CKD-EPI: >60 ML/MIN/1.73/M2
GLUCOSE SERPL-MCNC: 116 MG/DL (ref 70–110)
GLUCOSE UR QL STRIP: NEGATIVE
HCT VFR BLD AUTO: 43.6 % (ref 40–54)
HGB BLD-MCNC: 15.6 GM/DL (ref 14–18)
HGB UR QL STRIP: NEGATIVE
HOLD SPECIMEN: NORMAL
IMM GRANULOCYTES # BLD AUTO: 0.03 K/UL (ref 0–0.04)
IMM GRANULOCYTES NFR BLD AUTO: 0.4 % (ref 0–0.5)
KETONES UR QL STRIP: NEGATIVE
LEUKOCYTE ESTERASE UR QL STRIP: NEGATIVE
LYMPHOCYTES # BLD AUTO: 1.76 K/UL (ref 1–4.8)
MCH RBC QN AUTO: 32.2 PG (ref 27–31)
MCHC RBC AUTO-ENTMCNC: 35.8 G/DL (ref 32–36)
MCV RBC AUTO: 90 FL (ref 82–98)
METHADONE UR QL SCN: NEGATIVE
NITRITE UR QL STRIP: NEGATIVE
NUCLEATED RBC (/100WBC) (OHS): 0 /100 WBC
OPIATES UR QL SCN: NEGATIVE
PCP UR QL: NEGATIVE
PH UR STRIP: 7 [PH]
PLATELET # BLD AUTO: 245 K/UL (ref 150–450)
PMV BLD AUTO: 10.9 FL (ref 9.2–12.9)
POTASSIUM SERPL-SCNC: 3.6 MMOL/L (ref 3.5–5.1)
PROT SERPL-MCNC: 7.8 GM/DL (ref 6–8.4)
PROT UR QL STRIP: NEGATIVE
RBC # BLD AUTO: 4.85 M/UL (ref 4.6–6.2)
RELATIVE EOSINOPHIL (OHS): 0.6 %
RELATIVE LYMPHOCYTE (OHS): 21.8 % (ref 18–48)
RELATIVE MONOCYTE (OHS): 5.1 % (ref 4–15)
RELATIVE NEUTROPHIL (OHS): 71.7 % (ref 38–73)
SODIUM SERPL-SCNC: 138 MMOL/L (ref 136–145)
SP GR UR STRIP: <=1.005
TROPONIN I SERPL DL<=0.01 NG/ML-MCNC: <0.006 NG/ML
UROBILINOGEN UR STRIP-ACNC: NEGATIVE EU/DL
WBC # BLD AUTO: 8.07 K/UL (ref 3.9–12.7)

## 2025-05-20 PROCEDURE — 84484 ASSAY OF TROPONIN QUANT: CPT | Performed by: SURGERY

## 2025-05-20 PROCEDURE — 25000003 PHARM REV CODE 250: Performed by: SURGERY

## 2025-05-20 PROCEDURE — 63600175 PHARM REV CODE 636 W HCPCS: Performed by: SURGERY

## 2025-05-20 PROCEDURE — 93005 ELECTROCARDIOGRAM TRACING: CPT

## 2025-05-20 PROCEDURE — 80053 COMPREHEN METABOLIC PANEL: CPT | Performed by: SURGERY

## 2025-05-20 PROCEDURE — 85025 COMPLETE CBC W/AUTO DIFF WBC: CPT | Performed by: SURGERY

## 2025-05-20 PROCEDURE — 96374 THER/PROPH/DIAG INJ IV PUSH: CPT

## 2025-05-20 PROCEDURE — 93010 ELECTROCARDIOGRAM REPORT: CPT | Mod: ,,, | Performed by: INTERNAL MEDICINE

## 2025-05-20 PROCEDURE — 99285 EMERGENCY DEPT VISIT HI MDM: CPT | Mod: 25

## 2025-05-20 PROCEDURE — 85379 FIBRIN DEGRADATION QUANT: CPT | Performed by: SURGERY

## 2025-05-20 PROCEDURE — 80307 DRUG TEST PRSMV CHEM ANLYZR: CPT | Performed by: SURGERY

## 2025-05-20 PROCEDURE — 81003 URINALYSIS AUTO W/O SCOPE: CPT | Mod: 59 | Performed by: SURGERY

## 2025-05-20 PROCEDURE — 83880 ASSAY OF NATRIURETIC PEPTIDE: CPT | Performed by: SURGERY

## 2025-05-20 RX ORDER — HYDROXYZINE PAMOATE 50 MG/1
50 CAPSULE ORAL EVERY 6 HOURS PRN
Qty: 20 CAPSULE | Refills: 0 | Status: SHIPPED | OUTPATIENT
Start: 2025-05-20

## 2025-05-20 RX ORDER — LORAZEPAM 2 MG/ML
1 INJECTION INTRAMUSCULAR
Status: COMPLETED | OUTPATIENT
Start: 2025-05-20 | End: 2025-05-20

## 2025-05-20 RX ORDER — ASPIRIN 325 MG
325 TABLET ORAL
Status: COMPLETED | OUTPATIENT
Start: 2025-05-20 | End: 2025-05-20

## 2025-05-20 RX ADMIN — LORAZEPAM 1 MG: 2 INJECTION INTRAMUSCULAR; INTRAVENOUS at 07:05

## 2025-05-20 RX ADMIN — ASPIRIN 325 MG ORAL TABLET 325 MG: 325 PILL ORAL at 06:05

## 2025-05-20 NOTE — ED TRIAGE NOTES
Patient reports had a seizure Saturday, seen at [facility]. Patient states has been having panic attacks since Saturday night.

## 2025-05-21 NOTE — ED PROVIDER NOTES
Encounter Date: 5/20/2025       History     Chief Complaint   Patient presents with    Anxiety    Shortness of Breath     History of Present Illness  Reed Middleton is a 25 y.o. male that presents with anxiety tonight  Patient has a long history of epilepsy with a seizure last weekend on HX  Patient was seen & cleared by the [place] emergency room on HX  Patient however states he has had significant anxiety since the seizure  Patient has palpitations today, shortness of breath & anxiety reported  Patient however has no cardiac history with normal EKG on evaluation    The history is provided by the patient.     Review of patient's allergies indicates:  No Known Allergies  Past Medical History:   Diagnosis Date    ADHD (attention deficit hyperactivity disorder)     Epilepsy      Past Surgical History:   Procedure Laterality Date    ADENOIDECTOMY      TONSILLECTOMY       Family History   Problem Relation Name Age of Onset    Hypertension Father      Heart disease Father       Social History[1]    Review of Systems   Constitutional:  Negative for activity change, appetite change, fatigue, fever and unexpected weight change.   HENT:  Negative for congestion, ear pain, mouth sores, nosebleeds, rhinorrhea, sinus pressure, sneezing and sore throat.    Eyes:  Negative for pain, discharge, redness and itching.   Respiratory:  Positive for shortness of breath. Negative for apnea, cough and chest tightness.    Cardiovascular:  Positive for palpitations. Negative for chest pain and leg swelling.   Gastrointestinal:  Negative for abdominal distention, abdominal pain, anal bleeding, constipation, diarrhea, nausea and vomiting.   Endocrine: Negative.    Genitourinary:  Negative for dysuria, enuresis, flank pain and frequency.   Musculoskeletal:  Negative for arthralgias, back pain, neck pain and neck stiffness.   Skin:  Negative for color change and wound.   Allergic/Immunologic: Negative.    Neurological:  Negative for  dizziness, tremors, syncope, facial asymmetry, speech difficulty, weakness, light-headedness, numbness and headaches.   Hematological:  Negative for adenopathy. Does not bruise/bleed easily.   Psychiatric/Behavioral:  Negative for agitation, behavioral problems, hallucinations, self-injury and suicidal ideas. The patient is nervous/anxious.        Physical Exam     Initial Vitals [05/20/25 1513]   BP Pulse Resp Temp SpO2   (!) 142/91 98 20 98.5 °F (36.9 °C) 97 %      MAP       --         Physical Exam    Nursing note and vitals reviewed.  Constitutional: Vital signs are normal. He appears well-developed and well-nourished. He is cooperative.   HENT:   Head: Normocephalic and atraumatic. Mouth/Throat: Uvula is midline and mucous membranes are normal.   Eyes: Conjunctivae, EOM and lids are normal. Pupils are equal, round, and reactive to light.   Neck: Neck supple.   Normal range of motion.   Full passive range of motion without pain.     Cardiovascular:  Normal rate, regular rhythm, S1 normal, S2 normal, normal heart sounds, intact distal pulses and normal pulses.           Pulmonary/Chest: Effort normal and breath sounds normal.   Abdominal: Abdomen is soft and flat. Bowel sounds are normal.   Musculoskeletal:         General: Normal range of motion.      Cervical back: Full passive range of motion without pain, normal range of motion and neck supple.     Neurological: He is alert and oriented to person, place, and time. He has normal strength.   Skin: Skin is warm, dry and intact. Capillary refill takes less than 2 seconds.         ED Course   Procedures  Labs Reviewed   COMPREHENSIVE METABOLIC PANEL - Abnormal       Result Value    Sodium 138      Potassium 3.6      Chloride 104      CO2 22 (*)     Glucose 116 (*)     BUN 4 (*)     Creatinine 0.7      Calcium 9.8      Protein Total 7.8      Albumin 4.3      Bilirubin Total 1.5 (*)     ALP 64      AST 42      ALT 41      Anion Gap 12      eGFR >60     URINALYSIS,  "REFLEX TO URINE CULTURE - Abnormal    Color, UA Yellow      Appearance, UA Clear      pH, UA 7.0      Spec Grav UA <=1.005 (*)     Protein, UA Negative      Glucose, UA Negative      Ketones, UA Negative      Bilirubin, UA Negative      Blood, UA Negative      Nitrites, UA Negative      Urobilinogen, UA Negative      Leukocyte Esterase, UA Negative     DRUG SCREEN PANEL, URINE EMERGENCY - Abnormal    Benzodiazepine, Urine Negative      Methadone, Urine Negative      Cocaine, Urine Negative      Opiates, Urine Negative      Barbiturates, Urine Negative      Amphetamines, Urine Negative      THC Presumptive Positive (*)     Phencyclidine, Urine Negative      Urine Creatinine 29.4      Narrative:     This screen includes the following classes of drugs at the listed cut-off:     Benzodiazepines:        200 ng/ml   Methadone:              300 ng/ml   Cocaine metabolite:     300 ng/ml   Opiates:                300 ng/ml   Barbiturates:           200 ng/ml   Amphetamines:           1000 ng/ml   Marijuana metabs (THC): 50 ng/ml   Phencyclidine (PCP):    25 ng/ml     This is a screening test. If results do not correlate with clinical presentation, then a confirmatory send out test is advised.    This report is intended for use in clinical monitoring and management of patients. It is not intended for use in employment related drug testing."   CBC WITH DIFFERENTIAL - Abnormal    WBC 8.07      RBC 4.85      HGB 15.6      HCT 43.6      MCV 90      MCH 32.2 (*)     MCHC 35.8      RDW 11.9      Platelet Count 245      MPV 10.9      Nucleated RBC 0      Neut % 71.7      Lymph % 21.8      Mono % 5.1      Eos % 0.6      Basophil % 0.4      Imm Grans % 0.4      Neut # 5.79      Lymph # 1.76      Mono # 0.41      Eos # 0.05      Baso # 0.03      Imm Grans # 0.03     TROPONIN I - Normal    Troponin-I <0.006     B-TYPE NATRIURETIC PEPTIDE - Normal    BNP 17     D DIMER, QUANTITATIVE - Normal    D-Dimer <0.19     CBC W/ AUTO DIFFERENTIAL "    Narrative:     The following orders were created for panel order CBC Auto Differential.  Procedure                               Abnormality         Status                     ---------                               -----------         ------                     CBC with Differential[[id number]]       Abnormal            Final result                 Please view results for these tests on the individual orders.   GREY TOP URINE HOLD     EKG Readings: (Independently Interpreted)   No STEMI  Normal sinus rhythm  No ectopy  Normal conduction  Normal ST segments  Normal T-wave  Normal axis  Heart rate in the 60s       Imaging Results              X-Ray Chest 1 View (Final result)  Result time 05/20/25 18:11:36      Final result by Leoncio Gómez MD (05/20/25 18:11:36)                   Impression:      No acute findings      Electronically signed by: Leoncio Gómez  Date:    05/20/2025  Time:    18:11               Narrative:    EXAMINATION:  XR CHEST 1 VIEW    CLINICAL HISTORY:  Shortness of breath;    TECHNIQUE:  Single frontal view of the chest was performed.    COMPARISON:  01/21/2020    FINDINGS:  Lungs are clear. No focal consolidation. No pleural effusion. No pneumothorax. Normal heart size.                                       Medications   aspirin tablet 325 mg (325 mg Oral Given 5/20/25 1814)   LORazepam injection 1 mg (1 mg Intravenous Given 5/20/25 1941)     Medical Decision Making  Differential: Anxiety, angina, STEMI, non-STEMI, GERD, muscle pain, pleurisy    Problems Addressed:  Anxiety: complicated acute illness or injury  SOB (shortness of breath): complicated acute illness or injury    Amount and/or Complexity of Data Reviewed  Labs: ordered. Decision-making details documented in ED Course.  Radiology: ordered and independent interpretation performed.  ECG/medicine tests: ordered and independent interpretation performed.    ED Management & Risks of Complication, Morbidity, &  Mortality:  Normal sinus rhythm on EKG with no ST elevation tonight  Chest x-ray shows no acute findings in the emergency room today  (-) troponin, (-) lab work in the emergency room tonight  Asymptomatic after IV Ativan in the emergency room tonight  Heart score of 0, patient will follow-up with Cardiology outpatient  Patient will follow-up with primary care & Psychiatry for anxiety  Pt/Family counseled to return to the ER with any concerns on DC    Need for Hospitalization or Surgery with Social Determinants of Health:  This patient does not need to be hospitalized on ER evaluation today  The patient's diagnosis is not limited by social determinants of health  Does not require surgery or procedure (major or minor), no risk factors    Final diagnoses:  [R06.02] SOB (shortness of breath)  [F41.9] Anxiety (Primary)          ED Disposition Condition    Discharge Stable          ED Prescriptions       Medication Sig Dispense Start Date End Date Auth. Provider    hydrOXYzine pamoate (VISTARIL) 50 MG Cap Take 1 capsule (50 mg total) by mouth every 6 (six) hours as needed (Anxiety). 20 capsule 5/20/2025 -- Husam Garcia MD          Follow-up Information       Follow up With Specialties Details Why Contact Info    Laly Unger MD Family Medicine, Hospitalist Go in 2 days  [address] [place] [id number]  [phone]                     [1]   Social History  Tobacco Use    Smoking status: Every Day     Current packs/day: 1.00     Average packs/day: 1 pack/day for 8.9 years (8.9 ttl pk-yrs)     Types: Cigarettes     Start date: 6/12/2016   Substance Use Topics    Alcohol use: Yes     Comment: socially    Drug use: Not Currently     Frequency: 3.0 times per week     Types: Marijuana        Husam Garcia MD  05/20/25 2004

## 2025-05-22 LAB
OHS QRS DURATION: 86 MS
OHS QTC CALCULATION: 446 MS

## 2025-05-31 ENCOUNTER — HOSPITAL ENCOUNTER (EMERGENCY)
Facility: HOSPITAL | Age: 25
Discharge: HOME | End: 2025-05-31
Payer: MEDICAID

## 2025-05-31 VITALS
OXYGEN SATURATION: 99 % | RESPIRATION RATE: 18 BRPM | SYSTOLIC BLOOD PRESSURE: 112 MMHG | WEIGHT: 140.19 LBS | HEIGHT: 63 IN | TEMPERATURE: 98 F | DIASTOLIC BLOOD PRESSURE: 69 MMHG | BODY MASS INDEX: 24.84 KG/M2 | HEART RATE: 77 BPM

## 2025-05-31 DIAGNOSIS — T50.901A OVERDOSE: ICD-10-CM

## 2025-05-31 DIAGNOSIS — T50.901A ACCIDENTAL DRUG OVERDOSE, INITIAL ENCOUNTER: ICD-10-CM

## 2025-05-31 DIAGNOSIS — F41.9 ANXIETY: Primary | ICD-10-CM

## 2025-05-31 LAB
AMPHET UR QL SCN: NEGATIVE
APAP SERPL-MCNC: <3 UG/ML (ref 10–20)
BARBITURATE SCN PRESENT UR: NEGATIVE
BENZODIAZ UR QL SCN: NEGATIVE
BILIRUB UR QL STRIP.AUTO: NEGATIVE
CANNABINOIDS UR QL SCN: ABNORMAL
CK SERPL-CCNC: 121 U/L (ref 20–200)
CLARITY UR: CLEAR
COCAINE UR QL SCN: NEGATIVE
COLOR UR AUTO: YELLOW
CREAT UR-MCNC: 24.8 MG/DL (ref 23–375)
ETHANOL SERPL-MCNC: <10 MG/DL
GLUCOSE UR QL STRIP: NEGATIVE
HGB UR QL STRIP: NEGATIVE
HOLD SPECIMEN: NORMAL
KETONES UR QL STRIP: NEGATIVE
LEUKOCYTE ESTERASE UR QL STRIP: NEGATIVE
METHADONE UR QL SCN: NEGATIVE
NITRITE UR QL STRIP: NEGATIVE
OPIATES UR QL SCN: NEGATIVE
PCP UR QL: NEGATIVE
PH UR STRIP: 6 [PH]
PROT UR QL STRIP: NEGATIVE
SALICYLATES SERPL-MCNC: <5 MG/DL (ref 15–30)
SP GR UR STRIP: <=1.005
TROPONIN I SERPL DL<=0.01 NG/ML-MCNC: <0.006 NG/ML
TSH SERPL-ACNC: 1.55 UIU/ML (ref 0.4–4)
UROBILINOGEN UR STRIP-ACNC: NEGATIVE EU/DL

## 2025-05-31 PROCEDURE — 80179 DRUG ASSAY SALICYLATE: CPT

## 2025-05-31 PROCEDURE — 84484 ASSAY OF TROPONIN QUANT: CPT

## 2025-05-31 PROCEDURE — 93010 ELECTROCARDIOGRAM REPORT: CPT | Mod: ,,, | Performed by: INTERNAL MEDICINE

## 2025-05-31 PROCEDURE — 63600175 PHARM REV CODE 636 W HCPCS

## 2025-05-31 PROCEDURE — 82550 ASSAY OF CK (CPK): CPT

## 2025-05-31 PROCEDURE — 99900035 HC TECH TIME PER 15 MIN (STAT)

## 2025-05-31 PROCEDURE — 80307 DRUG TEST PRSMV CHEM ANLYZR: CPT

## 2025-05-31 PROCEDURE — 80143 DRUG ASSAY ACETAMINOPHEN: CPT

## 2025-05-31 PROCEDURE — 99284 EMERGENCY DEPT VISIT MOD MDM: CPT | Mod: 25

## 2025-05-31 PROCEDURE — 84443 ASSAY THYROID STIM HORMONE: CPT

## 2025-05-31 PROCEDURE — 96374 THER/PROPH/DIAG INJ IV PUSH: CPT

## 2025-05-31 PROCEDURE — 25000003 PHARM REV CODE 250

## 2025-05-31 PROCEDURE — 96361 HYDRATE IV INFUSION ADD-ON: CPT

## 2025-05-31 PROCEDURE — 82077 ASSAY SPEC XCP UR&BREATH IA: CPT

## 2025-05-31 PROCEDURE — 81003 URINALYSIS AUTO W/O SCOPE: CPT

## 2025-05-31 RX ORDER — LORAZEPAM 2 MG/ML
1 INJECTION INTRAMUSCULAR
Status: COMPLETED | OUTPATIENT
Start: 2025-05-31 | End: 2025-05-31

## 2025-05-31 RX ORDER — SODIUM BICARBONATE 1 MEQ/ML
50 SYRINGE (ML) INTRAVENOUS ONCE AS NEEDED
Status: DISCONTINUED | OUTPATIENT
Start: 2025-05-31 | End: 2025-05-31 | Stop reason: HOSPADM

## 2025-05-31 RX ADMIN — SODIUM CHLORIDE 1000 ML: 9 INJECTION, SOLUTION INTRAVENOUS at 03:05

## 2025-05-31 RX ADMIN — LORAZEPAM 1 MG: 2 INJECTION INTRAMUSCULAR; INTRAVENOUS at 03:05

## 2025-05-31 NOTE — PROVIDER PROGRESS NOTES - EMERGENCY DEPT.
ER Physician Progress/Interval Note     Patient was seen by nighttime physician after taking Benadryl  Patient was taking Benadryl for anxiety at home on interview  Patient denies any suicidal intent based on interview this morning  Patient was monitored in the ER almost 4 to 5 hours without problem   Patient will follow-up with PCP & neurologist on Monday morning  Do not take anymore Benadryl going forward after discharge tonight  Voiced understanding with cousin at bedside, outpatient follow-up       Husam Garcia M.D. 8:24 AM 5/31/2025

## 2025-05-31 NOTE — ED TRIAGE NOTES
25 y.o. male presents to ER ED 06/ED 06   Chief Complaint   Patient presents with    Drug Overdose     PT to ED states he took too much medication. Pt states he took 10 pills of benadryl. States he took as much as he did because he was anxious. Pt denies SI and HI. Pt states he feels like he's having a panic attack and states he has chest pain. Also feels like he's about to pass out.    Pt states he initially took the benadryl at 6am then took it throughout the entire day.

## 2025-05-31 NOTE — ED PROVIDER NOTES
Encounter Date: 5/31/2025    Source of History:   Patient and medical record, without language barrier or      Chief complaint:  Drug Overdose (PT to ED states he took too much medication. Pt states he took 10 pills of benadryl. States he took as much as he did because he was anxious. Pt denies SI and HI. Pt states he feels like he's having a panic attack and states he has chest pain. Also feels like he's about to pass out. )    HPI:  Reed Middleton is a 25 y.o. male with epilepsy presenting with chief complaint of accidental drug overdose.  Patient presents to the emergency department stating that he took too much medication.  He took about 10 pills of Benadryl today and thinks that he may have accidentally overdose.  He states that an ED physician at an outside hospital told him to take Benadryl when he has anxiety.  Patient was especially anxious yesterday and started taking Benadryl pills at 6:00 a.m..  Between 6:00 a.m. yesterday morning and 3:00 a.m. this morning he estimates he took about 10 pills.  He woke up feeling unwell feeling she is having a panic attack and chest pain just prior to arrival also endorses lightheadedness and feeling of about to pass out.  No syncope or shortness of breath    This is the extent to the patients complaints today here in the emergency department.    ROS: A review of systems was conducted with pertinent positive and negative findings documented in HPI with all other systems reviewed and negative.  ROS    Review of patient's allergies indicates:  No Known Allergies    PMH:  As per HPI and below:  Past Medical History:   Diagnosis Date    ADHD (attention deficit hyperactivity disorder)     Epilepsy      Past Surgical History:   Procedure Laterality Date    ADENOIDECTOMY      TONSILLECTOMY       Social History     Socioeconomic History    Marital status: Single   Tobacco Use    Smoking status: Every Day     Current packs/day: 1.00     Average packs/day: 1  "pack/day for 9.0 years (9.0 ttl pk-yrs)     Types: Cigarettes     Start date: 6/12/2016   Substance and Sexual Activity    Alcohol use: Yes     Comment: socially    Drug use: Not Currently     Frequency: 3.0 times per week     Types: Marijuana    Sexual activity: Never     Vitals:    BP (!) 110/58   Pulse 75   Temp 98 °F (36.7 °C) (Oral)   Resp 20   Ht 5' 3" (1.6 m)   Wt 63.6 kg (140 lb 3.4 oz)   SpO2 97%   BMI 24.84 kg/m²     Physical Exam  Vitals and nursing note reviewed.   Constitutional:       General: He is not in acute distress.     Appearance: Normal appearance. He is not toxic-appearing or diaphoretic.   HENT:      Head: Normocephalic and atraumatic.      Right Ear: External ear normal.      Left Ear: External ear normal.   Eyes:      General: No scleral icterus.     Conjunctiva/sclera: Conjunctivae normal.   Cardiovascular:      Rate and Rhythm: Normal rate and regular rhythm.   Pulmonary:      Effort: Pulmonary effort is normal. No respiratory distress.      Breath sounds: No stridor.   Abdominal:      General: Abdomen is flat. There is no distension.   Musculoskeletal:         General: No swelling.      Cervical back: Normal range of motion and neck supple.   Skin:     General: Skin is dry.      Coloration: Skin is not jaundiced.   Neurological:      Mental Status: He is alert. Mental status is at baseline.   Psychiatric:         Mood and Affect: Mood normal.         Behavior: Behavior normal.       Procedures    Laboratory Studies:  Labs that have been ordered have been independently reviewed and interpreted by myself.  Labs Reviewed   ACETAMINOPHEN LEVEL - Abnormal       Result Value    Acetaminophen Level <3.0 (*)    URINALYSIS, REFLEX TO URINE CULTURE - Abnormal    Color, UA Yellow      Appearance, UA Clear      pH, UA 6.0      Spec Grav UA <=1.005 (*)     Protein, UA Negative      Glucose, UA Negative      Ketones, UA Negative      Bilirubin, UA Negative      Blood, UA Negative      Nitrites, " "UA Negative      Urobilinogen, UA Negative      Leukocyte Esterase, UA Negative     DRUG SCREEN PANEL, URINE EMERGENCY - Abnormal    Benzodiazepine, Urine Negative      Methadone, Urine Negative      Cocaine, Urine Negative      Opiates, Urine Negative      Barbiturates, Urine Negative      Amphetamines, Urine Negative      THC Presumptive Positive (*)     Phencyclidine, Urine Negative      Urine Creatinine 24.8      Narrative:     This screen includes the following classes of drugs at the listed cut-off:     Benzodiazepines:        200 ng/ml   Methadone:              300 ng/ml   Cocaine metabolite:     300 ng/ml   Opiates:                300 ng/ml   Barbiturates:           200 ng/ml   Amphetamines:           1000 ng/ml   Marijuana metabs (THC): 50 ng/ml   Phencyclidine (PCP):    25 ng/ml     This is a screening test. If results do not correlate with clinical presentation, then a confirmatory send out test is advised.    This report is intended for use in clinical monitoring and management of patients. It is not intended for use in employment related drug testing."   SALICYLATE LEVEL - Abnormal    Salicylate Level <5.0 (*)    ALCOHOL,MEDICAL (ETHANOL) - Normal    Alcohol, Serum <10     TSH - Normal    TSH 1.552     TROPONIN I - Normal    Troponin-I <0.006     CK - Normal         GREY TOP URINE HOLD    Extra Tube Hold for add-ons.       Imaging Results    None         EKG (independently interpreted by me): Rhythm:  NSR, rate of  84 BPM, no ST elevations or depressions, QTc 439    I decided to obtain the patient's medical records.  Summary of Medical Records:    Medications   sodium bicarbonate 8.4 % (1 mEq/mL) injection 50 mEq (has no administration in time range)   sodium chloride 0.9% bolus 1,000 mL 1,000 mL (0 mLs Intravenous Stopped 5/31/25 0450)   LORazepam injection 1 mg (1 mg Intravenous Given 5/31/25 6124)     MDM:    25 y.o. male with accidental overdose    Differential Dx:  Differential includes but " is not limited to accidental overdose, polysubstance overdose, doubt intentional overdose    ED Management:  Toxicologic workup started for acute presentation of an emergent condition.  Fluids and Ativan for symptomatic treatment.  On re-evaluation patient is feeling much better.  His EKG is without QRS or QTC prolongation.  Patient has been taking Benadryl throughout all of the yesterday so does not need a prolonged observation period. Signed out to oncoming physician pending obs and discharge    Discussed with:  Poison center regarding patient's symptoms and treatment      Medical Decision Making  Amount and/or Complexity of Data Reviewed  Labs: ordered. Decision-making details documented in ED Course.  ECG/medicine tests: ordered and independent interpretation performed.    Risk  Prescription drug management.         ED Course as of 05/31/25 0708   Sat May 31, 2025   0336 6-8  [AW]   0701 Troponin I: <0.006  Negative [AW]      ED Course User Index  [AW] Lake Richardson MD     Diagnostic Impression:    Final diagnoses:  [T50.901A] Overdose               Lake Richardson MD  05/31/25 0708

## 2025-06-02 ENCOUNTER — HOSPITAL ENCOUNTER (EMERGENCY)
Facility: HOSPITAL | Age: 25
Discharge: HOME | End: 2025-06-02
Payer: MEDICAID

## 2025-06-02 VITALS
BODY MASS INDEX: 24.75 KG/M2 | HEART RATE: 78 BPM | OXYGEN SATURATION: 98 % | RESPIRATION RATE: 16 BRPM | TEMPERATURE: 100 F | HEIGHT: 63 IN | DIASTOLIC BLOOD PRESSURE: 78 MMHG | WEIGHT: 139.69 LBS | SYSTOLIC BLOOD PRESSURE: 115 MMHG

## 2025-06-02 DIAGNOSIS — F41.9 ANXIETY: Primary | ICD-10-CM

## 2025-06-02 DIAGNOSIS — R07.9 CHEST PAIN: ICD-10-CM

## 2025-06-02 LAB
ABSOLUTE EOSINOPHIL (OHS): 0.06 K/UL
ABSOLUTE MONOCYTE (OHS): 0.47 K/UL (ref 0.3–1)
ABSOLUTE NEUTROPHIL COUNT (OHS): 3.55 K/UL (ref 1.8–7.7)
ALBUMIN SERPL BCP-MCNC: 4.3 G/DL (ref 3.5–5.2)
ALP SERPL-CCNC: 64 UNIT/L (ref 40–150)
ALT SERPL W/O P-5'-P-CCNC: 45 UNIT/L (ref 10–44)
AMPHET UR QL SCN: NEGATIVE
ANION GAP (OHS): 11 MMOL/L (ref 8–16)
AST SERPL-CCNC: 46 UNIT/L (ref 11–45)
BARBITURATE SCN PRESENT UR: NEGATIVE
BASOPHILS # BLD AUTO: 0.04 K/UL
BASOPHILS NFR BLD AUTO: 0.6 %
BENZODIAZ UR QL SCN: NEGATIVE
BILIRUB SERPL-MCNC: 2 MG/DL (ref 0.1–1)
BUN SERPL-MCNC: 14 MG/DL (ref 6–20)
CALCIUM SERPL-MCNC: 9.2 MG/DL (ref 8.7–10.5)
CANNABINOIDS UR QL SCN: ABNORMAL
CHLORIDE SERPL-SCNC: 105 MMOL/L (ref 95–110)
CK SERPL-CCNC: 125 U/L (ref 20–200)
CO2 SERPL-SCNC: 19 MMOL/L (ref 23–29)
COCAINE UR QL SCN: NEGATIVE
CREAT SERPL-MCNC: 0.8 MG/DL (ref 0.5–1.4)
CREAT UR-MCNC: 52.2 MG/DL (ref 23–375)
ERYTHROCYTE [DISTWIDTH] IN BLOOD BY AUTOMATED COUNT: 11.9 % (ref 11.5–14.5)
ETHANOL SERPL-MCNC: <10 MG/DL
GFR SERPLBLD CREATININE-BSD FMLA CKD-EPI: >60 ML/MIN/1.73/M2
GLUCOSE SERPL-MCNC: 94 MG/DL (ref 70–110)
HCT VFR BLD AUTO: 43.1 % (ref 40–54)
HGB BLD-MCNC: 15.6 GM/DL (ref 14–18)
IMM GRANULOCYTES # BLD AUTO: 0.01 K/UL (ref 0–0.04)
IMM GRANULOCYTES NFR BLD AUTO: 0.2 % (ref 0–0.5)
LYMPHOCYTES # BLD AUTO: 2.41 K/UL (ref 1–4.8)
MCH RBC QN AUTO: 31.8 PG (ref 27–31)
MCHC RBC AUTO-ENTMCNC: 36.2 G/DL (ref 32–36)
MCV RBC AUTO: 88 FL (ref 82–98)
METHADONE UR QL SCN: NEGATIVE
NUCLEATED RBC (/100WBC) (OHS): 0 /100 WBC
OHS QRS DURATION: 88 MS
OHS QTC CALCULATION: 439 MS
OPIATES UR QL SCN: NEGATIVE
PCP UR QL: NEGATIVE
PLATELET # BLD AUTO: 285 K/UL (ref 150–450)
PMV BLD AUTO: 10.5 FL (ref 9.2–12.9)
POTASSIUM SERPL-SCNC: 4 MMOL/L (ref 3.5–5.1)
PROT SERPL-MCNC: 7.1 GM/DL (ref 6–8.4)
RBC # BLD AUTO: 4.91 M/UL (ref 4.6–6.2)
RELATIVE EOSINOPHIL (OHS): 0.9 %
RELATIVE LYMPHOCYTE (OHS): 36.9 % (ref 18–48)
RELATIVE MONOCYTE (OHS): 7.2 % (ref 4–15)
RELATIVE NEUTROPHIL (OHS): 54.2 % (ref 38–73)
SODIUM SERPL-SCNC: 135 MMOL/L (ref 136–145)
TROPONIN I SERPL DL<=0.01 NG/ML-MCNC: <0.006 NG/ML
WBC # BLD AUTO: 6.54 K/UL (ref 3.9–12.7)

## 2025-06-02 PROCEDURE — 82550 ASSAY OF CK (CPK): CPT | Performed by: NURSE PRACTITIONER

## 2025-06-02 PROCEDURE — 99285 EMERGENCY DEPT VISIT HI MDM: CPT | Mod: 25

## 2025-06-02 PROCEDURE — 85025 COMPLETE CBC W/AUTO DIFF WBC: CPT | Performed by: NURSE PRACTITIONER

## 2025-06-02 PROCEDURE — 25000003 PHARM REV CODE 250: Performed by: NURSE PRACTITIONER

## 2025-06-02 PROCEDURE — 82040 ASSAY OF SERUM ALBUMIN: CPT | Performed by: NURSE PRACTITIONER

## 2025-06-02 PROCEDURE — 93005 ELECTROCARDIOGRAM TRACING: CPT

## 2025-06-02 PROCEDURE — 80307 DRUG TEST PRSMV CHEM ANLYZR: CPT | Performed by: NURSE PRACTITIONER

## 2025-06-02 PROCEDURE — 82077 ASSAY SPEC XCP UR&BREATH IA: CPT | Performed by: NURSE PRACTITIONER

## 2025-06-02 PROCEDURE — 84484 ASSAY OF TROPONIN QUANT: CPT | Performed by: NURSE PRACTITIONER

## 2025-06-02 PROCEDURE — 93010 ELECTROCARDIOGRAM REPORT: CPT | Mod: ,,, | Performed by: INTERNAL MEDICINE

## 2025-06-02 RX ORDER — LORAZEPAM 1 MG/1
1 TABLET ORAL
Status: COMPLETED | OUTPATIENT
Start: 2025-06-02 | End: 2025-06-02

## 2025-06-02 RX ORDER — LORAZEPAM 0.5 MG/1
0.5 TABLET ORAL
Status: COMPLETED | OUTPATIENT
Start: 2025-06-02 | End: 2025-06-02

## 2025-06-02 RX ADMIN — LORAZEPAM 1 MG: 1 TABLET ORAL at 12:06

## 2025-06-02 RX ADMIN — LORAZEPAM 0.5 MG: 0.5 TABLET ORAL at 10:06

## 2025-06-03 ENCOUNTER — OFFICE VISIT (OUTPATIENT)
Dept: FAMILY MEDICINE | Facility: CLINIC | Age: 25
End: 2025-06-03
Payer: MEDICAID

## 2025-06-03 VITALS
SYSTOLIC BLOOD PRESSURE: 120 MMHG | BODY MASS INDEX: 24.55 KG/M2 | OXYGEN SATURATION: 97 % | WEIGHT: 138.56 LBS | DIASTOLIC BLOOD PRESSURE: 80 MMHG | RESPIRATION RATE: 16 BRPM | HEART RATE: 133 BPM | HEIGHT: 63 IN

## 2025-06-03 DIAGNOSIS — F41.1 GAD (GENERALIZED ANXIETY DISORDER): Primary | ICD-10-CM

## 2025-06-03 DIAGNOSIS — G40.909 SEIZURE DISORDER: ICD-10-CM

## 2025-06-03 LAB
OHS QRS DURATION: 82 MS
OHS QTC CALCULATION: 464 MS

## 2025-06-03 PROCEDURE — 3074F SYST BP LT 130 MM HG: CPT | Mod: CPTII,,, | Performed by: STUDENT IN AN ORGANIZED HEALTH CARE EDUCATION/TRAINING PROGRAM

## 2025-06-03 PROCEDURE — 1159F MED LIST DOCD IN RCRD: CPT | Mod: CPTII,,, | Performed by: STUDENT IN AN ORGANIZED HEALTH CARE EDUCATION/TRAINING PROGRAM

## 2025-06-03 PROCEDURE — 3008F BODY MASS INDEX DOCD: CPT | Mod: CPTII,,, | Performed by: STUDENT IN AN ORGANIZED HEALTH CARE EDUCATION/TRAINING PROGRAM

## 2025-06-03 PROCEDURE — 99214 OFFICE O/P EST MOD 30 MIN: CPT | Mod: S$PBB,,, | Performed by: STUDENT IN AN ORGANIZED HEALTH CARE EDUCATION/TRAINING PROGRAM

## 2025-06-03 PROCEDURE — 99999 PR PBB SHADOW E&M-EST. PATIENT-LVL III: CPT | Mod: PBBFAC,,, | Performed by: STUDENT IN AN ORGANIZED HEALTH CARE EDUCATION/TRAINING PROGRAM

## 2025-06-03 PROCEDURE — 3079F DIAST BP 80-89 MM HG: CPT | Mod: CPTII,,, | Performed by: STUDENT IN AN ORGANIZED HEALTH CARE EDUCATION/TRAINING PROGRAM

## 2025-06-03 PROCEDURE — 99213 OFFICE O/P EST LOW 20 MIN: CPT | Mod: PBBFAC | Performed by: STUDENT IN AN ORGANIZED HEALTH CARE EDUCATION/TRAINING PROGRAM

## 2025-06-03 RX ORDER — LEVETIRACETAM 750 MG/1
1500 TABLET, EXTENDED RELEASE ORAL DAILY
Qty: 60 TABLET | Refills: 1 | Status: SHIPPED | OUTPATIENT
Start: 2025-06-03

## 2025-06-03 RX ORDER — SERTRALINE HYDROCHLORIDE 25 MG/1
25 TABLET, FILM COATED ORAL DAILY
Qty: 30 TABLET | Refills: 1 | Status: SHIPPED | OUTPATIENT
Start: 2025-06-03 | End: 2025-08-02

## 2025-06-03 RX ORDER — HYDROXYZINE PAMOATE 50 MG/1
50 CAPSULE ORAL EVERY 6 HOURS PRN
Qty: 60 CAPSULE | Refills: 0 | Status: SHIPPED | OUTPATIENT
Start: 2025-06-03

## 2025-06-06 ENCOUNTER — TELEPHONE (OUTPATIENT)
Dept: FAMILY MEDICINE | Facility: CLINIC | Age: 25
End: 2025-06-06
Payer: MEDICAID

## 2025-06-06 NOTE — TELEPHONE ENCOUNTER
Which one? The as needed med? The other daily med will take time. What is going on? Having panic attacks?

## 2025-06-06 NOTE — TELEPHONE ENCOUNTER
----- Message from Good sent at 6/6/2025 11:54 AM CDT -----  Contact: agustin MiddletonN: [id number]: 2000PCP: Laly UngerHome Phone      [phone] Phone      Not on file.Mobile          [phone]: medication for anxiety nor working -- please advisPricilla Phillip @ [phone]: Cornel Thakur)

## 2025-06-08 ENCOUNTER — HOSPITAL ENCOUNTER (INPATIENT)
Facility: HOSPITAL | Age: 25
LOS: 4 days | Discharge: HOME | DRG: 885 | End: 2025-06-12
Attending: PSYCHIATRY & NEUROLOGY | Admitting: PSYCHIATRY & NEUROLOGY
Payer: MEDICAID

## 2025-06-08 ENCOUNTER — HOSPITAL ENCOUNTER (EMERGENCY)
Facility: HOSPITAL | Age: 25
Discharge: ANOTHER HOSPITAL | End: 2025-06-08
Attending: EMERGENCY MEDICINE
Payer: MEDICAID

## 2025-06-08 VITALS
TEMPERATURE: 99 F | SYSTOLIC BLOOD PRESSURE: 146 MMHG | HEIGHT: 63 IN | DIASTOLIC BLOOD PRESSURE: 80 MMHG | OXYGEN SATURATION: 98 % | RESPIRATION RATE: 16 BRPM | HEART RATE: 90 BPM | WEIGHT: 138.69 LBS | BODY MASS INDEX: 24.57 KG/M2

## 2025-06-08 DIAGNOSIS — T50.901A OVERDOSE: ICD-10-CM

## 2025-06-08 DIAGNOSIS — R45.851 SUICIDAL IDEATION: Primary | ICD-10-CM

## 2025-06-08 DIAGNOSIS — F32.A DEPRESSION: Primary | ICD-10-CM

## 2025-06-08 LAB
ABSOLUTE EOSINOPHIL (OHS): 0.07 K/UL
ABSOLUTE MONOCYTE (OHS): 0.57 K/UL (ref 0.3–1)
ABSOLUTE NEUTROPHIL COUNT (OHS): 3.07 K/UL (ref 1.8–7.7)
ALBUMIN SERPL BCP-MCNC: 4.6 G/DL (ref 3.5–5.2)
ALP SERPL-CCNC: 66 UNIT/L (ref 40–150)
ALT SERPL W/O P-5'-P-CCNC: 33 UNIT/L (ref 10–44)
AMPHET UR QL SCN: NEGATIVE
ANION GAP (OHS): 10 MMOL/L (ref 8–16)
APAP SERPL-MCNC: <3 UG/ML (ref 10–20)
AST SERPL-CCNC: 48 UNIT/L (ref 11–45)
BARBITURATE SCN PRESENT UR: NEGATIVE
BASOPHILS # BLD AUTO: 0.04 K/UL
BASOPHILS NFR BLD AUTO: 0.7 %
BENZODIAZ UR QL SCN: NEGATIVE
BILIRUB SERPL-MCNC: 1 MG/DL (ref 0.1–1)
BILIRUB UR QL STRIP.AUTO: NEGATIVE
BUN SERPL-MCNC: 12 MG/DL (ref 6–20)
CALCIUM SERPL-MCNC: 9.8 MG/DL (ref 8.7–10.5)
CANNABINOIDS UR QL SCN: ABNORMAL
CHLORIDE SERPL-SCNC: 106 MMOL/L (ref 95–110)
CLARITY UR: CLEAR
CO2 SERPL-SCNC: 19 MMOL/L (ref 23–29)
COCAINE UR QL SCN: NEGATIVE
COLOR UR AUTO: COLORLESS
CREAT SERPL-MCNC: 0.8 MG/DL (ref 0.5–1.4)
CREAT UR-MCNC: 5.6 MG/DL (ref 23–375)
ERYTHROCYTE [DISTWIDTH] IN BLOOD BY AUTOMATED COUNT: 11.7 % (ref 11.5–14.5)
ETHANOL SERPL-MCNC: <10 MG/DL
GFR SERPLBLD CREATININE-BSD FMLA CKD-EPI: >60 ML/MIN/1.73/M2
GLUCOSE SERPL-MCNC: 98 MG/DL (ref 70–110)
GLUCOSE UR QL STRIP: NEGATIVE
HCT VFR BLD AUTO: 42.2 % (ref 40–54)
HGB BLD-MCNC: 15.2 GM/DL (ref 14–18)
HGB UR QL STRIP: ABNORMAL
HOLD SPECIMEN: NORMAL
IMM GRANULOCYTES # BLD AUTO: 0.04 K/UL (ref 0–0.04)
IMM GRANULOCYTES NFR BLD AUTO: 0.7 % (ref 0–0.5)
KETONES UR QL STRIP: NEGATIVE
LEUKOCYTE ESTERASE UR QL STRIP: NEGATIVE
LYMPHOCYTES # BLD AUTO: 1.9 K/UL (ref 1–4.8)
MAGNESIUM SERPL-MCNC: 1.5 MG/DL (ref 1.6–2.6)
MCH RBC QN AUTO: 31.7 PG (ref 27–31)
MCHC RBC AUTO-ENTMCNC: 36 G/DL (ref 32–36)
MCV RBC AUTO: 88 FL (ref 82–98)
METHADONE UR QL SCN: NEGATIVE
NITRITE UR QL STRIP: NEGATIVE
NUCLEATED RBC (/100WBC) (OHS): 0 /100 WBC
OPIATES UR QL SCN: NEGATIVE
PCP UR QL: NEGATIVE
PH UR STRIP: 7 [PH]
PLATELET # BLD AUTO: 276 K/UL (ref 150–450)
PMV BLD AUTO: 10.4 FL (ref 9.2–12.9)
POTASSIUM SERPL-SCNC: 3.7 MMOL/L (ref 3.5–5.1)
PROT SERPL-MCNC: 7.8 GM/DL (ref 6–8.4)
PROT UR QL STRIP: NEGATIVE
RBC # BLD AUTO: 4.8 M/UL (ref 4.6–6.2)
RELATIVE EOSINOPHIL (OHS): 1.2 %
RELATIVE LYMPHOCYTE (OHS): 33.4 % (ref 18–48)
RELATIVE MONOCYTE (OHS): 10 % (ref 4–15)
RELATIVE NEUTROPHIL (OHS): 54 % (ref 38–73)
SODIUM SERPL-SCNC: 135 MMOL/L (ref 136–145)
SP GR UR STRIP: <=1.005
UROBILINOGEN UR STRIP-ACNC: NEGATIVE EU/DL
WBC # BLD AUTO: 5.69 K/UL (ref 3.9–12.7)

## 2025-06-08 PROCEDURE — 82077 ASSAY SPEC XCP UR&BREATH IA: CPT | Performed by: EMERGENCY MEDICINE

## 2025-06-08 PROCEDURE — 93010 ELECTROCARDIOGRAM REPORT: CPT | Mod: ,,, | Performed by: INTERNAL MEDICINE

## 2025-06-08 PROCEDURE — 80143 DRUG ASSAY ACETAMINOPHEN: CPT | Performed by: EMERGENCY MEDICINE

## 2025-06-08 PROCEDURE — 63600175 PHARM REV CODE 636 W HCPCS: Performed by: EMERGENCY MEDICINE

## 2025-06-08 PROCEDURE — 96374 THER/PROPH/DIAG INJ IV PUSH: CPT

## 2025-06-08 PROCEDURE — 80053 COMPREHEN METABOLIC PANEL: CPT | Performed by: EMERGENCY MEDICINE

## 2025-06-08 PROCEDURE — 81003 URINALYSIS AUTO W/O SCOPE: CPT | Performed by: EMERGENCY MEDICINE

## 2025-06-08 PROCEDURE — 25000003 PHARM REV CODE 250: Performed by: PSYCHIATRY & NEUROLOGY

## 2025-06-08 PROCEDURE — 99900035 HC TECH TIME PER 15 MIN (STAT)

## 2025-06-08 PROCEDURE — 25000003 PHARM REV CODE 250: Performed by: EMERGENCY MEDICINE

## 2025-06-08 PROCEDURE — 80307 DRUG TEST PRSMV CHEM ANLYZR: CPT | Performed by: EMERGENCY MEDICINE

## 2025-06-08 PROCEDURE — 96361 HYDRATE IV INFUSION ADD-ON: CPT

## 2025-06-08 PROCEDURE — 93005 ELECTROCARDIOGRAM TRACING: CPT

## 2025-06-08 PROCEDURE — 11400000 HC PSYCH PRIVATE ROOM

## 2025-06-08 PROCEDURE — 99285 EMERGENCY DEPT VISIT HI MDM: CPT | Mod: 25

## 2025-06-08 PROCEDURE — 96375 TX/PRO/DX INJ NEW DRUG ADDON: CPT

## 2025-06-08 PROCEDURE — 85025 COMPLETE CBC W/AUTO DIFF WBC: CPT | Performed by: EMERGENCY MEDICINE

## 2025-06-08 PROCEDURE — 83735 ASSAY OF MAGNESIUM: CPT | Performed by: EMERGENCY MEDICINE

## 2025-06-08 PROCEDURE — S4991 NICOTINE PATCH NONLEGEND: HCPCS | Performed by: PSYCHIATRY & NEUROLOGY

## 2025-06-08 RX ORDER — DIAZEPAM 10 MG/1
10 TABLET ORAL ONCE
Status: COMPLETED | OUTPATIENT
Start: 2025-06-08 | End: 2025-06-08

## 2025-06-08 RX ORDER — DIPHENHYDRAMINE HYDROCHLORIDE 50 MG/ML
50 INJECTION, SOLUTION INTRAMUSCULAR; INTRAVENOUS EVERY 4 HOURS PRN
Status: DISCONTINUED | OUTPATIENT
Start: 2025-06-08 | End: 2025-06-09

## 2025-06-08 RX ORDER — ONDANSETRON HYDROCHLORIDE 2 MG/ML
4 INJECTION, SOLUTION INTRAVENOUS
Status: COMPLETED | OUTPATIENT
Start: 2025-06-08 | End: 2025-06-08

## 2025-06-08 RX ORDER — DIAZEPAM 10 MG/1
10 TABLET ORAL 3 TIMES DAILY
Status: DISCONTINUED | OUTPATIENT
Start: 2025-06-09 | End: 2025-06-10

## 2025-06-08 RX ORDER — IBUPROFEN 200 MG
1 TABLET ORAL DAILY PRN
Status: DISCONTINUED | OUTPATIENT
Start: 2025-06-08 | End: 2025-06-09

## 2025-06-08 RX ORDER — CALCIUM CARBONATE 200(500)MG
1000 TABLET,CHEWABLE ORAL EVERY 8 HOURS PRN
Status: DISCONTINUED | OUTPATIENT
Start: 2025-06-08 | End: 2025-06-09

## 2025-06-08 RX ORDER — LORAZEPAM 2 MG/ML
2 INJECTION INTRAMUSCULAR EVERY 4 HOURS PRN
Status: DISCONTINUED | OUTPATIENT
Start: 2025-06-08 | End: 2025-06-09

## 2025-06-08 RX ORDER — LORAZEPAM 1 MG/1
2 TABLET ORAL EVERY 4 HOURS PRN
Status: DISCONTINUED | OUTPATIENT
Start: 2025-06-08 | End: 2025-06-09

## 2025-06-08 RX ORDER — LANOLIN ALCOHOL/MO/W.PET/CERES
400 CREAM (GRAM) TOPICAL ONCE
Status: COMPLETED | OUTPATIENT
Start: 2025-06-08 | End: 2025-06-08

## 2025-06-08 RX ORDER — HALOPERIDOL LACTATE 5 MG/ML
5 INJECTION, SOLUTION INTRAMUSCULAR EVERY 4 HOURS PRN
Status: DISCONTINUED | OUTPATIENT
Start: 2025-06-08 | End: 2025-06-09

## 2025-06-08 RX ORDER — DIPHENHYDRAMINE HCL 50 MG
50 CAPSULE ORAL EVERY 4 HOURS PRN
Status: DISCONTINUED | OUTPATIENT
Start: 2025-06-08 | End: 2025-06-09

## 2025-06-08 RX ORDER — ACETAMINOPHEN 325 MG/1
650 TABLET ORAL EVERY 6 HOURS PRN
Status: DISCONTINUED | OUTPATIENT
Start: 2025-06-08 | End: 2025-06-09

## 2025-06-08 RX ORDER — HALOPERIDOL 5 MG/1
5 TABLET ORAL EVERY 4 HOURS PRN
Status: DISCONTINUED | OUTPATIENT
Start: 2025-06-08 | End: 2025-06-09

## 2025-06-08 RX ORDER — LORAZEPAM 2 MG/ML
1 INJECTION INTRAMUSCULAR
Status: COMPLETED | OUTPATIENT
Start: 2025-06-08 | End: 2025-06-08

## 2025-06-08 RX ORDER — ONDANSETRON 4 MG/1
4 TABLET, ORALLY DISINTEGRATING ORAL EVERY 8 HOURS PRN
Status: DISCONTINUED | OUTPATIENT
Start: 2025-06-08 | End: 2025-06-09

## 2025-06-08 RX ORDER — TALC
6 POWDER (GRAM) TOPICAL NIGHTLY PRN
Status: DISCONTINUED | OUTPATIENT
Start: 2025-06-08 | End: 2025-06-09

## 2025-06-08 RX ADMIN — LEVETIRACETAM 750 MG: 500 TABLET, FILM COATED ORAL at 08:06

## 2025-06-08 RX ADMIN — Medication 400 MG: at 03:06

## 2025-06-08 RX ADMIN — NICOTINE 1 PATCH: 14 PATCH, EXTENDED RELEASE TRANSDERMAL at 05:06

## 2025-06-08 RX ADMIN — Medication 6 MG: at 08:06

## 2025-06-08 RX ADMIN — LORAZEPAM 1 MG: 2 INJECTION INTRAMUSCULAR; INTRAVENOUS at 02:06

## 2025-06-08 RX ADMIN — DIAZEPAM 10 MG: 10 TABLET ORAL at 05:06

## 2025-06-08 RX ADMIN — ONDANSETRON 4 MG: 2 INJECTION INTRAMUSCULAR; INTRAVENOUS at 02:06

## 2025-06-08 RX ADMIN — SODIUM CHLORIDE 1000 ML: 9 INJECTION, SOLUTION INTRAVENOUS at 01:06

## 2025-06-08 NOTE — NURSING
"The patient is dressed in the blue hospital paper scrubs. The patient is anxious, with an elevated mood. He is animated with an inappropriate laughter and child like mannerisms. The patient stutters and has a speech impediment. The patient reported " I had a seizure three weeks ago and I am nervous ever since then. I have been going to the doctor to get a pill to help me but it doesn't work". Patient appears to be developmentally delayed for age and a poor historian. He reported he went to the doctor to get a pill for anxiety. They gave him Hydroxyzine 50 MG and told him to take this medicine "one pill every 6 hours whenever needed "so he took one pill every time he needed for feeling of anxiousness. He reported "I just kept taking a pill when I felt anxious and then I did not have any more pills left so I came back to the ER". This patient did not comprehend the medication was ordered as one pill every six hours and needed. The patient reported "This was not a suicide attempt.  I did not want to kill myself. I just took too much medicine. The doctor said whenever needed so I kept taking the medicine". The patient was very animated and laughing during our interview. He stated "If you are not the  I can tell you I smoke 10-20 joints a day and I drink 12 big bottles of beer a day". The patient has bilateral hand tremors. CIWA score 10.The patient reports his last seizure was three weeks ago. He also stated he was not taking his prescribed medication of Keppra. Educated and stressed the importance of being compliant with his medication and encouraged this patient to refrain from drinking alcohol and smoking marijuana.  The patient reports he was in a special program in high school and he now employed at [employer]. 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 The patient reported "I want help with anxiety". Oriented patient to plan " of care and medication regiment. Patient voiced understanding.

## 2025-06-08 NOTE — NURSING
"Received report from Liliana VEGA on the patient Reed Middleton 25 year old male. Report given this patient came into the ER due to anxiety. He took 60 pills of Vistaril 50 MG tablets over 5 days for anxiety. This patient reported he was not trying to harm himself. See ER Mar for medication administration of Ativan and Zofran. The poison control was called and recommendations were followed. EKG indicated a NSR with a rate of 80"s. Patient was medically cleared and PEC'ed by Dr Dumont. This patient was accepted by Dr. Eden to the [facility].   "

## 2025-06-08 NOTE — ED PROVIDER NOTES
"[facility]                                                  Chief Complaint  25 y.o. male with Psychiatric Evaluation (Pt to er via aasi states increased anxiety, pt hydroxyzine filled 6/3, bottle now empty. Patient states has been taking "like 10" at a time. Reports SI, denies HI. States was not trying to harm self by taking pills. )    History of Present Illness  Reed Middleton presents to the emergency room with concerns for overdose on hydroxyzine.  Patient states that he has been extremely anxious and depressed and has been trying to take hydroxyzine to help with symptoms.  He knows that he is only supposed to take a maximum of 1 every 6 hours however he has been taking around 10-20 every day.  Patient had a bottle filled 5 days ago with 60 tablets of 50 mg hydroxyzine, which is now empty.  Patient states that he was not trying to kill himself but he does have bad thoughts.  He is very tearful and cooperative on arrival.  Reports that he has been committed 1 time in the past and that he really needs to talk to someone to help him.    Past Medical History:   Diagnosis Date    ADHD (attention deficit hyperactivity disorder)     Epilepsy      Past Surgical History:   Procedure Laterality Date    ADENOIDECTOMY      TONSILLECTOMY        Review of patient's allergies indicates:  No Known Allergies     Review of Systems and Physical Exam     Review of Systems  -- Constitution - no fever, no weight loss, no loss of consciousness overdose  -- Eyes - no changes in vision, no redness, no swelling  -- Ear, Nose - no  earache, denies congestion  -- Mouth,Throat - no sore throat, no toothache, normal voice, normal swallowing  -- Respiratory - denies cough and congestion, no shortness of breath, no wheezing  -- Cardiovascular - denies chest pain, no palpitations,   -- Gastrointestinal - denies abdominal pain, denies nausea, vomiting, and diarrhea  -- Genitourinary - no dysuria, no denies flank " "pain, no hematuria or frequency   -- Musculoskeletal - denies back pain, negative for myalgias and arthralgias   -- Neurological - no headache, no neurologic changes, no loss of bladder or bowel function no seizure like activity, no changes in hearing or vision  -- Skin - denies skin changes, no rash, no hives, no suspected skin infection    Vital Signs   height is 5' 3" (1.6 m) and weight is 62.9 kg (138 lb 10.7 oz). His blood pressure is 145/88 (abnormal) and his pulse is 85. His respiration is 16 and oxygen saturation is 100%.      Physical Exam  -- Nursing note and vitals reviewed  -- Constitutional:  Awake alert and oriented, GCS 15, patient is tearful and appears in distress  -- Head: Atraumatic. Normocephalic. No obvious abnormality  -- Eyes: Pupils are equal and reactive to light. Extraocular movements intact. No nystagmus.  No periorbital swelling. Normal conjunctiva.  -- Nose: Nose grossly normal in appearance, nares grossly normal. No rhinorrhea.  -- Throat: Mucous membranes moist, pharynx normal, normal tonsils.  Airway patent.  -- Ears: External ears and TM normal bilaterally. Normal hearing.   -- Neck: Normal range of motion. Neck supple. No meningismus. No adenopathy  -- Cardiac: Normal rate, regular rhythm and normal heart sounds. No carotid bruit. No lower extremity edema.  -- Pulmonary: Normal respiratory effort, breath sounds equal bilaterally. Adequate flow.  No wheezing.  No crackles.  -- Abdominal: Soft, no tenderness, no guarding, no rebound. Normal bowel sounds.   -- Musculoskeletal: Normal range of motion, all 4 extremities 5/5 strength.  Neurovascularly intact. Atraumatic. No deformities.  -- Neurological:  Cranial nerves 2-12 grossly intact. No focal deficits.  Patient has significant tremors  -- Vascular: Posterior tibial, dorsalis pedis and radial pulses 2+ bilaterally    -- Lymphatics: No cervical or peripheral lymphadenopathy.   -- Skin: Warm and dry. No evidence of rash or " cellulitis  -- Psychiatric:  Tearful, Bedside behavior is appropriate.  Patient is cooperative.  Denies suicidal homicidal ideation.    Emergency Room Course     Treatment Course, Evaluation, and Medical Decision Making  1. Physical exam significant for tremors and tearful affect  2. Pec for suspected suicide attempt   3. Zofran for nausea  4. Ativan for tremors  5. Poison control notified recommendations for monitoring EKG and supportive care  6.  Patient feels better, tremors have resolved  7. Patient is medically cleared for U placement     Abnormal lab values  Labs Reviewed   ALCOHOL,MEDICAL (ETHANOL)   CBC W/ AUTO DIFFERENTIAL    Narrative:     The following orders were created for panel order CBC auto differential.  Procedure                               Abnormality         Status                     ---------                               -----------         ------                     CBC with Differential[[id number]]                                                        Please view results for these tests on the individual orders.   COMPREHENSIVE METABOLIC PANEL   URINALYSIS, REFLEX TO URINE CULTURE   DRUG SCREEN PANEL, URINE EMERGENCY   ALCOHOL,MEDICAL (ETHANOL)   ACETAMINOPHEN LEVEL   CBC WITH DIFFERENTIAL   MAGNESIUM       Medications Given  Medications   LORazepam injection 1 mg (has no administration in time range)   ondansetron injection 4 mg (has no administration in time range)   sodium chloride 0.9% bolus 1,000 mL 1,000 mL (has no administration in time range)         Diagnosis  -- overdose, nonlethal  --suicidal ideation    Disposition and Plan  -- Disposition:  [facility], [place]-- Condition: stable         Ines Dumont MD  06/08/25 154       Ines Dumont MD  06/08/25 0517

## 2025-06-09 LAB
CHOLEST SERPL-MCNC: 152 MG/DL (ref 120–199)
CHOLEST/HDLC SERPL: 2.5 {RATIO} (ref 2–5)
EAG (OHS): 97 MG/DL (ref 68–131)
HBA1C MFR BLD: 5 % (ref 4–5.6)
HDLC SERPL-MCNC: 61 MG/DL (ref 40–75)
HDLC SERPL: 40.1 % (ref 20–50)
LDLC SERPL CALC-MCNC: 49 MG/DL (ref 63–159)
NONHDLC SERPL-MCNC: 91 MG/DL
OHS QRS DURATION: 98 MS
OHS QTC CALCULATION: 465 MS
TRIGL SERPL-MCNC: 210 MG/DL (ref 30–150)

## 2025-06-09 PROCEDURE — S4991 NICOTINE PATCH NONLEGEND: HCPCS | Performed by: PSYCHIATRY & NEUROLOGY

## 2025-06-09 PROCEDURE — 25000003 PHARM REV CODE 250: Performed by: PSYCHIATRY & NEUROLOGY

## 2025-06-09 PROCEDURE — 80061 LIPID PANEL: CPT | Performed by: PSYCHIATRY & NEUROLOGY

## 2025-06-09 PROCEDURE — 83036 HEMOGLOBIN GLYCOSYLATED A1C: CPT | Performed by: PSYCHIATRY & NEUROLOGY

## 2025-06-09 PROCEDURE — 25000003 PHARM REV CODE 250: Performed by: STUDENT IN AN ORGANIZED HEALTH CARE EDUCATION/TRAINING PROGRAM

## 2025-06-09 PROCEDURE — 36415 COLL VENOUS BLD VENIPUNCTURE: CPT | Performed by: PSYCHIATRY & NEUROLOGY

## 2025-06-09 PROCEDURE — 11400000 HC PSYCH PRIVATE ROOM

## 2025-06-09 PROCEDURE — 99223 1ST HOSP IP/OBS HIGH 75: CPT | Mod: ,,, | Performed by: STUDENT IN AN ORGANIZED HEALTH CARE EDUCATION/TRAINING PROGRAM

## 2025-06-09 RX ORDER — BENZTROPINE MESYLATE 1 MG/ML
2 INJECTION, SOLUTION INTRAMUSCULAR; INTRAVENOUS EVERY 8 HOURS PRN
Status: DISCONTINUED | OUTPATIENT
Start: 2025-06-09 | End: 2025-06-12 | Stop reason: HOSPADM

## 2025-06-09 RX ORDER — BENZONATATE 100 MG/1
100 CAPSULE ORAL 3 TIMES DAILY PRN
Status: DISCONTINUED | OUTPATIENT
Start: 2025-06-09 | End: 2025-06-12 | Stop reason: HOSPADM

## 2025-06-09 RX ORDER — LORAZEPAM 1 MG/1
1 TABLET ORAL EVERY 6 HOURS PRN
Status: DISCONTINUED | OUTPATIENT
Start: 2025-06-09 | End: 2025-06-12 | Stop reason: HOSPADM

## 2025-06-09 RX ORDER — ONDANSETRON 4 MG/1
4 TABLET, ORALLY DISINTEGRATING ORAL EVERY 8 HOURS PRN
Status: DISCONTINUED | OUTPATIENT
Start: 2025-06-09 | End: 2025-06-12 | Stop reason: HOSPADM

## 2025-06-09 RX ORDER — HYDROXYZINE PAMOATE 50 MG/1
50 CAPSULE ORAL EVERY 6 HOURS PRN
Status: DISCONTINUED | OUTPATIENT
Start: 2025-06-09 | End: 2025-06-12 | Stop reason: HOSPADM

## 2025-06-09 RX ORDER — OLANZAPINE 10 MG/2ML
10 INJECTION, POWDER, FOR SOLUTION INTRAMUSCULAR EVERY 8 HOURS PRN
Status: DISCONTINUED | OUTPATIENT
Start: 2025-06-09 | End: 2025-06-12 | Stop reason: HOSPADM

## 2025-06-09 RX ORDER — SERTRALINE HYDROCHLORIDE 50 MG/1
50 TABLET, FILM COATED ORAL DAILY
Status: DISCONTINUED | OUTPATIENT
Start: 2025-06-09 | End: 2025-06-12 | Stop reason: HOSPADM

## 2025-06-09 RX ORDER — ALUMINUM HYDROXIDE, MAGNESIUM HYDROXIDE, AND SIMETHICONE 1200; 120; 1200 MG/30ML; MG/30ML; MG/30ML
30 SUSPENSION ORAL EVERY 6 HOURS PRN
Status: DISCONTINUED | OUTPATIENT
Start: 2025-06-09 | End: 2025-06-12 | Stop reason: HOSPADM

## 2025-06-09 RX ORDER — LORAZEPAM 2 MG/ML
2 INJECTION INTRAMUSCULAR ONCE AS NEEDED
Status: DISCONTINUED | OUTPATIENT
Start: 2025-06-09 | End: 2025-06-12 | Stop reason: HOSPADM

## 2025-06-09 RX ORDER — LOPERAMIDE HYDROCHLORIDE 2 MG/1
2 CAPSULE ORAL
Status: DISCONTINUED | OUTPATIENT
Start: 2025-06-09 | End: 2025-06-12 | Stop reason: HOSPADM

## 2025-06-09 RX ORDER — PROMETHAZINE HYDROCHLORIDE 25 MG/1
25 TABLET ORAL EVERY 6 HOURS PRN
Status: DISCONTINUED | OUTPATIENT
Start: 2025-06-09 | End: 2025-06-12 | Stop reason: HOSPADM

## 2025-06-09 RX ORDER — IBUPROFEN 200 MG
1 TABLET ORAL DAILY PRN
Status: DISCONTINUED | OUTPATIENT
Start: 2025-06-09 | End: 2025-06-12 | Stop reason: HOSPADM

## 2025-06-09 RX ORDER — QUETIAPINE FUMARATE 25 MG/1
50 TABLET, FILM COATED ORAL NIGHTLY
Status: DISCONTINUED | OUTPATIENT
Start: 2025-06-09 | End: 2025-06-09

## 2025-06-09 RX ORDER — ACETAMINOPHEN 325 MG/1
650 TABLET ORAL EVERY 6 HOURS PRN
Status: DISCONTINUED | OUTPATIENT
Start: 2025-06-09 | End: 2025-06-12 | Stop reason: HOSPADM

## 2025-06-09 RX ORDER — OLANZAPINE 10 MG/1
10 TABLET, FILM COATED ORAL EVERY 8 HOURS PRN
Status: DISCONTINUED | OUTPATIENT
Start: 2025-06-09 | End: 2025-06-12 | Stop reason: HOSPADM

## 2025-06-09 RX ADMIN — DIAZEPAM 10 MG: 10 TABLET ORAL at 02:06

## 2025-06-09 RX ADMIN — DIAZEPAM 10 MG: 10 TABLET ORAL at 08:06

## 2025-06-09 RX ADMIN — SERTRALINE HYDROCHLORIDE 50 MG: 50 TABLET ORAL at 01:06

## 2025-06-09 RX ADMIN — HYDROXYZINE PAMOATE 50 MG: 50 CAPSULE ORAL at 11:06

## 2025-06-09 RX ADMIN — LEVETIRACETAM 750 MG: 500 TABLET, FILM COATED ORAL at 09:06

## 2025-06-09 RX ADMIN — LEVETIRACETAM 750 MG: 500 TABLET, FILM COATED ORAL at 08:06

## 2025-06-09 RX ADMIN — NICOTINE 1 PATCH: 14 PATCH, EXTENDED RELEASE TRANSDERMAL at 08:06

## 2025-06-09 NOTE — PSYCH
"Meditation/Education:  Processing Anger : Meditation Exercise  Patient Response:  "Patient attended session               "

## 2025-06-09 NOTE — PLAN OF CARE
Plan of care reviewed. Denies intent to harm self or others at this time. Denies hallucinations and delusions. Accepts snacks and medications. Gait steady, no falls. Interacting with staff and peers. Patient states he feels a bit sad but is otherwise okay. He states he will move in with his father and his stepmother. He also plans to control his THC and ETOH use. Patient states he is looking forward to discharge so he can go back to work. Will continue precautions and monitor for safety.

## 2025-06-09 NOTE — PLAN OF CARE
Problem: Adult Behavioral Health Plan of Care  Goal: Optimized Coping Skills in Response to Life Stressors  Outcome: Progressing  Intervention: Promote Effective Coping Strategies  Flowsheets (Taken 6/9/2025 6130)  Supportive Measures:   active listening utilized   counseling provided   self-care encouraged  PROCESS GROUP:      Pt did not attend group today. PLPC met with pt individually.  PLPC attempted to  discuss with pt on group discussion. Pt was resting in bed quietly with covers over body. Pt reports a sleepy mood. PLPC discussed with pt PLPC will come back at a later time and date to discuss group.

## 2025-06-09 NOTE — PLAN OF CARE
Walking in halls, appropriate eye contact, denies SI/HI, anxiety, depression and A/V  hallucinations, calm/cooperative, verbal contract for safety, plan of care ongoing, accepts snacks without difficulty. Medications reviewed. Encouraged to participate in groups. Monitored Q15 minutes for falls and safety precautions per staff. Will continue to monitor for needs and safety.
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
## 2025-06-09 NOTE — PROGRESS NOTES
"   06/09/25 1015   [facility] Group Therapy   Group Name Other   Participation Level None     Despite encouragement, patient isolating in assigned room, resting in bed and decline to participate. Patient reports a tired & sleepy" mood.   "

## 2025-06-09 NOTE — HPI
Patient is a 25 year old male with medical history of seizure and depression was admitted to [facility] for suicide attempt.  Hospital medicine consulted for medical management.

## 2025-06-10 PROCEDURE — 90833 PSYTX W PT W E/M 30 MIN: CPT | Mod: ,,, | Performed by: STUDENT IN AN ORGANIZED HEALTH CARE EDUCATION/TRAINING PROGRAM

## 2025-06-10 PROCEDURE — 11400000 HC PSYCH PRIVATE ROOM

## 2025-06-10 PROCEDURE — 99231 SBSQ HOSP IP/OBS SF/LOW 25: CPT | Mod: SA,HB,, | Performed by: PHYSICIAN ASSISTANT

## 2025-06-10 PROCEDURE — 25000003 PHARM REV CODE 250: Performed by: STUDENT IN AN ORGANIZED HEALTH CARE EDUCATION/TRAINING PROGRAM

## 2025-06-10 PROCEDURE — S4991 NICOTINE PATCH NONLEGEND: HCPCS | Performed by: PSYCHIATRY & NEUROLOGY

## 2025-06-10 PROCEDURE — 25000003 PHARM REV CODE 250: Performed by: PSYCHIATRY & NEUROLOGY

## 2025-06-10 PROCEDURE — 99232 SBSQ HOSP IP/OBS MODERATE 35: CPT | Mod: ,,, | Performed by: STUDENT IN AN ORGANIZED HEALTH CARE EDUCATION/TRAINING PROGRAM

## 2025-06-10 RX ORDER — DIAZEPAM 10 MG/1
10 TABLET ORAL 2 TIMES DAILY
Status: DISCONTINUED | OUTPATIENT
Start: 2025-06-10 | End: 2025-06-12 | Stop reason: HOSPADM

## 2025-06-10 RX ADMIN — LEVETIRACETAM 750 MG: 500 TABLET, FILM COATED ORAL at 07:06

## 2025-06-10 RX ADMIN — LEVETIRACETAM 750 MG: 500 TABLET, FILM COATED ORAL at 09:06

## 2025-06-10 RX ADMIN — NICOTINE 1 PATCH: 14 PATCH, EXTENDED RELEASE TRANSDERMAL at 07:06

## 2025-06-10 RX ADMIN — SERTRALINE HYDROCHLORIDE 50 MG: 50 TABLET ORAL at 07:06

## 2025-06-10 RX ADMIN — DIAZEPAM 10 MG: 10 TABLET ORAL at 07:06

## 2025-06-10 RX ADMIN — HYDROXYZINE PAMOATE 50 MG: 50 CAPSULE ORAL at 08:06

## 2025-06-10 RX ADMIN — DIAZEPAM 10 MG: 10 TABLET ORAL at 08:06

## 2025-06-10 NOTE — PROGRESS NOTES
"   06/10/25 1000   [facility] Group Therapy   Group Name Leisure Education   Specific Interventions Leisure Counseling  (increase awareness of different types of activities in different settings)   Participation Level Appropriate;Attentive;Sharing   Participation Quality Social;Cooperative   Insight/Motivation Applies New Skills;Good   Affect/Mood Display Appropriate   Cognition Alert   Psychomotor WNL     Patient presents calm, cooperative, reports a "great" mood, "I feel a lot better." Patient reports his goal is "leaving and going back to work." Patient reports he doesn't have much free time besides sleeping, showering and working." Patient verbalized "drinking and overdosing on anxiety medicine was the worse mistake in my life." Patient identified coping skills of praying and sleeping to relax and feel peace and activities he enjoys with family are fishing and watching TV.  "

## 2025-06-10 NOTE — PLAN OF CARE
Patient awake and alert. Out of room for meals and snacks. Hyper-verbal, excitable, inappropriate laughing. States he is fine and only needed a change in his seizure medications. States he checked him self in to seek help with his medications. Denies SI/HI/Hallucinations. Medications reviewed and accepted without difficulty. Plan of care ongoing.

## 2025-06-10 NOTE — SUBJECTIVE & OBJECTIVE
Past Medical History:   Diagnosis Date    ADHD (attention deficit hyperactivity disorder)     Epilepsy        Past Surgical History:   Procedure Laterality Date    ADENOIDECTOMY      TONSILLECTOMY         Review of patient's allergies indicates:  No Known Allergies    No current facility-administered medications on file prior to encounter.     Current Outpatient Medications on File Prior to Encounter   Medication Sig    hydrOXYzine pamoate (VISTARIL) 50 MG Cap Take 1 capsule (50 mg total) by mouth every 6 (six) hours as needed (Anxiety).    levetiracetam XR (KEPPRA XR) 750 mg Tb24 tablet Take 2 tablets (1,500 mg total) by mouth once daily.    sertraline (ZOLOFT) 25 MG tablet Take 1 tablet (25 mg total) by mouth once daily.     Family History       Problem Relation (Age of Onset)    Heart disease Father    Hypertension Father          Tobacco Use    Smoking status: Every Day     Current packs/day: 1.00     Average packs/day: 1 pack/day for 9.0 years (9.0 ttl pk-yrs)     Types: Cigarettes     Start date: 6/12/2016    Smokeless tobacco: Not on file   Substance and Sexual Activity    Alcohol use: Yes     Alcohol/week: 12.0 standard drinks of alcohol     Types: 12 Cans of beer per week    Drug use: Not Currently     Frequency: 3.0 times per week     Types: Marijuana    Sexual activity: Never     Review of Systems   Constitutional:  Negative for chills and fever.   Respiratory:  Negative for cough, shortness of breath and wheezing.    Cardiovascular:  Negative for chest pain and palpitations.   Gastrointestinal:  Negative for abdominal distention, constipation, diarrhea, nausea and vomiting.   Genitourinary:  Negative for difficulty urinating and dysuria.     Objective:     Vital Signs (Most Recent):  Temp: 97.7 °F (36.5 °C) (06/10/25 0718)  Pulse: (!) 56 (06/10/25 0718)  Resp: 18 (06/10/25 0718)  BP: 118/69 (06/10/25 0718)  SpO2: 98 % (06/10/25 0718) Vital Signs (24h Range):  Temp:  [97.7 °F (36.5 °C)-97.8 °F (36.6 °C)]  97.7 °F (36.5 °C)  Pulse:  [56-59] 56  Resp:  [18-20] 18  SpO2:  [98 %-99 %] 98 %  BP: (118-139)/(69-80) 118/69     Weight: 62.1 kg (136 lb 12.7 oz)  Body mass index is 24.23 kg/m².     Physical Exam  Constitutional:       Appearance: Normal appearance.   HENT:      Head: Normocephalic and atraumatic.   Cardiovascular:      Rate and Rhythm: Normal rate and regular rhythm.   Pulmonary:      Effort: Pulmonary effort is normal.      Breath sounds: Normal breath sounds.   Abdominal:      General: There is no distension.      Tenderness: There is no abdominal tenderness.   Musculoskeletal:         General: No swelling or tenderness.      Cervical back: Neck supple.      Right lower leg: No edema.      Left lower leg: No edema.   Skin:     General: Skin is warm and dry.   Neurological:      Mental Status: He is alert.      Cranial Nerves: Cranial nerves 2-12 are intact.      Comments: CN II: Visual Fields full to confrontation  CN III, IV , VI PERRL   CN III: no palsy   Nystagmus: none  Diplopia: none  Ophthalmoparesis: none  CN V Facial sensation intact  CN VII facial expression full, symmetric  CN VIII normal  CN IX normal  CN X normal  CN XI normal  CN XII normal                 Significant Labs: UPT  No results found for this or any previous visit.  U/A  Negative for UTI   UDS  Results for orders placed or performed during the hospital encounter of 06/08/25   Drug screen panel, emergency    Collection Time: 06/08/25  1:41 PM   Result Value Ref Range    Benzodiazepine, Urine Negative Negative    Methadone, Urine Negative Negative    Cocaine, Urine Negative Negative    Opiates, Urine Negative Negative    Barbiturates, Urine Negative Negative    Amphetamines, Urine Negative Negative    THC Presumptive Positive (A) Negative    Phencyclidine, Urine Negative Negative    Urine Creatinine 5.6 (L) 23.0 - 375.0 mg/dL     CBC  Results for orders placed or performed during the hospital encounter of 06/08/25   CBC with  Differential    Collection Time: 06/08/25  1:57 PM   Result Value Ref Range    WBC 5.69 3.90 - 12.70 K/uL    RBC 4.80 4.60 - 6.20 M/uL    HGB 15.2 14.0 - 18.0 gm/dL    HCT 42.2 40.0 - 54.0 %    MCV 88 82 - 98 fL    MCH 31.7 (H) 27.0 - 31.0 pg    MCHC 36.0 32.0 - 36.0 g/dL    RDW 11.7 11.5 - 14.5 %    Platelet Count 276 150 - 450 K/uL    MPV 10.4 9.2 - 12.9 fL    Nucleated RBC 0 <=0 /100 WBC    Neut % 54.0 38 - 73 %    Lymph % 33.4 18 - 48 %    Mono % 10.0 4 - 15 %    Eos % 1.2 <=8 %    Basophil % 0.7 <=1.9 %    Imm Grans % 0.7 (H) 0.0 - 0.5 %    Neut # 3.07 1.8 - 7.7 K/uL    Lymph # 1.90 1 - 4.8 K/uL    Mono # 0.57 0.3 - 1 K/uL    Eos # 0.07 <=0.5 K/uL    Baso # 0.04 <=0.2 K/uL    Imm Grans # 0.04 0.00 - 0.04 K/uL   Results for orders placed or performed during the hospital encounter of 07/18/21   CBC Auto Differential    Collection Time: 07/18/21 11:04 AM   Result Value Ref Range    WBC 4.64 3.90 - 12.70 K/uL    RBC 4.87 4.60 - 6.20 M/uL    Hemoglobin 15.5 14.0 - 18.0 g/dL    Hematocrit 45.2 40.0 - 54.0 %    MCV 93 82 - 98 fL    MCH 31.8 (H) 27.0 - 31.0 pg    MCHC 34.3 32.0 - 36.0 g/dL    RDW 12.0 11.5 - 14.5 %    Platelets 234 150 - 450 K/uL    MPV 11.9 9.2 - 12.9 fL    Immature Granulocytes 0.2 0.0 - 0.5 %    Gran # (ANC) 2.3 1.8 - 7.7 K/uL    Immature Grans (Abs) 0.01 0.00 - 0.04 K/uL    Lymph # 1.9 1.0 - 4.8 K/uL    Mono # 0.4 0.3 - 1.0 K/uL    Eos # 0.0 0.0 - 0.5 K/uL    Baso # 0.02 0.00 - 0.20 K/uL    nRBC 0 0 /100 WBC    Gran % 48.7 38.0 - 73.0 %    Lymph % 41.8 18.0 - 48.0 %    Mono % 8.0 4.0 - 15.0 %    Eosinophil % 0.9 0.0 - 8.0 %    Basophil % 0.4 0.0 - 1.9 %    Differential Method Automated      CMP  Results for orders placed or performed during the hospital encounter of 06/08/25   Comprehensive metabolic panel    Collection Time: 06/08/25  1:57 PM   Result Value Ref Range    Sodium 135 (L) 136 - 145 mmol/L    Potassium 3.7 3.5 - 5.1 mmol/L    Chloride 106 95 - 110 mmol/L    CO2 19 (L) 23 - 29 mmol/L     Glucose 98 70 - 110 mg/dL    BUN 12 6 - 20 mg/dL    Creatinine 0.8 0.5 - 1.4 mg/dL    Calcium 9.8 8.7 - 10.5 mg/dL    Protein Total 7.8 6.0 - 8.4 gm/dL    Albumin 4.6 3.5 - 5.2 g/dL    Bilirubin Total 1.0 0.1 - 1.0 mg/dL    ALP 66 40 - 150 unit/L    AST 48 (H) 11 - 45 unit/L    ALT 33 10 - 44 unit/L    Anion Gap 10 8 - 16 mmol/L    eGFR >60 >60 mL/min/1.73/m2     TSH  Results for orders placed or performed during the hospital encounter of 05/31/25   TSH    Collection Time: 05/31/25  3:44 AM   Result Value Ref Range    TSH 1.552 0.400 - 4.000 uIU/mL     ETOH  Results for orders placed or performed during the hospital encounter of 06/08/25   Ethanol    Collection Time: 06/08/25  1:57 PM   Result Value Ref Range    Alcohol, Serum <10 <10 mg/dL     Salicylate  Results for orders placed or performed during the hospital encounter of 05/31/25   Salicylate Level    Collection Time: 05/31/25  3:44 AM   Result Value Ref Range    Salicylate Level <5.0 (L) 15.0 - 30.0 mg/dL     Acetaminophen  Results for orders placed or performed during the hospital encounter of 06/08/25   Acetaminophen level    Collection Time: 06/08/25  1:57 PM   Result Value Ref Range    Acetaminophen Level <3.0 (L) 10.0 - 20.0 ug/ml             Significant Imaging: none

## 2025-06-10 NOTE — CONSULTS
[facility]  Adult Nutrition  Consult Note    SUMMARY     Recommendations    1. Continue on regular oral diet    2. Recommend commercial beverages: Boost TID    3. Monitor labs and weights    Goals: Patient to consume >75% of EEN prior to RD follow up  Nutrition Goal Status: new  Communication of RD Recs: other (comment) (Consult, poc)    Nutrition Discharge Planning     Nutrition Discharge Planning: General healthy diet    Assessment and Plan    Nutrition Problem  Inadequate energy intake     Related to (etiology):   Psychological causes    Signs and Symptoms (as evidenced by):   Estimated intake of food less than estimated needs    Interventions (treatment strategy):  General healthful diet   Commercial beverage medical food supplement therapy     Nutrition Diagnosis Status:   New         Malnutrition Assessment             Patient does not meet positive criteria of NFPE at this time.  RD to continue to monitor for nutrition risks at follow up visits.                               Reason for Assessment    Reason For Assessment: consult  Diagnosis: psychological disorder  Patient Active Problem List   Diagnosis    Epilepsy seizure, generalized, convulsive    Non-intractable vomiting with nausea    Seizure disorder    Chronic constipation     Past Medical History:   Diagnosis Date    ADHD (attention deficit hyperactivity disorder)     Epilepsy        General Information Comments:   6/10/25: Patient is on a regular diet with varying po intake documented between 25-75%, 100% of snacks.  No GI tolerance issues reported.  Patient admitted with substance abuse and thoughts of suicide/death. Labs reviewed.  NKFA.  LBM: LANDEN.  RD to continue to monitor po intake and tolerance.    Nutrition/Diet History    Nutrition Related Social Determinants of Health: SDOH: Unable to assess at this time.     Spiritual, Cultural Beliefs, Adventism Practices, Values that Affect Care: no  Food Allergies: NKFA  Factors  "Affecting Nutritional Intake: depression    Anthropometrics    Height: 5' 3" (160 cm)  Height (inches): 63 in  Height Method: Measured  Weight: 62.1 kg (136 lb 12.7 oz)  Weight (lb): 136.8 lb  Weight Method: Standard Scale  Ideal Body Weight (IBW), Male: 124 lb  % Ideal Body Weight, Male (lb): 110.32 %  BMI (Calculated): 24.2  BMI Grade: 18.5-24.9 - normal       Lab/Procedures/Meds    Pertinent Labs Reviewed: reviewed  BMP  Lab Results   Component Value Date     (L) 06/08/2025    K 3.7 06/08/2025     06/08/2025    CO2 19 (L) 06/08/2025    BUN 12 06/08/2025    CREATININE 0.8 06/08/2025    CALCIUM 9.8 06/08/2025    ANIONGAP 10 06/08/2025    EGFRNORACEVR >60 06/08/2025     Lab Results   Component Value Date    HGBA1C 5.0 06/09/2025     Lab Results   Component Value Date    CALCIUM 9.8 06/08/2025    PHOS 4.4 12/13/2018       Pertinent Medications Reviewed: reviewed  Scheduled Meds:   diazePAM  10 mg Oral BID    levETIRAcetam  750 mg Oral BID    sertraline  50 mg Oral Daily     Continuous Infusions:  PRN Meds:.  Current Facility-Administered Medications:     acetaminophen, 650 mg, Oral, Q6H PRN    aluminum-magnesium hydroxide-simethicone, 30 mL, Oral, Q6H PRN    benzonatate, 100 mg, Oral, TID PRN    benztropine mesylate, 2 mg, Intramuscular, Q8H PRN    hydrOXYzine pamoate, 50 mg, Oral, Q6H PRN    loperamide, 2 mg, Oral, PRN    lorazepam, 2 mg, Intramuscular, Once PRN    LORazepam, 1 mg, Oral, Q6H PRN    nicotine, 1 patch, Transdermal, Daily PRN    OLANZapine, 10 mg, Oral, Q8H PRN **AND** OLANZapine, 10 mg, Intramuscular, Q8H PRN    ondansetron, 4 mg, Oral, Q8H PRN    promethazine, 25 mg, Oral, Q6H PRN    Estimated/Assessed Needs    Weight Used For Calorie Calculations: 62.1 kg (136 lb 14.5 oz)  Energy Calorie Requirements (kcal): 25-30kcals/kg (1552-1863kcals/day)  Energy Need Method: Kcal/kg  Protein Requirements: 1.2g/kg (75g/day)  Weight Used For Protein Calculations: 62.1 kg (136 lb 14.5 oz)  Fluid " Requirements (mL): 1ml/kcal  Estimated Fluid Requirement Method: RDA Method  RDA Method (mL): 25  CHO Requirement: 225-250      Nutrition Prescription Ordered    Current Diet Order: Regular  Oral Nutrition Supplement: Boost    Evaluation of Received Nutrient/Fluid Intake    % Kcal Needs: 25-75%  % Protein Needs: 25-75%  Energy Calories Required: not meeting needs  Protein Required: not meeting needs  Fluid Required: not meeting needs  Comments: LBM:LANDEN  Tolerance: tolerating  % Intake of Estimated Energy Needs: Other: 25-75%  % Meal Intake: Other: 25-75%    PES Statement  Inadequate energy intake related to Chronic illness as evidenced by Intake <75% estimated needs  Status: New    Nutrition Risk    Level of Risk/Frequency of Follow-up: moderate (Follow up: 1-2x per week)       Monitor and Evaluation    Monitor and Evaluation: Energy intake, Protein intake, Diet order, Weight, Beliefs and attitudes, Electrolyte and renal panel, Gastrointestinal profile, Glucose/endocrine profile, Inflammatory profile, Lipid profile       Nutrition Follow-Up    RD Follow-up?: Yes  Rhonda España, MS, RDN, LDN

## 2025-06-10 NOTE — PROGRESS NOTES
"   06/10/25 1112   Assessment   Patient's Identification of the Problem Patient presents calm, cooperative, reports a "great" mood. Patient states his admit is due to "I felt like I was going to die. I couldn't stop the anxiety." Patient states "I overdose on anxiety med's. I was drinking and smoking marijuana. I think that"s what intensified the anxiety. I was just partying. It was a accident... I have bukus of money and I just like to party." Patient admits to negative leisure lifestyle of cigarettes, alcohol and marijuana. Patient reports he is single, does not have children, is a HSG, is employed with a security company, is hard of hearing(20% loss in one ear & 80% loss in the other ear),, lives in [place] with his parents and grandmother. Patient verbalized his main goal, "I just know all I need to do is stop drinking.   Leisure Interest Watching TV;Listen to Music;Sit Outside;Fishing;Hunting;Sports   Leisure Barriers Loss of Interest;ETOH Use;Energy Level;Self Confidence;Drug Use  (don't drive due to seizures)   Treatment Focus To Improve Mood;To Increase Motivation;Increase Self Confidence;To Improve Leisure Awareness/Lifestyle/Interest;To Promote Successful and Safe Self Expression;To Improve Coping Skills;To Increase Energy Level;To Educate and Increase Awareness of Sober Free Activities       Treatment Recommendation:   1:1 Intervention (as needed)    Cognitive Stimulation Skilled Activity  Self Expression Skilled Activity  Mild Exercises Skilled Activity  Coping Skilled Activity  Leisure Education and Awareness Skilled Activity    Treatment Goal(s):  Long Term Goals Refer To Master Treatment Plan    Short Term Treatment Goal(s)  Patient Will:  Comply with Treatment  Exhibit Improvement in Mood  Demonstrate Constructive Expression of Feelings and Behavior  Verbalize Improvement in Mood  Identify (+) Leisure / Recreation Activities that Promote Wellness and Promote a Sober Lifestyle    Discharge " Recommendations:  Encourage Patient to Actively Utilize Available Community Resources to Increase Leisure Involvement to Decrease Signs and Symptoms of Illness  Encourage Patient to Utilize Coping Skills on a Regular Basis to Reduce the Risk of Decompensating and Re-Hospitalizations  Support Group  Follow Up with After Care Appointments

## 2025-06-10 NOTE — PSYCH
"Meditation/Education:  [place] [place] : Meditation Exercise  Patient Response:  "Patient attended session               "

## 2025-06-10 NOTE — PLAN OF CARE
Plan of care reviewed. Denies intent to harm self or others at this time. States he is depressed because he was not released today. States he has to get back to his job. Patient received a visit from his stepmother earlier. He is now anxious because he states he has been fired from his job. He refuses to accept any explanation about being excused from work due to being hospitalized. He continues to say he knows his boss has fired him. Gait steady, no falls. He is restless and anxious about his job. Will continue precautions and monitor for safety.

## 2025-06-10 NOTE — PLAN OF CARE
Nutrition Plan of Care:    Recommendations     1. Continue on regular oral diet    2. Recommend commercial beverages: Boost TID    3. Monitor labs and weights     Goals: Patient to consume >75% of EEN prior to RD follow up  Nutrition Goal Status: new  Communication of RD Recs: other (comment) (Consult, poc)     Nutrition Discharge Planning      Nutrition Discharge Planning: General healthy diet     Assessment and Plan     Nutrition Problem  Inadequate energy intake      Related to (etiology):   Psychological causes     Signs and Symptoms (as evidenced by):   Estimated intake of food less than estimated needs     Interventions (treatment strategy):  General healthful diet   Commercial beverage medical food supplement therapy      Nutrition Diagnosis Status:   Ramon España MS, RDN, LDN

## 2025-06-10 NOTE — NURSING
Pt is sleeping at this time and has slept 5 hrs. Resp even & unlabored. Pathways clear. Q 15 minute safety checks ongoing. All precautions maintained.

## 2025-06-10 NOTE — CONSULTS
[facility] Medicine  Consult Note    Patient Name: Reed Middleton  MRN: [id number]  Admission Date: 6/8/2025  Hospital Length of Stay: 2 days  Attending Physician: Varun Eden MD   Primary Care Provider: Laly Unger MD           Patient information was obtained from patient and ER records.     Inpatient consult to [facility] for History and Physical  Consult performed by: Mitra Farfan PA-C  Consult ordered by: Varun Eden MD        Subjective:     Principal Problem: <principal problem not specified>    Chief Complaint: No chief complaint on file.       HPI: Patient is a 25 year old male with medical history of seizure and depression was admitted to [facility] for suicide attempt.  Hospital medicine consulted for medical management.          Past Medical History:   Diagnosis Date    ADHD (attention deficit hyperactivity disorder)     Epilepsy        Past Surgical History:   Procedure Laterality Date    ADENOIDECTOMY      TONSILLECTOMY         Review of patient's allergies indicates:  No Known Allergies    No current facility-administered medications on file prior to encounter.     Current Outpatient Medications on File Prior to Encounter   Medication Sig    hydrOXYzine pamoate (VISTARIL) 50 MG Cap Take 1 capsule (50 mg total) by mouth every 6 (six) hours as needed (Anxiety).    levetiracetam XR (KEPPRA XR) 750 mg Tb24 tablet Take 2 tablets (1,500 mg total) by mouth once daily.    sertraline (ZOLOFT) 25 MG tablet Take 1 tablet (25 mg total) by mouth once daily.     Family History       Problem Relation (Age of Onset)    Heart disease Father    Hypertension Father          Tobacco Use    Smoking status: Every Day     Current packs/day: 1.00     Average packs/day: 1 pack/day for 9.0 years (9.0 ttl pk-yrs)     Types: Cigarettes     Start date: 6/12/2016    Smokeless tobacco: Not on file   Substance and Sexual Activity    Alcohol use: Yes      Alcohol/week: 12.0 standard drinks of alcohol     Types: 12 Cans of beer per week    Drug use: Not Currently     Frequency: 3.0 times per week     Types: Marijuana    Sexual activity: Never     Review of Systems   Constitutional:  Negative for chills and fever.   Respiratory:  Negative for cough, shortness of breath and wheezing.    Cardiovascular:  Negative for chest pain and palpitations.   Gastrointestinal:  Negative for abdominal distention, constipation, diarrhea, nausea and vomiting.   Genitourinary:  Negative for difficulty urinating and dysuria.     Objective:     Vital Signs (Most Recent):  Temp: 97.7 °F (36.5 °C) (06/10/25 0718)  Pulse: (!) 56 (06/10/25 0718)  Resp: 18 (06/10/25 0718)  BP: 118/69 (06/10/25 0718)  SpO2: 98 % (06/10/25 0718) Vital Signs (24h Range):  Temp:  [97.7 °F (36.5 °C)-97.8 °F (36.6 °C)] 97.7 °F (36.5 °C)  Pulse:  [56-59] 56  Resp:  [18-20] 18  SpO2:  [98 %-99 %] 98 %  BP: (118-139)/(69-80) 118/69     Weight: 62.1 kg (136 lb 12.7 oz)  Body mass index is 24.23 kg/m².     Physical Exam  Constitutional:       Appearance: Normal appearance.   HENT:      Head: Normocephalic and atraumatic.   Cardiovascular:      Rate and Rhythm: Normal rate and regular rhythm.   Pulmonary:      Effort: Pulmonary effort is normal.      Breath sounds: Normal breath sounds.   Abdominal:      General: There is no distension.      Tenderness: There is no abdominal tenderness.   Musculoskeletal:         General: No swelling or tenderness.      Cervical back: Neck supple.      Right lower leg: No edema.      Left lower leg: No edema.   Skin:     General: Skin is warm and dry.   Neurological:      Mental Status: He is alert.      Cranial Nerves: Cranial nerves 2-12 are intact.      Comments: CN II: Visual Fields full to confrontation  CN III, IV , VI PERRL   CN III: no palsy   Nystagmus: none  Diplopia: none  Ophthalmoparesis: none  CN V Facial sensation intact  CN VII facial expression full, symmetric  CN VIII  normal  CN IX normal  CN X normal  CN XI normal  CN XII normal                 Significant Labs: UPT  No results found for this or any previous visit.  U/A  Negative for UTI   UDS  Results for orders placed or performed during the hospital encounter of 06/08/25   Drug screen panel, emergency    Collection Time: 06/08/25  1:41 PM   Result Value Ref Range    Benzodiazepine, Urine Negative Negative    Methadone, Urine Negative Negative    Cocaine, Urine Negative Negative    Opiates, Urine Negative Negative    Barbiturates, Urine Negative Negative    Amphetamines, Urine Negative Negative    THC Presumptive Positive (A) Negative    Phencyclidine, Urine Negative Negative    Urine Creatinine 5.6 (L) 23.0 - 375.0 mg/dL     CBC  Results for orders placed or performed during the hospital encounter of 06/08/25   CBC with Differential    Collection Time: 06/08/25  1:57 PM   Result Value Ref Range    WBC 5.69 3.90 - 12.70 K/uL    RBC 4.80 4.60 - 6.20 M/uL    HGB 15.2 14.0 - 18.0 gm/dL    HCT 42.2 40.0 - 54.0 %    MCV 88 82 - 98 fL    MCH 31.7 (H) 27.0 - 31.0 pg    MCHC 36.0 32.0 - 36.0 g/dL    RDW 11.7 11.5 - 14.5 %    Platelet Count 276 150 - 450 K/uL    MPV 10.4 9.2 - 12.9 fL    Nucleated RBC 0 <=0 /100 WBC    Neut % 54.0 38 - 73 %    Lymph % 33.4 18 - 48 %    Mono % 10.0 4 - 15 %    Eos % 1.2 <=8 %    Basophil % 0.7 <=1.9 %    Imm Grans % 0.7 (H) 0.0 - 0.5 %    Neut # 3.07 1.8 - 7.7 K/uL    Lymph # 1.90 1 - 4.8 K/uL    Mono # 0.57 0.3 - 1 K/uL    Eos # 0.07 <=0.5 K/uL    Baso # 0.04 <=0.2 K/uL    Imm Grans # 0.04 0.00 - 0.04 K/uL   Results for orders placed or performed during the hospital encounter of 07/18/21   CBC Auto Differential    Collection Time: 07/18/21 11:04 AM   Result Value Ref Range    WBC 4.64 3.90 - 12.70 K/uL    RBC 4.87 4.60 - 6.20 M/uL    Hemoglobin 15.5 14.0 - 18.0 g/dL    Hematocrit 45.2 40.0 - 54.0 %    MCV 93 82 - 98 fL    MCH 31.8 (H) 27.0 - 31.0 pg    MCHC 34.3 32.0 - 36.0 g/dL    RDW 12.0 11.5 - 14.5  %    Platelets 234 150 - 450 K/uL    MPV 11.9 9.2 - 12.9 fL    Immature Granulocytes 0.2 0.0 - 0.5 %    Gran # (ANC) 2.3 1.8 - 7.7 K/uL    Immature Grans (Abs) 0.01 0.00 - 0.04 K/uL    Lymph # 1.9 1.0 - 4.8 K/uL    Mono # 0.4 0.3 - 1.0 K/uL    Eos # 0.0 0.0 - 0.5 K/uL    Baso # 0.02 0.00 - 0.20 K/uL    nRBC 0 0 /100 WBC    Gran % 48.7 38.0 - 73.0 %    Lymph % 41.8 18.0 - 48.0 %    Mono % 8.0 4.0 - 15.0 %    Eosinophil % 0.9 0.0 - 8.0 %    Basophil % 0.4 0.0 - 1.9 %    Differential Method Automated      CMP  Results for orders placed or performed during the hospital encounter of 06/08/25   Comprehensive metabolic panel    Collection Time: 06/08/25  1:57 PM   Result Value Ref Range    Sodium 135 (L) 136 - 145 mmol/L    Potassium 3.7 3.5 - 5.1 mmol/L    Chloride 106 95 - 110 mmol/L    CO2 19 (L) 23 - 29 mmol/L    Glucose 98 70 - 110 mg/dL    BUN 12 6 - 20 mg/dL    Creatinine 0.8 0.5 - 1.4 mg/dL    Calcium 9.8 8.7 - 10.5 mg/dL    Protein Total 7.8 6.0 - 8.4 gm/dL    Albumin 4.6 3.5 - 5.2 g/dL    Bilirubin Total 1.0 0.1 - 1.0 mg/dL    ALP 66 40 - 150 unit/L    AST 48 (H) 11 - 45 unit/L    ALT 33 10 - 44 unit/L    Anion Gap 10 8 - 16 mmol/L    eGFR >60 >60 mL/min/1.73/m2     TSH  Results for orders placed or performed during the hospital encounter of 05/31/25   TSH    Collection Time: 05/31/25  3:44 AM   Result Value Ref Range    TSH 1.552 0.400 - 4.000 uIU/mL     ETOH  Results for orders placed or performed during the hospital encounter of 06/08/25   Ethanol    Collection Time: 06/08/25  1:57 PM   Result Value Ref Range    Alcohol, Serum <10 <10 mg/dL     Salicylate  Results for orders placed or performed during the hospital encounter of 05/31/25   Salicylate Level    Collection Time: 05/31/25  3:44 AM   Result Value Ref Range    Salicylate Level <5.0 (L) 15.0 - 30.0 mg/dL     Acetaminophen  Results for orders placed or performed during the hospital encounter of 06/08/25   Acetaminophen level    Collection Time: 06/08/25   1:57 PM   Result Value Ref Range    Acetaminophen Level <3.0 (L) 10.0 - 20.0 ug/ml             Significant Imaging: none  Assessment/Plan:     Epilepsy seizure, generalized, convulsive  Resume home keppra         VTE Risk Mitigation (From admission, onward)      None                Thank you for your consult. I will sign off. Please contact us if you have any additional questions.    Mitra Farfan PA-C  Department of Hospital Medicine   [facility]

## 2025-06-10 NOTE — PROGRESS NOTES
"PSYCHIATRY DAILY INPATIENT PROGRESS NOTE  SUBSEQUENT HOSPITAL VISIT    ENCOUNTER DATE: 6/10/2025  SITE: [facility]    DATE OF ADMISSION: 6/8/2025  5:15 PM  LENGTH OF STAY: 2 days      CHIEF COMPLAINT   Reed Middleton is a 25 y.o. male, seen during daily alfredo rounds on the inpatient unit.  Reed Middleton presented with the chief complaint of depression, anxiety, psychosis, and substance problems      The patient was seen and examined. The chart was reviewed.     Reviewed notes from Rns and LPC and labs from the last 24 hours.    The patient's case was discussed with the treatment team/care providers today including Rns    Staff reports no behavioral or management issues.     The patient has been compliant with treatment.      Subjective 06/10/2025       Today the patient reports he understands cessation of drugs and alcohol would be beneficial for his mental health, "I'm just partying too much," but now recognizes how he is feeling better not under the influence of these substance. Encouraged compliance with medications as prescribed, pt verbalized understanding.     The patient denies any side effects to medications.    At this time symptoms remains severe, functionally and behaviorally impairing and pt remains in need of continued acute inpatient hospitalization for both safety and stabilization.             Interim/overnight events per report/notes:          Psychiatric ROS (observed, reported, or endorsed/denied):  Depressed mood - No  Interest/pleasure/anhedonia: No  Guilt/hopelessness/worthlessness - No  Changes in Sleep - No  Changes in Appetite - No  Changes in Concentration - No  Changes in Energy - Yes  PMA/R- No  Suicidal- active/passive ideations - denies  Homicidal ideations: active/passive ideations - denies    Hallucinations - denies  Delusions - No  Disorganized behavior - No  Disorganized speech - No  Negative symptoms - No    Elevated mood - No  Decreased need for sleep - " No  Grandiosity - No  Racing thoughts - No  Impulsivity - No  Irritability- No  Increased energy - No  Distractibility - No  Increase in goal-directed activity or PMA- No    Symptoms of SHAISTA - less  Symptoms of Panic Disorder- No  Symptoms of PTSD - No        Overall progress: Patient is showing mild improvement         Psychotherapy:  Target symptoms: depression, anxiety , substance abuse  Why chosen therapy is appropriate versus another modality: relevant to diagnosis  Outcome monitoring methods: self-report  Therapeutic intervention type: supportive psychotherapy  Topics discussed/themes: building skills sets for symptom management, symptom recognition  The patient's response to the intervention is accepting. The patient's progress toward treatment goals is fair.   Duration of intervention: 16 minutes.        Medical ROS  Review of Systems   Constitutional:  Negative for chills and fever.   HENT:  Negative for hearing loss.    Eyes:  Negative for blurred vision and double vision.   Respiratory:  Negative for shortness of breath.    Cardiovascular:  Negative for chest pain and palpitations.   Gastrointestinal:  Negative for constipation, diarrhea, nausea and vomiting.   Genitourinary:  Negative for dysuria.   Musculoskeletal:  Negative for back pain and neck pain.   Skin:  Negative for rash.   Neurological:  Negative for dizziness and headaches.   Endo/Heme/Allergies:  Negative for environmental allergies.         PAST MEDICAL HISTORY   Past Medical History:   Diagnosis Date    ADHD (attention deficit hyperactivity disorder)     Epilepsy            PSYCHOTROPIC MEDICATIONS   Scheduled Meds:   diazePAM  10 mg Oral TID    levETIRAcetam  750 mg Oral BID    sertraline  50 mg Oral Daily     Continuous Infusions:  PRN Meds:.  Current Facility-Administered Medications:     acetaminophen, 650 mg, Oral, Q6H PRN    aluminum-magnesium hydroxide-simethicone, 30 mL, Oral, Q6H PRN    benzonatate, 100 mg, Oral, TID PRN     benztropine mesylate, 2 mg, Intramuscular, Q8H PRN    hydrOXYzine pamoate, 50 mg, Oral, Q6H PRN    loperamide, 2 mg, Oral, PRN    lorazepam, 2 mg, Intramuscular, Once PRN    LORazepam, 1 mg, Oral, Q6H PRN    nicotine, 1 patch, Transdermal, Daily PRN    OLANZapine, 10 mg, Oral, Q8H PRN **AND** OLANZapine, 10 mg, Intramuscular, Q8H PRN    ondansetron, 4 mg, Oral, Q8H PRN    promethazine, 25 mg, Oral, Q6H PRN        EXAMINATION    VITALS   Vitals:    06/08/25 1902 06/09/25 0721 06/09/25 1928 06/10/25 0718   BP: 124/76 134/84 139/80 118/69   BP Location:  Left arm Right arm    Patient Position:  Sitting Sitting    Pulse: (!) 58 70 (!) 59 (!) 56   Resp: 18 18 20 18   Temp: 97.5 °F (36.4 °C) 97 °F (36.1 °C) 97.8 °F (36.6 °C) 97.7 °F (36.5 °C)   TempSrc: Temporal      SpO2: 98% 95% 99% 98%   Weight:       Height:           Body mass index is 24.23 kg/m².        CONSTITUTIONAL  General Appearance: unremarkable, age appropriate    MUSCULOSKELETAL  Muscle Strength and Tone:no tremor, no tic  Abnormal Involuntary Movements: No  Gait and Station: non-ataxic    PSYCHIATRIC   Level of Consciousness: awake and alert   Orientation: person, place, and situation  Grooming: Casually dressed and Well groomed  Psychomotor Behavior: normal, cooperative  Speech: normal tone, normal rate, normal pitch, normal volume  Language: grossly intact  Mood: fine  Affect: Consistent with mood  Thought Process: linear, logical  Associations: intact   Thought Content: denies SI, denies HI, and no delusions  Perceptions: denies AH and denies  VH  Memory: Able to recall past events, Remote intact, and Recent intact  Attention:Attends to interview without distraction  Fund of Knowledge: Aware of current events and Vocabulary appropriate   Estimate if Intelligence:  Average based on work/education history, vocabulary and mental status exam  Insight: has awareness of illness  Judgment: behavior is adequate to circumstances        DIAGNOSTIC TESTING    Laboratory Results  No results found for this or any previous visit (from the past 24 hours).        MEDICAL DECISION MAKING          ASSESSMENT         Unspecified mood disorder  SHAISTA with panic attacks  SI  Cannabis abuse  Alcohol abuse     H/o Seizures           PROBLEM LIST AND MANAGEMENT PLANS           Unspecified mood disorder  - increase zoloft 25 to 50 mg PO qd  - pt counseled  - follow up with outpatient mental health providers after discharge for medication management and psychotherapy     Unspecified psychosis  - consider zyprexa   - pt counseled  - follow up with outpatient mental health providers after discharge for medication management and psychotherapy     SHAISTA with panic attacks  - increase zoloft 25 to 50 mg PO qd  - pt counseled  - follow up with outpatient mental health providers after discharge for medication management and psychotherapy     Suicidal ideations  - continue psychiatric hospitalization  - provide psychotherapeutic interventions and medication management        Cannabis abuse  - consider gabapentin  - pt counseled  - follow up with outpatient mental health providers after discharge for medication management and psychotherapy        Alcohol abuse  - continue valium 10 mg PO TID for detox, will taper as tolerated  -decrease to BID  - start Ativan 1 mg PO q 6 hours PRN for vital sign parameters  - pt counseled  - follow up with outpatient mental health providers after discharge for medication management and psychotherapy     H/o Seizures  - continue keppra 750 mg PO BID  - pt counseled  - consider neurology consult                   Discussed diagnosis, risks and benefits of proposed treatment vs alternative treatments vs no treatment, potential side effects of these treatments and the inherent unpredictability of treatment. The patient expresses understanding of the above and displays the capacity to agree with this treatment given said understanding. Patient also agrees that,  currently, the benefits outweigh the risks and would like to pursue/continue treatment at this time.    Any medications being used off-label were discussed with the patient inclusive of the evidence base for the use of the medications and consent was obtained for the off-label use of the medication.       DISCHARGE PLANNING  Expected Disposition Plan: Home or Self Care      NEED FOR CONTINUED HOSPITALIZATION  Psychiatric illness continues to pose a potential threat to life or bodily function, of self or others, thereby requiring the need for continued inpatient psychiatric hospitalization: Yes, due to: danger to self, as evidenced by:  Ongoing concerns with SI. and Ongoing concerns with perceptual aberrancy and paranoid persecutory delusions leading to potential harm of self or others.    Protective inpatient pyschiatric hospitalization required while a safe disposition plan is enacted: Yes    Patient stabilized and ready for discharge from inpatient psychiatric unit: No        STAFF:   Eliot Stephens III, MD  Psychiatry

## 2025-06-10 NOTE — MEDICAL/APP STUDENT
"PSYCHIATRY DAILY INPATIENT PROGRESS NOTE  SUBSEQUENT HOSPITAL VISIT    ENCOUNTER DATE: 6/10/2025  SITE: [facility]    DATE OF ADMISSION: 6/8/2025  5:15 PM  LENGTH OF STAY: 2 days      CHIEF COMPLAINT   Reed Middleton is a 25 y.o. male, seen during daily alfredo rounds on the inpatient unit.  Reed Middleton presented with the chief complaint of substance abuse, thoughts of death/suicide, and overdose      The patient was seen and examined. The chart was reviewed.     Reviewed notes from Rns, MD, CTRS, and LPC and labs from the last 24 hours.    The patient's case was discussed with the treatment team/care providers today including MD    Staff reports no behavioral or management issues.     The patient has been compliant with treatment.      Subjective 06/10/2025       Today the patient reports feeling "great" stating "I feel the best since I've been here". He was sitting relaxed in bed and was very cooperative, interactive, and polite throughout the interview. He is alert and oriented to person, place, and time, but states he didn't believe he was suppose to be here as he had to be admitted to the [facility] to get a "brain scan" due to his hx of epilepsy. He states he had an accidental overdose after drinking and taking his hydroxyzine.     He describes his mood as "positive, looking forward to big jobs". He states his anxiety is a 3/10, with it mostly being related to upcoming work commitments, and his depression being a 0/10, stating "none at all". He reports no active SI or HI, stating he only felt suicidal due to combining alcohol and hydroxyzine. He denies any TH or AH but states he feels that "stuff moves a bit" due to his epilepsy, reporting this has been going on for a long time. Pt states he had a panic attack after his most recent seizure, which has never happened before.      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     Pt states he was able to sleep last night, with a little difficulty. He reports he hasn't been eating as much as usual due to "the food isn't great, I'm used to a 5 course meal, I wouldn't even feed this to a dog".     Overall since pt has been here he reports having more energy with desire to move around, feeling motivated and happy. He states he is not currently having any symptoms of withdrawal.     The patient denies any side effects to medications.      At this time symptoms remains severe, functionally and behaviorally impairing and pt remains in need of continued acute inpatient hospitalization for both safety and stabilization.         Interim/overnight events per report/notes:  - No acute events reported overnight.      Psychiatric ROS (observed, reported, or endorsed/denied):  Depressed mood - denies  Interest/pleasure/anhedonia: denies  Guilt/hopelessness/worthlessness - denies  Changes in Sleep - denies  Changes in Appetite - denies  Changes in Concentration - denies  Changes in Energy - improving steadily  PMA/R- denies  Suicidal- active/passive ideations - denies  Homicidal ideations: active/passive ideations - denies    Hallucinations - denies TH and AH, but states he feels that "stuff moves a bit" due to his epilepsy, reporting this has been going on for a long time   Delusions - reports need to call secret service for his job at a 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 called [employer]   Disorganized behavior - No  Disorganized speech - No  Negative symptoms - No    Elevated mood - appeared to have elevated mood  Decreased need for sleep - denies  Grandiosity - No  Racing thoughts - denies  Impulsivity - denies  Irritability- denies  Increased energy - Yes  Distractibility - denies  Increase in goal-directed activity or PMA- denies    Symptoms of SHAISTA - Yes  Symptoms of Panic Disorder- Yes  Symptoms of " PTSD - No      Overall progress: Patient is showing moderate improvement      Medical ROS  Review of Systems   Constitutional:  Negative for chills and diaphoresis.   Respiratory:  Negative for shortness of breath.    Gastrointestinal:  Negative for abdominal pain, nausea and vomiting.   Musculoskeletal:  Negative for back pain and neck pain.   Skin:  Negative for rash.   Neurological:  Negative for tremors.         PAST MEDICAL HISTORY   Past Medical History:   Diagnosis Date    ADHD (attention deficit hyperactivity disorder)     Epilepsy            PSYCHOTROPIC MEDICATIONS   Scheduled Meds:   diazePAM  10 mg Oral TID    levETIRAcetam  750 mg Oral BID    sertraline  50 mg Oral Daily     Continuous Infusions:  PRN Meds:.  Current Facility-Administered Medications:     acetaminophen, 650 mg, Oral, Q6H PRN    aluminum-magnesium hydroxide-simethicone, 30 mL, Oral, Q6H PRN    benzonatate, 100 mg, Oral, TID PRN    benztropine mesylate, 2 mg, Intramuscular, Q8H PRN    hydrOXYzine pamoate, 50 mg, Oral, Q6H PRN    loperamide, 2 mg, Oral, PRN    lorazepam, 2 mg, Intramuscular, Once PRN    LORazepam, 1 mg, Oral, Q6H PRN    nicotine, 1 patch, Transdermal, Daily PRN    OLANZapine, 10 mg, Oral, Q8H PRN **AND** OLANZapine, 10 mg, Intramuscular, Q8H PRN    ondansetron, 4 mg, Oral, Q8H PRN    promethazine, 25 mg, Oral, Q6H PRN        EXAMINATION    VITALS   Vitals:    06/08/25 1902 06/09/25 0721 06/09/25 1928 06/10/25 0718   BP: 124/76 134/84 139/80 118/69   BP Location:  Left arm Right arm    Patient Position:  Sitting Sitting    Pulse: (!) 58 70 (!) 59 (!) 56   Resp: 18 18 20 18   Temp: 97.5 °F (36.4 °C) 97 °F (36.1 °C) 97.8 °F (36.6 °C) 97.7 °F (36.5 °C)   TempSrc: Temporal      SpO2: 98% 95% 99% 98%   Weight:       Height:           Body mass index is 24.23 kg/m².        CONSTITUTIONAL  General Appearance: unremarkable, age appropriate    MUSCULOSKELETAL  Muscle Strength and Tone:no tremor, no tic  Abnormal Involuntary  Movements: No  Gait and Station: non-ataxic    PSYCHIATRIC   Level of Consciousness: awake, alert , and oriented  Orientation: person, place, situation, month of year, and year  Grooming: Casually dressed, Hospital garb, and Well groomed  Psychomotor Behavior: normal, friendly and cooperative, eye contact normal  Speech: normal tone, normal rate, normal pitch, normal volume, moderate difficulty understanding pt due to speech impediment  Language: grossly intact  Mood: great  Affect: Consistent with mood  Thought Process: linear, logical  Associations: intact   Thought Content: denies SI and denies HI  Perceptions: less AH, denies AH, no TH, and not RIS  Memory: Able to recall past events, Remote intact, and Recent intact  Attention:Attends to interview without distraction  Fund of Knowledge: Aware of current events and Vocabulary appropriate   Estimate if Intelligence:  Below average based on work/education history, vocabulary and mental status exam  Insight: has awareness of illness  Judgment: behavior is adequate to circumstances        DIAGNOSTIC TESTING   Laboratory Results  No results found for this or any previous visit (from the past 24 hours).      MEDICAL DECISION MAKING      ASSESSMENT:   Unspecified mood disorder  SHAISTA with panic attacks  SI  Cannabis abuse  Alcohol abuse     H/o Seizures        PROBLEM LIST AND MANAGEMENT PLANS    Unspecified mood disorder  - increase zoloft 25 to 50 mg PO qd  - pt counseled  - follow up with outpatient mental health providers after discharge for medication management and psychotherapy     Unspecified psychosis  - consider zyprexa   - pt counseled  - follow up with outpatient mental health providers after discharge for medication management and psychotherapy     SHAISTA with panic attacks  - increase zoloft 25 to 50 mg PO qd  - pt counseled  - follow up with outpatient mental health providers after discharge for medication management and psychotherapy     Suicidal  ideations  - continue psychiatric hospitalization  - provide psychotherapeutic interventions and medication management        Cannabis abuse  - consider gabapentin  - pt counseled  - follow up with outpatient mental health providers after discharge for medication management and psychotherapy        Alcohol abuse  - continue valium 10 mg PO TID for detox, will taper as tolerated  - start Ativan 1 mg PO q 6 hours PRN for vital sign parameters  - pt counseled  - follow up with outpatient mental health providers after discharge for medication management and psychotherapy     H/o Seizures  - continue keppra 750 mg PO BID  - pt counseled  - consider neurology consult            Discussed diagnosis, risks and benefits of proposed treatment vs alternative treatments vs no treatment, potential side effects of these treatments and the inherent unpredictability of treatment. The patient expresses understanding of the above and displays the capacity to agree with this treatment given said understanding. Patient also agrees that, currently, the benefits outweigh the risks and would like to pursue/continue treatment at this time.    Any medications being used off-label were discussed with the patient inclusive of the evidence base for the use of the medications and consent was obtained for the off-label use of the medication.       DISCHARGE PLANNING  Expected Disposition Plan: Pt would like to be discharged to home/self, with outpatient f/u and/or rehab      NEED FOR CONTINUED HOSPITALIZATION  Psychiatric illness continues to pose a potential threat to life or bodily function, of self or others, thereby requiring the need for continued inpatient psychiatric hospitalization: Yes, due to: danger to self, danger to others, and gravely disabled, as evidenced by:  Concerns with SI--Fading. and Ongoing concerns with grave disability with patient unable to perform basic feeding, hygiene and dressing activities without significant  constant support.    Protective inpatient pyschiatric hospitalization required while a safe disposition plan is enacted: Yes    Patient stabilized and ready for discharge from inpatient psychiatric unit: No        STAFF:   Georgia Vieira MS3  Psychiatry

## 2025-06-11 PROCEDURE — 90833 PSYTX W PT W E/M 30 MIN: CPT | Mod: ,,, | Performed by: STUDENT IN AN ORGANIZED HEALTH CARE EDUCATION/TRAINING PROGRAM

## 2025-06-11 PROCEDURE — 25000003 PHARM REV CODE 250: Performed by: PSYCHIATRY & NEUROLOGY

## 2025-06-11 PROCEDURE — 25000003 PHARM REV CODE 250: Performed by: STUDENT IN AN ORGANIZED HEALTH CARE EDUCATION/TRAINING PROGRAM

## 2025-06-11 PROCEDURE — 11400000 HC PSYCH PRIVATE ROOM

## 2025-06-11 PROCEDURE — 99232 SBSQ HOSP IP/OBS MODERATE 35: CPT | Mod: ,,, | Performed by: STUDENT IN AN ORGANIZED HEALTH CARE EDUCATION/TRAINING PROGRAM

## 2025-06-11 RX ADMIN — HYDROXYZINE PAMOATE 50 MG: 50 CAPSULE ORAL at 08:06

## 2025-06-11 RX ADMIN — SERTRALINE HYDROCHLORIDE 50 MG: 50 TABLET ORAL at 08:06

## 2025-06-11 RX ADMIN — DIAZEPAM 10 MG: 10 TABLET ORAL at 08:06

## 2025-06-11 RX ADMIN — LEVETIRACETAM 750 MG: 500 TABLET, FILM COATED ORAL at 08:06

## 2025-06-11 RX ADMIN — ACETAMINOPHEN 650 MG: 325 TABLET ORAL at 03:06

## 2025-06-11 NOTE — PROGRESS NOTES
"   06/11/25 1015   [facility] Group Therapy   Group Name Therapeutic Recreation   Specific Interventions Cognitive Stimulation Training  (my strengths and qualities skilled worksheet/triva match-up activity)   Participation Level Appropriate;Sharing;Attentive   Participation Quality Cooperative;Social   Insight/Motivation Applies New Skills;Improved   Affect/Mood Display Appropriate   Cognition Alert   Psychomotor WNL     Patient presents alert, calm, cooperative, reports a "great" mood. Patient shows interest, good concentration and remains involved. Patient shared that he is good at working and eating. Challenges he has overcome are, being small, not respected and being bullied. He most value his job, mom and dad.  "

## 2025-06-11 NOTE — MEDICAL/APP STUDENT
"PSYCHIATRY DAILY INPATIENT PROGRESS NOTE  SUBSEQUENT HOSPITAL VISIT    ENCOUNTER DATE: 6/11/2025  SITE: [facility]    DATE OF ADMISSION: 6/8/2025  5:15 PM  LENGTH OF STAY: 3 days      CHIEF COMPLAINT   Reed Middleton is a 25 y.o. male, seen during daily alfredo rounds on the inpatient unit.  Reed Middleton presented with the chief complaint of depression, anxiety, psychosis, and substance problems      The patient was seen and examined. The chart was reviewed.     Reviewed notes from Rns, PA, CTRS, and RD and labs from the last 24 hours.    The patient's case was discussed with the treatment team/care providers today including Rns    Staff reports no behavioral or management issues.     The patient has been compliant with treatment.      Subjective 06/11/2025       Today the patient reports feeling better than yesterday, stating he has more energy and feels happy. He does endorse his energy has been a bit more elevated than usual. Pt was lying relaxed and comfortable in bed during interview. He is alert and oriented to person, place, and time. Throughout interview he continually endorsed wanting to leave due to needing to get back to work ASAP.     Pt describes his mood as "really happy, but frustrated" due to his desire to be discharged and return to work. He reports his anxiety as a 2/10, as he feels like he has a lot on his mind about his job. He reports his depression as a 0/10. He denies any active SI or HI, AH, VH, TH, or delusions.     He reports sleeping well last night. He states he hasn't been eating the food but does endorse having an appetite. He reports good PO liquid intake. He states he has no symptoms of substance withdrawal.     Pt reports he will be going to live with his parents after he is discharged.     The patient reports side effects of  feeling sleepy from the Valium .    At this time symptoms remains severe, functionally and behaviorally impairing and pt remains in need of " continued acute inpatient hospitalization for both safety and stabilization.           Interim/overnight events per report/notes:          Psychiatric ROS (observed, reported, or endorsed/denied):  Depressed mood - denies  Interest/pleasure/anhedonia: No  Guilt/hopelessness/worthlessness - No  Changes in Sleep - denies  Changes in Appetite - denies  Changes in Concentration - No  Changes in Energy - Yes  PMA/R- No  Suicidal- active/passive ideations - denies  Homicidal ideations: active/passive ideations - denies    Hallucinations - denies  Delusions - No  Disorganized behavior - No  Disorganized speech - No  Negative symptoms - No    Elevated mood - No  Decreased need for sleep - No  Grandiosity - No  Racing thoughts - No  Impulsivity - No  Irritability- No  Increased energy - No  Distractibility - No  Increase in goal-directed activity or PMA- No    Symptoms of SHAISTA - decreasing steadily  Symptoms of Panic Disorder- No  Symptoms of PTSD - No        Overall progress: Patient is showing moderate improvement        Psychotherapy:  Target symptoms: depression, anxiety , substance abuse  Why chosen therapy is appropriate versus another modality: relevant to diagnosis  Outcome monitoring methods: self-report  Therapeutic intervention type: supportive psychotherapy  Topics discussed/themes: building skills sets for symptom management, symptom recognition  The patient's response to the intervention is accepting. The patient's progress toward treatment goals is fair.   Duration of intervention: 16 minutes.        Medical ROS  Review of Systems   Constitutional:  Negative for chills, diaphoresis, fever and malaise/fatigue.   Eyes:  Negative for blurred vision.   Respiratory:  Negative for shortness of breath.    Cardiovascular:  Negative for chest pain.   Gastrointestinal:  Negative for abdominal pain, diarrhea, nausea and vomiting.   Neurological:  Negative for dizziness, tremors and headaches.   Psychiatric/Behavioral:   "Negative for depression, hallucinations and suicidal ideas.          PAST MEDICAL HISTORY   Past Medical History:   Diagnosis Date    ADHD (attention deficit hyperactivity disorder)     Epilepsy            PSYCHOTROPIC MEDICATIONS   Scheduled Meds:   diazePAM  10 mg Oral BID    levETIRAcetam  750 mg Oral BID    sertraline  50 mg Oral Daily     Continuous Infusions:  PRN Meds:.  Current Facility-Administered Medications:     acetaminophen, 650 mg, Oral, Q6H PRN    aluminum-magnesium hydroxide-simethicone, 30 mL, Oral, Q6H PRN    benzonatate, 100 mg, Oral, TID PRN    benztropine mesylate, 2 mg, Intramuscular, Q8H PRN    hydrOXYzine pamoate, 50 mg, Oral, Q6H PRN    loperamide, 2 mg, Oral, PRN    lorazepam, 2 mg, Intramuscular, Once PRN    LORazepam, 1 mg, Oral, Q6H PRN    nicotine, 1 patch, Transdermal, Daily PRN    OLANZapine, 10 mg, Oral, Q8H PRN **AND** OLANZapine, 10 mg, Intramuscular, Q8H PRN    ondansetron, 4 mg, Oral, Q8H PRN    promethazine, 25 mg, Oral, Q6H PRN        EXAMINATION    VITALS   Vitals:    06/10/25 1206 06/10/25 1916 06/11/25 0731 06/11/25 0932   BP:  (!) 144/87 (!) 137/99    BP Location:  Left arm Left arm    Patient Position:  Sitting Sitting    Pulse:  65 67    Resp:  20 18    Temp:  97.3 °F (36.3 °C) 96.9 °F (36.1 °C)    TempSrc:   Tympanic    SpO2:  98% 100%    Weight: 62.1 kg (136 lb 12.7 oz)   60.3 kg (133 lb 0.8 oz)   Height: 5' 3" (1.6 m)          Body mass index is 24.23 kg/m².        CONSTITUTIONAL  General Appearance: unremarkable, age appropriate    MUSCULOSKELETAL  Muscle Strength and Tone:no tremor, no tic  Abnormal Involuntary Movements: No  Gait and Station: non-ataxic    PSYCHIATRIC   Level of Consciousness: awake, alert , and oriented  Orientation: person, place, time, and situation  Grooming: Casually dressed, Hospital garb, and Well groomed  Psychomotor Behavior: normal, friendly and cooperative, eye contact normal  Speech: normal tone, normal rate, normal pitch, normal " "volume  Language: grossly intact  Mood: "really happy, but frustrated" due to his desire to be discharged and return to work  Affect: Consistent with mood and Full  Thought Process: linear, logical  Associations: intact   Thought Content: denies SI, denies HI, and no delusions  Perceptions: denies AH, denies  VH, and no TH  Memory: Able to recall past events, Remote intact, and Recent intact  Attention:Attends to interview without distraction  Fund of Knowledge: Aware of current events and Vocabulary appropriate   Estimate if Intelligence:  Average based on work/education history, vocabulary and mental status exam  Insight: has awareness of illness  Judgment: behavior is adequate to circumstances        DIAGNOSTIC TESTING   Laboratory Results  No results found for this or any previous visit (from the past 24 hours).          MEDICAL DECISION MAKING      ASSESSMENT:     Unspecified mood disorder  SHAISTA with panic attacks  SI  Cannabis abuse  Alcohol abuse     H/o Seizures        PROBLEM LIST AND MANAGEMENT PLANS    Unspecified mood disorder  - increase zoloft 25 to 50 mg PO qd  - pt counseled  - follow up with outpatient mental health providers after discharge for medication management and psychotherapy     Unspecified psychosis  - consider zyprexa   - pt counseled  - follow up with outpatient mental health providers after discharge for medication management and psychotherapy     SHAISTA with panic attacks  - increase zoloft 25 to 50 mg PO qd  - pt counseled  - follow up with outpatient mental health providers after discharge for medication management and psychotherapy     Suicidal ideations  - continue psychiatric hospitalization  - provide psychotherapeutic interventions and medication management        Cannabis abuse  - consider gabapentin  - pt counseled  - follow up with outpatient mental health providers after discharge for medication management and psychotherapy        Alcohol abuse  - continue valium 10 mg PO TID " for detox, will taper as tolerated  -decrease to BID  - start Ativan 1 mg PO q 6 hours PRN for vital sign parameters  - pt counseled  - follow up with outpatient mental health providers after discharge for medication management and psychotherapy     H/o Seizures  - continue keppra 750 mg PO BID  - pt counseled  - consider neurology consult            Discussed diagnosis, risks and benefits of proposed treatment vs alternative treatments vs no treatment, potential side effects of these treatments and the inherent unpredictability of treatment. The patient expresses understanding of the above and displays the capacity to agree with this treatment given said understanding. Patient also agrees that, currently, the benefits outweigh the risks and would like to pursue/continue treatment at this time.    Any medications being used off-label were discussed with the patient inclusive of the evidence base for the use of the medications and consent was obtained for the off-label use of the medication.       DISCHARGE PLANNING  Expected Disposition Plan: Home or Self Care      NEED FOR CONTINUED HOSPITALIZATION  Psychiatric illness continues to pose a potential threat to life or bodily function, of self or others, thereby requiring the need for continued inpatient psychiatric hospitalization: Yes, due to: danger to self, as evidenced by:  Concerns with SI--Fading. and Ongoing concerns with perceptual aberrancy and paranoid persecutory delusions leading to potential harm of self or others.    Protective inpatient pyschiatric hospitalization required while a safe disposition plan is enacted: Yes    Patient stabilized and ready for discharge from inpatient psychiatric unit: No        STAFF:   Georgia Vieira MS3  Psychiatry

## 2025-06-11 NOTE — PLAN OF CARE
Problem: Adult Behavioral Health Plan of Care  Goal: Optimized Coping Skills in Response to Life Stressors  Outcome: Progressing  Intervention: Promote Effective Coping Strategies  Flowsheets (Taken 6/10/2025 1340)  Supportive Measures:   active listening utilized   counseling provided   decision-making supported   PROCESS GROUP:    [facility] - provided handout on Effects of My Decisions and will follow-up with patient tomorrow.

## 2025-06-11 NOTE — TELEPHONE ENCOUNTER
Pt currently admitted advised cousin he's going to be put on regimen but very important for him to get seen after hospital d/c where we can get him in with psychiatrist. Cousin verbalized understanding.

## 2025-06-11 NOTE — PLAN OF CARE
Problem: Adult Behavioral Health Plan of Care  Goal: Optimized Coping Skills in Response to Life Stressors  6/11/2025 1354 by Lilliana Burr LPC  Outcome: Progressing  6/11/2025 1340 by Lilliana Burr LPC  Outcome: Progressing  Intervention: Promote Effective Coping Strategies  6/11/2025 1354 by Lilliana Burr LPC  Flowsheets (Taken 6/11/2025 1354)  Supportive Measures:   active listening utilized   counseling provided   verbalization of feelings encouraged   PROCESS GROUP:     [facility] was unable to conduct group due to [facility] illness - provided handout on Trouble Communicating and will follow-up with patient tomorrow.

## 2025-06-11 NOTE — PLAN OF CARE
Verbal contract for safety obtained. VSS. Mood improved. Interacting with peers and staff. Excited about upcoming discharge. Stated he feels better now and is going live with his mom so she can make sure he is doing everything correct with his medication. Ambulates. Encouraged to attend groups and activities. Accepts meals and meds without difficulty. Monitored Q/15 minutes for fall and safety precautions per staff.  Will continue to monitor for needs and safety.

## 2025-06-11 NOTE — PROGRESS NOTES
PSYCHIATRY DAILY INPATIENT PROGRESS NOTE  SUBSEQUENT HOSPITAL VISIT    ENCOUNTER DATE: 6/11/2025  SITE: [facility]    DATE OF ADMISSION: 6/8/2025  5:15 PM  LENGTH OF STAY: 3 days      CHIEF COMPLAINT   Reed Middleton is a 25 y.o. male, seen during daily alfredo rounds on the inpatient unit.  Reed Middleton presented with the chief complaint of depression, anxiety, psychosis, and substance problems      The patient was seen and examined. The chart was reviewed.     Reviewed notes from Rns and labs from the last 24 hours.    The patient's case was discussed with the treatment team/care providers today including Rns    Staff reports no behavioral or management issues.     The patient has been compliant with treatment.      Subjective 06/11/2025       Today pt states he is feeling much better, feels medications are helping.    Pt continues to minimize and present inconsistent history.    The patient denies any side effects to medications.    At this time symptoms remains severe, functionally and behaviorally impairing and pt remains in need of continued acute inpatient hospitalization for both safety and stabilization.             Interim/overnight events per report/notes:    Per RN:  Hyper-verbal, excitable, inappropriate laughing. States he is fine and only needed a change in his seizure medications. States he checked him self in to seek help with his medications.       Psychiatric ROS (observed, reported, or endorsed/denied):  Depressed mood - No  Interest/pleasure/anhedonia: No  Guilt/hopelessness/worthlessness - No  Changes in Sleep - No  Changes in Appetite - No  Changes in Concentration - No  Changes in Energy - Yes  PMA/R- No  Suicidal- active/passive ideations - denies  Homicidal ideations: active/passive ideations - denies    Hallucinations - denies  Delusions - No  Disorganized behavior - No  Disorganized speech - No  Negative symptoms - No    Elevated mood - No  Decreased need for sleep -  No  Grandiosity - No  Racing thoughts - No  Impulsivity - No  Irritability- No  Increased energy - No  Distractibility - No  Increase in goal-directed activity or PMA- No    Symptoms of SHAISTA - less  Symptoms of Panic Disorder- No  Symptoms of PTSD - No        Overall progress: Patient is showing mild improvement         Psychotherapy:  Target symptoms: depression, anxiety , substance abuse  Why chosen therapy is appropriate versus another modality: relevant to diagnosis  Outcome monitoring methods: self-report  Therapeutic intervention type: supportive psychotherapy  Topics discussed/themes: building skills sets for symptom management, symptom recognition  The patient's response to the intervention is accepting. The patient's progress toward treatment goals is fair.   Duration of intervention: 16 minutes.        Medical ROS  Review of Systems   Constitutional:  Negative for chills and fever.   HENT:  Negative for hearing loss.    Eyes:  Negative for blurred vision and double vision.   Respiratory:  Negative for shortness of breath.    Cardiovascular:  Negative for chest pain and palpitations.   Gastrointestinal:  Negative for constipation, diarrhea, nausea and vomiting.   Genitourinary:  Negative for dysuria.   Musculoskeletal:  Negative for back pain and neck pain.   Skin:  Negative for rash.   Neurological:  Negative for dizziness and headaches.   Endo/Heme/Allergies:  Negative for environmental allergies.         PAST MEDICAL HISTORY   Past Medical History:   Diagnosis Date    ADHD (attention deficit hyperactivity disorder)     Epilepsy            PSYCHOTROPIC MEDICATIONS   Scheduled Meds:   diazePAM  10 mg Oral BID    levETIRAcetam  750 mg Oral BID    sertraline  50 mg Oral Daily     Continuous Infusions:  PRN Meds:.  Current Facility-Administered Medications:     acetaminophen, 650 mg, Oral, Q6H PRN    aluminum-magnesium hydroxide-simethicone, 30 mL, Oral, Q6H PRN    benzonatate, 100 mg, Oral, TID PRN     "benztropine mesylate, 2 mg, Intramuscular, Q8H PRN    hydrOXYzine pamoate, 50 mg, Oral, Q6H PRN    loperamide, 2 mg, Oral, PRN    lorazepam, 2 mg, Intramuscular, Once PRN    LORazepam, 1 mg, Oral, Q6H PRN    nicotine, 1 patch, Transdermal, Daily PRN    OLANZapine, 10 mg, Oral, Q8H PRN **AND** OLANZapine, 10 mg, Intramuscular, Q8H PRN    ondansetron, 4 mg, Oral, Q8H PRN    promethazine, 25 mg, Oral, Q6H PRN        EXAMINATION    VITALS   Vitals:    06/10/25 1206 06/10/25 1916 06/11/25 0731 06/11/25 0932   BP:  (!) 144/87 (!) 137/99    BP Location:  Left arm Left arm    Patient Position:  Sitting Sitting    Pulse:  65 67    Resp:  20 18    Temp:  97.3 °F (36.3 °C) 96.9 °F (36.1 °C)    TempSrc:   Tympanic    SpO2:  98% 100%    Weight: 62.1 kg (136 lb 12.7 oz)   60.3 kg (133 lb 0.8 oz)   Height: 5' 3" (1.6 m)          Body mass index is 23.57 kg/m².        CONSTITUTIONAL  General Appearance: unremarkable, age appropriate    MUSCULOSKELETAL  Muscle Strength and Tone:no tremor, no tic  Abnormal Involuntary Movements: No  Gait and Station: non-ataxic    PSYCHIATRIC   Level of Consciousness: awake and alert   Orientation: person, place, and situation  Grooming: Casually dressed and Well groomed  Psychomotor Behavior: normal, cooperative  Speech: normal tone, normal rate, normal pitch, normal volume  Language: grossly intact  Mood: fine  Affect: Consistent with mood  Thought Process: linear, logical  Associations: intact   Thought Content: denies SI, denies HI, and no delusions  Perceptions: denies AH and denies  VH  Memory: Able to recall past events, Remote intact, and Recent intact  Attention:Attends to interview without distraction  Fund of Knowledge: Aware of current events and Vocabulary appropriate   Estimate if Intelligence:  Average based on work/education history, vocabulary and mental status exam  Insight: has awareness of illness  Judgment: behavior is adequate to circumstances        DIAGNOSTIC TESTING "   Laboratory Results  No results found for this or any previous visit (from the past 24 hours).        MEDICAL DECISION MAKING          ASSESSMENT         Unspecified mood disorder  SHAISTA with panic attacks  SI  Cannabis abuse  Alcohol abuse     H/o Seizures           PROBLEM LIST AND MANAGEMENT PLANS           Unspecified mood disorder  - increase zoloft 25 to 50 mg PO qd  - pt counseled  - follow up with outpatient mental health providers after discharge for medication management and psychotherapy     Unspecified psychosis  - consider zyprexa   - pt counseled  - follow up with outpatient mental health providers after discharge for medication management and psychotherapy     SHAISTA with panic attacks  - increase zoloft 25 to 50 mg PO qd  - pt counseled  - follow up with outpatient mental health providers after discharge for medication management and psychotherapy     Suicidal ideations  - continue psychiatric hospitalization  - provide psychotherapeutic interventions and medication management        Cannabis abuse  - consider gabapentin  - pt counseled  - follow up with outpatient mental health providers after discharge for medication management and psychotherapy        Alcohol abuse  - continue valium 10 mg PO TID for detox, will taper as tolerated  -decrease to BID  - start Ativan 1 mg PO q 6 hours PRN for vital sign parameters  - pt counseled  - follow up with outpatient mental health providers after discharge for medication management and psychotherapy     H/o Seizures  - continue keppra 750 mg PO BID  - pt counseled  - consider neurology consult                   Discussed diagnosis, risks and benefits of proposed treatment vs alternative treatments vs no treatment, potential side effects of these treatments and the inherent unpredictability of treatment. The patient expresses understanding of the above and displays the capacity to agree with this treatment given said understanding. Patient also agrees that,  currently, the benefits outweigh the risks and would like to pursue/continue treatment at this time.    Any medications being used off-label were discussed with the patient inclusive of the evidence base for the use of the medications and consent was obtained for the off-label use of the medication.       DISCHARGE PLANNING  Expected Disposition Plan: Home or Self Care      NEED FOR CONTINUED HOSPITALIZATION  Psychiatric illness continues to pose a potential threat to life or bodily function, of self or others, thereby requiring the need for continued inpatient psychiatric hospitalization: Yes, due to: danger to self, as evidenced by:  Ongoing concerns with SI. and Ongoing concerns with perceptual aberrancy and paranoid persecutory delusions leading to potential harm of self or others.    Protective inpatient pyschiatric hospitalization required while a safe disposition plan is enacted: Yes    Patient stabilized and ready for discharge from inpatient psychiatric unit: No        STAFF:   Eliot Stephens III, MD  Psychiatry

## 2025-06-11 NOTE — PSYCH
"Meditation/Education:  [employer] [employer] : Meditation Exercise  Patient Response:  "Patient attended session               "

## 2025-06-12 VITALS
SYSTOLIC BLOOD PRESSURE: 121 MMHG | RESPIRATION RATE: 18 BRPM | HEART RATE: 80 BPM | DIASTOLIC BLOOD PRESSURE: 70 MMHG | WEIGHT: 133.06 LBS | OXYGEN SATURATION: 99 % | BODY MASS INDEX: 23.57 KG/M2 | HEIGHT: 63 IN | TEMPERATURE: 97 F

## 2025-06-12 PROCEDURE — 25000003 PHARM REV CODE 250: Performed by: STUDENT IN AN ORGANIZED HEALTH CARE EDUCATION/TRAINING PROGRAM

## 2025-06-12 PROCEDURE — 90833 PSYTX W PT W E/M 30 MIN: CPT | Mod: ,,, | Performed by: STUDENT IN AN ORGANIZED HEALTH CARE EDUCATION/TRAINING PROGRAM

## 2025-06-12 PROCEDURE — 99239 HOSP IP/OBS DSCHRG MGMT >30: CPT | Mod: ,,, | Performed by: STUDENT IN AN ORGANIZED HEALTH CARE EDUCATION/TRAINING PROGRAM

## 2025-06-12 PROCEDURE — S4991 NICOTINE PATCH NONLEGEND: HCPCS | Performed by: PSYCHIATRY & NEUROLOGY

## 2025-06-12 PROCEDURE — 25000003 PHARM REV CODE 250: Performed by: PSYCHIATRY & NEUROLOGY

## 2025-06-12 RX ORDER — SERTRALINE HYDROCHLORIDE 50 MG/1
50 TABLET, FILM COATED ORAL DAILY
Qty: 30 TABLET | Refills: 0 | Status: SHIPPED | OUTPATIENT
Start: 2025-06-13 | End: 2025-06-17 | Stop reason: SDUPTHER

## 2025-06-12 RX ORDER — LEVETIRACETAM 750 MG/1
750 TABLET ORAL 2 TIMES DAILY
Qty: 60 TABLET | Refills: 0 | Status: SHIPPED | OUTPATIENT
Start: 2025-06-12 | End: 2025-06-25

## 2025-06-12 RX ADMIN — LEVETIRACETAM 750 MG: 500 TABLET, FILM COATED ORAL at 08:06

## 2025-06-12 RX ADMIN — DIAZEPAM 10 MG: 10 TABLET ORAL at 08:06

## 2025-06-12 RX ADMIN — NICOTINE 1 PATCH: 14 PATCH, EXTENDED RELEASE TRANSDERMAL at 09:06

## 2025-06-12 RX ADMIN — SERTRALINE HYDROCHLORIDE 50 MG: 50 TABLET ORAL at 08:06

## 2025-06-12 RX ADMIN — ACETAMINOPHEN 650 MG: 325 TABLET ORAL at 05:06

## 2025-06-12 NOTE — MEDICAL/APP STUDENT
"PSYCHIATRY DAILY INPATIENT PROGRESS NOTE  SUBSEQUENT HOSPITAL VISIT    ENCOUNTER DATE: 6/12/2025  SITE: [facility]    DATE OF ADMISSION: 6/8/2025  5:15 PM  LENGTH OF STAY: 4 days      CHIEF COMPLAINT   Reed Middleton is a 25 y.o. male, seen during daily alfredo rounds on the inpatient unit.  Reed Middleton presented with the chief complaint of depression, anxiety, psychosis, and substance problems      The patient was seen and examined. The chart was reviewed.     Reviewed notes from Rns, [facility]S, and [facility] and labs from the last 24 hours.    The patient's case was discussed with the treatment team/care providers today including Rns    Staff reports no behavioral or management issues.     The patient has been compliant with treatment.      Subjective 06/12/2025       Today the patient reports feeling great and that the medications are helping. He is alert and oriented to person, time, place, and situation. He is very excited about being discharged today and feels hopeful about the future. He was able to sleep "decently" last night. He reports a good appetite and has been eating snacks but no meals. He reports good liquid PO intake.     He describes his mood as "off the charts good, so excited" to leave. He reports his anxiety about a 1-2/10 due to getting out and having lots to do for work. He states his depression is a 0/10. He denies any active SI or HI, AH, VH, TH, or delusions. He states no withdrawal symptoms.     Pt plans to have his mom pick him up upon discharging and then is going to [place] today with his cousin for work and vacation. He is very future oriented and feels ready to go. In regards to his drinking he states that he plans to cut down, especially with his new medications. He reports being educated on how to take his meds and how to prevent taking too many. In regards to his cannabis use he states he plans to cut back but will "probably still do it socially".       The patient reports " side effects of  feeling lightheaded after taking medications on empty stomach .    At this time symptoms remains severe, functionally and behaviorally impairing and pt remains in need of continued acute inpatient hospitalization for both safety and stabilization.         Interim/overnight events per report/notes:    Per RN:  Mood greatly improved.  Smiling and interacting very well.  Excited about discharge tomorrow. States meds helping him feel less anxious.  Pleasant and polite.  Energetic.          Psychiatric ROS (observed, reported, or endorsed/denied):  Depressed mood - denies  Interest/pleasure/anhedonia: denies  Guilt/hopelessness/worthlessness - denies  Changes in Sleep - denies  Changes in Appetite - denies  Changes in Concentration - improving steadily  Changes in Energy - Yes  PMA/R- No  Suicidal- active/passive ideations - denies  Homicidal ideations: active/passive ideations - denies    Hallucinations - denies  Delusions - No  Disorganized behavior - No  Disorganized speech - No  Negative symptoms - No    Elevated mood - No  Decreased need for sleep - No  Grandiosity - No  Racing thoughts - No  Impulsivity - No  Irritability- No  Increased energy - Yes  Distractibility - No  Increase in goal-directed activity or PMA- No    Symptoms of SHAISTA - less  Symptoms of Panic Disorder- No  Symptoms of PTSD - No        Overall progress: Patient is showing moderate improvement        Psychotherapy:  Target symptoms: depression, anxiety , substance abuse  Why chosen therapy is appropriate versus another modality: relevant to diagnosis  Outcome monitoring methods: self-report  Therapeutic intervention type: supportive psychotherapy  Topics discussed/themes: building skills sets for symptom management, symptom recognition  The patient's response to the intervention is accepting. The patient's progress toward treatment goals is fair.   Duration of intervention: 16 minutes.      Medical ROS  Review of Systems    Constitutional:  Negative for chills, diaphoresis and fever.   HENT:  Negative for congestion, nosebleeds and sinus pain.    Eyes:  Negative for blurred vision and double vision.   Respiratory:  Negative for cough, hemoptysis and shortness of breath.    Cardiovascular:  Negative for chest pain and palpitations.   Gastrointestinal:  Negative for abdominal pain, constipation, diarrhea, nausea and vomiting.   Musculoskeletal:  Negative for back pain and neck pain.   Neurological:  Negative for tremors and headaches.         PAST MEDICAL HISTORY   Past Medical History:   Diagnosis Date    ADHD (attention deficit hyperactivity disorder)     Epilepsy            PSYCHOTROPIC MEDICATIONS   Scheduled Meds:   diazePAM  10 mg Oral BID    levETIRAcetam  750 mg Oral BID    sertraline  50 mg Oral Daily     Continuous Infusions:  PRN Meds:.  Current Facility-Administered Medications:     acetaminophen, 650 mg, Oral, Q6H PRN    aluminum-magnesium hydroxide-simethicone, 30 mL, Oral, Q6H PRN    benzonatate, 100 mg, Oral, TID PRN    benztropine mesylate, 2 mg, Intramuscular, Q8H PRN    hydrOXYzine pamoate, 50 mg, Oral, Q6H PRN    loperamide, 2 mg, Oral, PRN    lorazepam, 2 mg, Intramuscular, Once PRN    LORazepam, 1 mg, Oral, Q6H PRN    nicotine, 1 patch, Transdermal, Daily PRN    OLANZapine, 10 mg, Oral, Q8H PRN **AND** OLANZapine, 10 mg, Intramuscular, Q8H PRN    ondansetron, 4 mg, Oral, Q8H PRN    promethazine, 25 mg, Oral, Q6H PRN        EXAMINATION    VITALS   Vitals:    06/11/25 0731 06/11/25 0932 06/11/25 1913 06/12/25 0715   BP: (!) 137/99  133/86 121/70   BP Location: Left arm      Patient Position: Sitting  Sitting    Pulse: 67  79 80   Resp: 18  18 18   Temp: 96.9 °F (36.1 °C)  98 °F (36.7 °C) 97 °F (36.1 °C)   TempSrc: Tympanic  Temporal    SpO2: 100%  99% 99%   Weight:  60.3 kg (133 lb 0.8 oz)     Height:           Body mass index is 23.57 kg/m².        CONSTITUTIONAL  General Appearance: unremarkable, age  "appropriate    MUSCULOSKELETAL  Muscle Strength and Tone:no tremor, no tic  Abnormal Involuntary Movements: No  Gait and Station: non-ataxic    PSYCHIATRIC   Level of Consciousness: awake, alert , and oriented  Orientation: person, place, time, and situation  Grooming: Hospital garb and Well groomed  Psychomotor Behavior: normal, friendly and cooperative, eye contact normal  Speech: normal tone, normal rate, normal pitch, normal volume  Language: grossly intact  Mood: "off the charts good, so excited" to leave  Affect: Consistent with mood and Full  Thought Process: linear, logical  Associations: intact   Thought Content: denies SI, denies HI, and no delusions  Perceptions: denies AH, denies  VH, no TH, and not RIS  Memory: Able to recall past events, Remote intact, and Recent intact  Attention:Attends to interview without distraction  Fund of Knowledge: Aware of current events and Vocabulary appropriate   Estimate if Intelligence:  Average based on work/education history, vocabulary and mental status exam  Insight: has awareness of illness  Judgment: behavior is adequate to circumstances        DIAGNOSTIC TESTING   Laboratory Results  No results found for this or any previous visit (from the past 24 hours).          MEDICAL DECISION MAKING      ASSESSMENT:     Unspecified mood disorder  SHAISTA with panic attacks  SI  Cannabis abuse  Alcohol abuse     H/o Seizures        PROBLEM LIST AND MANAGEMENT PLANS    Unspecified mood disorder  - increase zoloft 25 to 50 mg PO qd  - pt counseled  - follow up with outpatient mental health providers after discharge for medication management and psychotherapy     Unspecified psychosis  - consider zyprexa   - pt counseled  - follow up with outpatient mental health providers after discharge for medication management and psychotherapy     SHAISTA with panic attacks  - increase zoloft 25 to 50 mg PO qd  - pt counseled  - follow up with outpatient mental health providers after discharge for " medication management and psychotherapy     Suicidal ideations  - continue psychiatric hospitalization  - provide psychotherapeutic interventions and medication management        Cannabis abuse  - consider gabapentin  - pt counseled  - follow up with outpatient mental health providers after discharge for medication management and psychotherapy        Alcohol abuse  - continue valium 10 mg PO TID for detox, will taper as tolerated  -decrease to BID  - start Ativan 1 mg PO q 6 hours PRN for vital sign parameters  - pt counseled  - follow up with outpatient mental health providers after discharge for medication management and psychotherapy     H/o Seizures  - continue keppra 750 mg PO BID  - pt counseled  - consider neurology consult            Discussed diagnosis, risks and benefits of proposed treatment vs alternative treatments vs no treatment, potential side effects of these treatments and the inherent unpredictability of treatment. The patient expresses understanding of the above and displays the capacity to agree with this treatment given said understanding. Patient also agrees that, currently, the benefits outweigh the risks and would like to pursue/continue treatment at this time.    Any medications being used off-label were discussed with the patient inclusive of the evidence base for the use of the medications and consent was obtained for the off-label use of the medication.       DISCHARGE PLANNING  Expected Disposition Plan: Home or Self Care      NEED FOR CONTINUED HOSPITALIZATION  No    Protective inpatient pyschiatric hospitalization required while a safe disposition plan is enacted: Yes    Patient stabilized and ready for discharge from inpatient psychiatric unit: Yes        STAFF:   Georgia Vieira MS3  Psychiatry

## 2025-06-12 NOTE — PROGRESS NOTES
"   06/12/25 1015   [facility] Group Therapy   Group Name Leisure Education   Specific Interventions Cognitive Stimulation Training   Participation Level Appropriate;Attentive;Sharing   Participation Quality Cooperative;Social   Insight/Motivation Applies New Skills;Good   Affect/Mood Display Appropriate   Cognition Alert   Psychomotor WNL     Patient presents pleasant, cooperative, reports a "great" mood. Patient verbalized his discharge plans, "no drugs or liquor, take my medicine the right way." Patient identified coping skills of eating out for enjoyment, going on vacation for relaxation and spending time with family.  "

## 2025-06-12 NOTE — PLAN OF CARE
Mood greatly improved.  Smiling and interacting very well.  Excited about discharge tomorrow.  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  States meds helping him feel less anxious.  Pleasant and polite.  Energetic.  Encouraged increased fluids and regular meals.  VS stable.  Accepted hs snack.  Prn hydroxyzine po given for insomnia at 20:32.  Accepted hs medications.  Stressed compliance with prescribed medications upon discharge.
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
## 2025-06-12 NOTE — PSYCH
"Reed Middleton "Reed" MRN:[id number] ([facility]#[id number]) (25 y.o. M) (Adm: 25)  [facility] MNPGN-[id number]  Patient Demographics    Patient Name  Reed Middleton Legal Sex  Male          Age  2000 (25 y.o.) N  [id number] Address  [address] [address] Phone  [phone] (Home)  [phone] (Mobile) *Preferred*     Patient Demographics    Address  [address] [address] Phone  [phone] (Home)  [phone] (Mobile) *Preferred* E-mail Address  [email or web address]     "Primary Care Provider"  Laly Unger MD "Phone"  [phone]     Emergency Contacts    None on File     Other Contacts    Name Relation Home Work Mobile   En Middleton Father   [phone]   Gudelia Middleton Mother   [phone]     Documents on File     Status Date Received Description   Documents for the Patient   Notice of Privacy Pract Ackn Received 01/28/15    Insurance Documents  ()     Patient ID Received () 10/21/16 [place] 2022   Clinic Authorization Received () 01/28/15    Insurance Documents Received () 10/21/16 [id number]   Clinic Authorization Signed () 10/21/16    Plain Language Summary English No, Patient Declined Print () 10/21/16    [facility] Provider Based Facility Disclosure Signed () 18    Plain Language Summary English No, Patient Declined Print () 18    Insurance Documents Received () 18 [employer] blue   Clinic Authorization Received () 18    External Evaluation Received 18 GENERAL   Plain Language Summary English No, Patient Declined Print () 05/10/18    [facility] Provider Based Facility Disclosure Signed () 05/10/18    [facility] Provider Based Facility Disclosure Signed () 18    Plain Language Summary English Yes, Printed for Patient () 18    Plain Language Summary English No, Patient Declined Print () 18    Plain Language Summary English " No, Patient Declined Print () 18    [facility] Provider Based Facility Disclosure Signed () 18    Plain Language Summary English No, Patient Declined Print () 18    [facility] Provider Based Facility Disclosure Signed () 18    Plain Language Summary English No, Patient Declined Print () 18    Plain Language Summary English Acknowledged () 18    [facility] Contracted Facility Disclosure Signed () 18    Notice of Privacy Pract Ackn JAYLAN Signed 19 Hipaa/aKne,Tynne/Mother   Plain Language Summary English Acknowledged () 18    Plain Language Summary English Acknowledged () 18 fin asst info   Clinic Authorization JAYLAN Signed () 19 Consent/Kane,Tynne/mother   [facility] Signed 19 FD/Kane,Tynne/Mother   Disclosure Agreement Accepted 19 Disclosure/KaneTynne/mother   Plain Language Summary English Acknowledged () 19 fin assist info   Plain Language Summary English Acknowledged () 19    Insurance Documents Received () 19    Plain Language Summary English Acknowledged () 19    Clinic Authorization Signed () 19    [facility] Contracted Facility Disclosure Signed 19    Plain Language Summary English Acknowledged () 19    Plain Language Summary English Acknowledged () 20 fin asst info   Plain Language Summary English Acknowledged () 21    Clinic Authorization Unable to Obtain () 21    Plain Language Summary English Acknowledged () 21    Clinic Authorization Signed () 21    Plain Language Summary English Acknowledged () 21 Fin asst info   Patient ID      Advance Directive Acknowledgement Received 25    Provider Based Acknowledgement      Plain Language Summary English Acknowledged () 24    Insurance  Documents      Plain Language Summary English Acknowledged () 24    Clinic Authorization Received 24    Plain Language Summary English Acknowledged () 25    Plain Language Summary English Acknowledged () 24    DME Prescription Received 10/03/24    Notice of Nondiscrimination and Accessibility Signed 25    Plain Language Summary English Acknowledged () 25    Plain Language Summary English Acknowledged () 25    Plain Language Summary English Acknowledged () 25    Insurance Documents Received 25 This coverage ID card document was provided by [email or web address].   Clinic Authorization Received 25    Plain Language Summary English      [facility] Not Contracted Facility Disclosure      Provider Based Acknowledgement  (Deleted)     Advance Directive Acknowledgement Not Received (Deleted)     Patient Photo   Photo of Patient   Involvement In Care  (Deleted)     [facility] Not Contracted Facility Disclosure  (Deleted)     External Patient Summary   [facility] Patient Care Report   External Radiology and Imaging   MRI Cervical Spine without  Contrast   External Radiology and Imaging   CD UPLOAD---SEE FYI   External [facility]   External Radiology and Imaging   MRI Cervical Spine without  Contrast   External Patient Summary   Hospital Encounter   External [facility] Clinical      Patient Rights and Responsibilities Acknowledged (Deleted) 25    Documents for the Encounter   Medicare Lifetime Reserve Form      Important Medicare Message Printed at Discharge      Advance Beneficiary Notice      Hospital Authorization Received 25    Flowsheets Nursing Received 06/10/25    Flowsheets Nursing Received 06/10/25    Clinical References Attachment   Quitting smoking for adults (English)   Clinical References Attachment   Depression in adults (English)   Clinical References Attachment   Anxiety Discharge Instructions,  Adult (English)   Clinical References Attachment   Suicide Prevention (English)   Clinical References Attachment   Alcohol and your health (English)   Clinical References Attachment   Levetiracetam, ADULT (English)   Clinical References Attachment   Hydroxyzine, ADULT (English)   Clinical References Attachment   Sertraline, ADULT (English)   Clinical References Attachment  (Deleted)  Suicide Prevention (English)   Clinical References Attachment  (Deleted)  Alcohol Intoxication ED (English)     Admission Information    Current Information    Attending Provider Admitting Provider Admission Type Admission Status   Varun Eden MD Blanchard, Michael J., MD Urgent Confirmed Admission          Admission Date/Time Discharge Date Hospital Service Auth/Cert Status   06/08/25  1715  Psychiatry Incomplete          Hospital Area Unit Room/Bed    [facility] UN[facility] 213/213              Admission    Complaint        Hospital Account    Name Acct ID Class Status Primary Coverage   Reed Middleton [id number] IP- Psych Open MEDICAID - [employer]          Guarantor Account (for Hospital Account #[id number])    Name Relation to Pt Service Area Active? Acct Type   Reed Middleton Self [facility] Yes Behavioral Health   Address Phone     [address] [place] [address] [phone](H)            Coverage Information (for Hospital Account #[id number])    F/O Payor/Plan Precert #   MEDICA[employer] [id number]   Subscriber Subscriber #   Reed Middleton [id number]     Address Phone   [address] [address]  [place] [address]

## 2025-06-12 NOTE — PLAN OF CARE
AVS paperwork and personal belongings with patient. Denies SI. Encouraged medication compliance and follow up visit. Escorted to front lobby for discharge.

## 2025-06-12 NOTE — PSYCH
Pt discharge to home with follow-up at [facility]. Appointment has been scheduled on Monday, 06/16/2025 @ 8:30 am. Pt will receive medication management, counseling for behavioral health and smoking cessation. Pt given prescriptions for psychotropic medications and Nicoderm CQ patches 14mg/24hr. AVS faxed 06/12/2025 @ 11:30 am.

## 2025-06-12 NOTE — PSYCH
[facility] discussed with pt on collateral consent. Pt signed collateral consent for milli Middleton [phone]/step mother. [facility] attemtped to contact collateral consent to discuss pt admission. Milli stated:        Reason for admission- describe what happened?     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 While he may occasionally oversee a small group of 5-6 individuals by providing task direction, he does not hold a supervisory nor managerial position within the company. He has no current assignments requiring to travel to [place] or [place], nor is he overseeing any large-scale projects or crews. The company sometimes works with the government, but not often and all the do is put up security barriers.   Milli reports her  and her went on a cruise prior to a recent trip, the patient experienced a seizure, which was followed by heightened anxiety symptoms. As a result, he was brought to the emergency department, where he was prescribed medication to manage his anxiety.  Milli  and her  then departed for a cruise, during which the patients cousin reported bringing him to the hospital on 4-5 separate occasions while they were on vacation.   Milli states the patient had been prescribed 60 tablets for anxiety but reportedly consumed the entire supply within a 4-day period. He misunderstood the dosing instructions and was taking 2-4 pills at a time in an effort to manage his symptoms, which was not advised. The patient independently sought hospital care and presented himself for assistance. The family states they did not instruct him to report suicidal ideation. Milli states they never told him to come to the hospital and tell the doctors he was suicidal.  [place] she will hold his medication and he will not have access to his medications due to the overdose.      Prior treatment places and dates-doctor name  and location  Reason for prior treatment- same or different  How long has patient had problem(s)?    He has been suffering with seizures for 18 years. It is like a cycle that every 5 years he will have a seizure and this is the first time that he has anxiety with it.    Substance abuse- what , how long, how much, how often?    He went to the [facility] in 2020 for substance abuse ( Marijuana) He only did 6 months of the program, but I am not sure as to why he left.     Legal Issues- Current charges, type of offense, probation or parole?    Not to my knowledge    History of suicide attempts- when and what methods, did they require medical attention?   Not to my knowledge    Alcohol Use-  What preference of alcohol, how much, how long, how often?   He has been drinking alcohol for at least 5 years. He will drink a 12 pack a beer through the whole day and sometimes it is a little more. Every now and then he gets the wild hair that he has to do more.  History of violence-describe behaviors and triggers    Not to my knowledge.    Any guns or weapons in the home? If yes, recommend that these be secured.      Not to my knowledge.      What is patients baseline behavior/mood- how well do they know if patient is doing well?    When he is talking to us and doing what he is supposed to do    What helps the patient stay well?  Internal/external coping strategies ( attending meetings, going to groups, taking medications, spending time with family ( etc)?   Taking his medications as prescribed  Discharge plan:    Where will the patient live upon discharge?    He will come to stay with his father and myself      Who else in the home?   Just myself, his grandmother,  and his father.    Will someone be able to pick the patient up when discharged?       I will come to pick him up upon discharge.

## 2025-06-12 NOTE — PSYCH
LPC faxed aftercare referral packet to [facility] for a follow-up appointment. LPC will check status in 30 minutes.

## 2025-06-12 NOTE — PSYCH
"Meditation/Education:  Find Happiness Today : Meditation Exercise  Patient Response:  "Patient attended session               "

## 2025-06-12 NOTE — DISCHARGE SUMMARY
"Discharge Summary  Psychiatry    Admit Date: 6/8/2025    Discharge Date and Time: 06/12/2025 10:57 AM    Attending Physician: Varun Eden MD     Discharge Provider: Eliot Stephens III    Reason for Admission:  CHIEF COMPLAINT   Reed Middleton is a 25 y.o. male with a past psychiatric history of ADHD, seizures, anxiety currently admitted to the inpatient unit with the following chief complaint: substance abuse, thoughts of death/suicide, and overdose    HPI   The patient was seen and examined. The chart was reviewed.     The patient presented to the ER on 6/8/2025 .     The patient was medically cleared and admitted to the U.     Per ED MD:  Chief Complaint  25 y.o. male with Psychiatric Evaluation (Pt to er via aasi states increased anxiety, pt hydroxyzine filled 6/3, bottle now empty. Patient states has been taking "like 10" at a time. Reports SI, denies HI. States was not trying to harm self by taking pills. )     History of Present Illness  Reed Middleton presents to the emergency room with concerns for overdose on hydroxyzine.  Patient states that he has been extremely anxious and depressed and has been trying to take hydroxyzine to help with symptoms.  He knows that he is only supposed to take a maximum of 1 every 6 hours however he has been taking around 10-20 every day.  Patient had a bottle filled 5 days ago with 60 tablets of 50 mg hydroxyzine, which is now empty.  Patient states that he was not trying to kill himself but he does have bad thoughts.  He is very tearful and cooperative on arrival.  Reports that he has been committed 1 time in the past and that he really needs to talk to someone to help him.     Per RN:  The patient is dressed in the blue hospital paper scrubs. The patient is anxious, with an elevated mood. He is animated with an inappropriate laughter and child like mannerisms. The patient stutters and has a speech impediment. The patient reported " I had a seizure " "three weeks ago and I am nervous ever since then. I have been going to the doctor to get a pill to help me but it doesn't work". Patient appears to be developmentally delayed for age and a poor historian. He reported he went to the doctor to get a pill for anxiety. They gave him Hydroxyzine 50 MG and told him to take this medicine "one pill every 6 hours whenever needed "so he took one pill every time he needed for feeling of anxiousness. He reported "I just kept taking a pill when I felt anxious and then I did not have any more pills left so I came back to the ER". This patient did not comprehend the medication was ordered as one pill every six hours and needed. The patient reported "This was not a suicide attempt.  I did not want to kill myself. I just took too much medicine. The doctor said whenever needed so I kept taking the medicine". The patient was very animated and laughing during our interview. He stated "If you are not the  I can tell you I smoke 10-20 joints a day and I drink 12 big bottles of beer a day". The patient has bilateral hand tremors. CIWA score 10.The patient reports his last seizure was three weeks ago. He also stated he was not taking his prescribed medication of Keppra.         Psychiatric interview:  Rojas has been having high anxiety lately, "ever since I had the seizure," 3 weeks ago, "I felt like crap," so he went to the hospital, "still having anxiety attacks." Reports "I had no depression at all, I have a great job, I'm scared to die." Reports he was taking high doses of hydroxyzine due to his anxiety level, "I wasn't taking correctly at all, but I wasn't trying to overdose, I just didn't know who to use it." Reports has been drinking 12 pack of beer daily and smoking 2-3 joints daily. Reports "I felt like I was going to die, and I didn't want to do something that would make me die... I was shaking and hurting, so I called 911, I needed help or I would do something bad, like " "try to kill myself in the moment."          Procedures Performed: * No surgery found *    Hospital Course:    Patient was admitted to the inpatient psychiatry unit after being medically cleared in the ED. Chart and labs were reviewed. The patient was stabilized as follows:      Unspecified mood disorder  - increase zoloft 25 to 50 mg PO qd  - pt counseled  - follow up with outpatient mental health providers after discharge for medication management and psychotherapy     Unspecified psychosis  - consider zyprexa   - pt counseled  - follow up with outpatient mental health providers after discharge for medication management and psychotherapy     SHAISTA with panic attacks  - increase zoloft 25 to 50 mg PO qd  - pt counseled  - follow up with outpatient mental health providers after discharge for medication management and psychotherapy     Suicidal ideations  - continue psychiatric hospitalization  - provide psychotherapeutic interventions and medication management        Cannabis abuse  - consider gabapentin  - pt counseled  - follow up with outpatient mental health providers after discharge for medication management and psychotherapy        Alcohol abuse  - continue valium 10 mg PO TID for detox, will taper as tolerated  -decrease to BID  - start Ativan 1 mg PO q 6 hours PRN for vital sign parameters  - pt counseled  - follow up with outpatient mental health providers after discharge for medication management and psychotherapy     H/o Seizures  - continue keppra 750 mg PO BID  - pt counseled  - consider neurology consult                 The patient reports improved symptoms as documented. The patient is requesting discharge.The patient is hopeful, future oriented and goal directed. The patient readily discusses both short and long term goals. The patient can identify positive coping skills and social support. AIMS score was 0. During hospitalization, the patient was encouraged to go to both groups and individual " counseling. Patient was monitored for any side effects. A meeting was held with multidisciplinary team prior to discharge and pt's diagnosis, current medications, and follow up were discussed. The patient has been compliant with treatment and can adequately attend to activities of daily living in an independent manner. The patient denies any side effects. The patient denies SI, HI, plan or intent for self harm or harm to others. The patient is no longer a danger to self or others nor gravely disabled disabled. Patient discharged  in stable condition with scheduled outpatient follow up.      Discussed diagnosis, risks and benefits of proposed treatment vs alternative treatments vs no treatment, and potential side effects of these treatments.  The patient expresses understanding of the above and displays the capacity to agree with this treatment given said understanding.  Patient also agrees that, currently, the benefits outweigh the risks and would like to pursue treatment at this time.      Discharge MSE: stated age, casually dressed, well groomed.  No psychomotor agitation or retardation.  No abnormal involuntary movements.  Gait normal.  Speech normal, conversational.  Language fluent English. Mood fine.  Affect normal range, pleasant, euthymic.  Thought process linear.  Associations intact.  Denies suicidal or homicidal ideation.  Denies auditory hallucinations, paranoid ideation, ideas of reference.  Memory intact.  Attention intact.  Fund of knowledge intact.  Insight intact.  Judgment intact.  Alert and oriented to person, place, time.      Tobacco Usage:  Is patient a smoker? No  Does patient want prescription for Tobacco Cessation? No  Does patient want counseling for Tobacco Cessation? No    If patient would like to quit, then over the counter nicotine patch could be used. The patient could also follow up with his PCP or psychiatric provider for other alternatives.     Final Diagnoses:    Principal Problem:  Unspecified mood disorder   Secondary Diagnoses:     SHAISTA with panic attacks  SI  Cannabis abuse  Alcohol abuse     H/o Seizures    Labs:  Admission on 06/08/2025   Component Date Value Ref Range Status    Hemoglobin A1c 06/09/2025 5.0  4.0 - 5.6 % Final    Estimated Average Glucose 06/09/2025 97  68 - 131 mg/dL Final    Cholesterol Total 06/09/2025 152  120 - 199 mg/dL Final    Triglyceride 06/09/2025 210 (H)  30 - 150 mg/dL Final    HDL Cholesterol 06/09/2025 61  40 - 75 mg/dL Final    LDL Cholesterol 06/09/2025 49.0 (L)  63.0 - 159.0 mg/dL Final    HDL/Cholesterol Ratio 06/09/2025 40.1  20.0 - 50.0 % Final    Cholesterol/HDL Ratio 06/09/2025 2.5  2.0 - 5.0 Final    Non HDL Cholesterol 06/09/2025 91  mg/dL Final   Admission on 06/08/2025, Discharged on 06/08/2025   Component Date Value Ref Range Status    QRS Duration 06/08/2025 98  ms Final    [facility]HS QTC Calculation 06/08/2025 465  ms Final    Alcohol, Serum 06/08/2025 <10  <10 mg/dL Final    Sodium 06/08/2025 135 (L)  136 - 145 mmol/L Final    Potassium 06/08/2025 3.7  3.5 - 5.1 mmol/L Final    Chloride 06/08/2025 106  95 - 110 mmol/L Final    CO2 06/08/2025 19 (L)  23 - 29 mmol/L Final    Glucose 06/08/2025 98  70 - 110 mg/dL Final    BUN 06/08/2025 12  6 - 20 mg/dL Final    Creatinine 06/08/2025 0.8  0.5 - 1.4 mg/dL Final    Calcium 06/08/2025 9.8  8.7 - 10.5 mg/dL Final    Protein Total 06/08/2025 7.8  6.0 - 8.4 gm/dL Final    Albumin 06/08/2025 4.6  3.5 - 5.2 g/dL Final    Bilirubin Total 06/08/2025 1.0  0.1 - 1.0 mg/dL Final    ALP 06/08/2025 66  40 - 150 unit/L Final    AST 06/08/2025 48 (H)  11 - 45 unit/L Final    ALT 06/08/2025 33  10 - 44 unit/L Final    Anion Gap 06/08/2025 10  8 - 16 mmol/L Final    eGFR 06/08/2025 >60  >60 mL/min/1.73/m2 Final    Color, UA 06/08/2025 Colorless (A)  Straw, Radha, Yellow, Light-Orange Final    Appearance, UA 06/08/2025 Clear  Clear Final    pH, UA 06/08/2025 7.0  5.0 - 8.0 Final    Spec Grav UA 06/08/2025 <=1.005 (A)   1.005 - 1.030 Final    Protein, UA 06/08/2025 Negative  Negative Final    Glucose, UA 06/08/2025 Negative  Negative Final    Ketones, UA 06/08/2025 Negative  Negative Final    Bilirubin, UA 06/08/2025 Negative  Negative Final    Blood, UA 06/08/2025 Trace (A)  Negative Final    Nitrites, UA 06/08/2025 Negative  Negative Final    Urobilinogen, UA 06/08/2025 Negative  <2.0 EU/dL Final    Leukocyte Esterase, UA 06/08/2025 Negative  Negative Final    Benzodiazepine, Urine 06/08/2025 Negative  Negative Final    Methadone, Urine 06/08/2025 Negative  Negative Final    Cocaine, Urine 06/08/2025 Negative  Negative Final    Opiates, Urine 06/08/2025 Negative  Negative Final    Barbiturates, Urine 06/08/2025 Negative  Negative Final    Amphetamines, Urine 06/08/2025 Negative  Negative Final    THC 06/08/2025 Presumptive Positive (A)  Negative Final    Phencyclidine, Urine 06/08/2025 Negative  Negative Final    Urine Creatinine 06/08/2025 5.6 (L)  23.0 - 375.0 mg/dL Final    Acetaminophen Level 06/08/2025 <3.0 (L)  10.0 - 20.0 ug/ml Final    WBC 06/08/2025 5.69  3.90 - 12.70 K/uL Final    RBC 06/08/2025 4.80  4.60 - 6.20 M/uL Final    HGB 06/08/2025 15.2  14.0 - 18.0 gm/dL Final    HCT 06/08/2025 42.2  40.0 - 54.0 % Final    MCV 06/08/2025 88  82 - 98 fL Final    MCH 06/08/2025 31.7 (H)  27.0 - 31.0 pg Final    MCHC 06/08/2025 36.0  32.0 - 36.0 g/dL Final    RDW 06/08/2025 11.7  11.5 - 14.5 % Final    Platelet Count 06/08/2025 276  150 - 450 K/uL Final    MPV 06/08/2025 10.4  9.2 - 12.9 fL Final    Nucleated RBC 06/08/2025 0  <=0 /100 WBC Final    Neut % 06/08/2025 54.0  38 - 73 % Final    Lymph % 06/08/2025 33.4  18 - 48 % Final    Mono % 06/08/2025 10.0  4 - 15 % Final    Eos % 06/08/2025 1.2  <=8 % Final    Basophil % 06/08/2025 0.7  <=1.9 % Final    Imm Grans % 06/08/2025 0.7 (H)  0.0 - 0.5 % Final    Neut # 06/08/2025 3.07  1.8 - 7.7 K/uL Final    Lymph # 06/08/2025 1.90  1 - 4.8 K/uL Final    Mono # 06/08/2025 0.57  0.3 -  1 K/uL Final    Eos # 06/08/2025 0.07  <=0.5 K/uL Final    Baso # 06/08/2025 0.04  <=0.2 K/uL Final    Imm Grans # 06/08/2025 0.04  0.00 - 0.04 K/uL Final    Magnesium  06/08/2025 1.5 (L)  1.6 - 2.6 mg/dL Final    Extra Tube 06/08/2025 Hold for add-ons.   Final   Admission on 06/02/2025, Discharged on 06/02/2025   Component Date Value Ref Range Status    QRS Duration 06/02/2025 82  ms Final    [facility]HS QTC Calculation 06/02/2025 464  ms Final    Sodium 06/02/2025 135 (L)  136 - 145 mmol/L Final    Potassium 06/02/2025 4.0  3.5 - 5.1 mmol/L Final    Chloride 06/02/2025 105  95 - 110 mmol/L Final    CO2 06/02/2025 19 (L)  23 - 29 mmol/L Final    Glucose 06/02/2025 94  70 - 110 mg/dL Final    BUN 06/02/2025 14  6 - 20 mg/dL Final    Creatinine 06/02/2025 0.8  0.5 - 1.4 mg/dL Final    Calcium 06/02/2025 9.2  8.7 - 10.5 mg/dL Final    Protein Total 06/02/2025 7.1  6.0 - 8.4 gm/dL Final    Albumin 06/02/2025 4.3  3.5 - 5.2 g/dL Final    Bilirubin Total 06/02/2025 2.0 (H)  0.1 - 1.0 mg/dL Final    ALP 06/02/2025 64  40 - 150 unit/L Final    AST 06/02/2025 46 (H)  11 - 45 unit/L Final    ALT 06/02/2025 45 (H)  10 - 44 unit/L Final    Anion Gap 06/02/2025 11  8 - 16 mmol/L Final    eGFR 06/02/2025 >60  >60 mL/min/1.73/m2 Final    Troponin-I 06/02/2025 <0.006  <=0.026 ng/mL Final    CPK 06/02/2025 125  20 - 200 U/L Final    Alcohol, Serum 06/02/2025 <10  <10 mg/dL Final    Benzodiazepine, Urine 06/02/2025 Negative  Negative Final    Methadone, Urine 06/02/2025 Negative  Negative Final    Cocaine, Urine 06/02/2025 Negative  Negative Final    Opiates, Urine 06/02/2025 Negative  Negative Final    Barbiturates, Urine 06/02/2025 Negative  Negative Final    Amphetamines, Urine 06/02/2025 Negative  Negative Final    THC 06/02/2025 Presumptive Positive (A)  Negative Final    Phencyclidine, Urine 06/02/2025 Negative  Negative Final    Urine Creatinine 06/02/2025 52.2  23.0 - 375.0 mg/dL Final    WBC 06/02/2025 6.54  3.90 - 12.70 K/uL Final     RBC 06/02/2025 4.91  4.60 - 6.20 M/uL Final    HGB 06/02/2025 15.6  14.0 - 18.0 gm/dL Final    HCT 06/02/2025 43.1  40.0 - 54.0 % Final    MCV 06/02/2025 88  82 - 98 fL Final    MCH 06/02/2025 31.8 (H)  27.0 - 31.0 pg Final    MCHC 06/02/2025 36.2 (H)  32.0 - 36.0 g/dL Final    RDW 06/02/2025 11.9  11.5 - 14.5 % Final    Platelet Count 06/02/2025 285  150 - 450 K/uL Final    MPV 06/02/2025 10.5  9.2 - 12.9 fL Final    Nucleated RBC 06/02/2025 0  <=0 /100 WBC Final    Neut % 06/02/2025 54.2  38 - 73 % Final    Lymph % 06/02/2025 36.9  18 - 48 % Final    Mono % 06/02/2025 7.2  4 - 15 % Final    Eos % 06/02/2025 0.9  <=8 % Final    Basophil % 06/02/2025 0.6  <=1.9 % Final    Imm Grans % 06/02/2025 0.2  0.0 - 0.5 % Final    Neut # 06/02/2025 3.55  1.8 - 7.7 K/uL Final    Lymph # 06/02/2025 2.41  1 - 4.8 K/uL Final    Mono # 06/02/2025 0.47  0.3 - 1 K/uL Final    Eos # 06/02/2025 0.06  <=0.5 K/uL Final    Baso # 06/02/2025 0.04  <=0.2 K/uL Final    Imm Grans # 06/02/2025 0.01  0.00 - 0.04 K/uL Final   Admission on 05/31/2025, Discharged on 05/31/2025   Component Date Value Ref Range Status    Acetaminophen Level 05/31/2025 <3.0 (L)  10.0 - 20.0 ug/ml Final    Color, UA 05/31/2025 Yellow  Straw, Radha, Yellow, Light-Orange Final    Appearance, UA 05/31/2025 Clear  Clear Final    pH, UA 05/31/2025 6.0  5.0 - 8.0 Final    Spec Grav UA 05/31/2025 <=1.005 (A)  1.005 - 1.030 Final    Protein, UA 05/31/2025 Negative  Negative Final    Glucose, UA 05/31/2025 Negative  Negative Final    Ketones, UA 05/31/2025 Negative  Negative Final    Bilirubin, UA 05/31/2025 Negative  Negative Final    Blood, UA 05/31/2025 Negative  Negative Final    Nitrites, UA 05/31/2025 Negative  Negative Final    Urobilinogen, UA 05/31/2025 Negative  <2.0 EU/dL Final    Leukocyte Esterase, UA 05/31/2025 Negative  Negative Final    Benzodiazepine, Urine 05/31/2025 Negative  Negative Final    Methadone, Urine 05/31/2025 Negative  Negative Final     Cocaine, Urine 05/31/2025 Negative  Negative Final    Opiates, Urine 05/31/2025 Negative  Negative Final    Barbiturates, Urine 05/31/2025 Negative  Negative Final    Amphetamines, Urine 05/31/2025 Negative  Negative Final    THC 05/31/2025 Presumptive Positive (A)  Negative Final    Phencyclidine, Urine 05/31/2025 Negative  Negative Final    Urine Creatinine 05/31/2025 24.8  23.0 - 375.0 mg/dL Final    Salicylate Level 05/31/2025 <5.0 (L)  15.0 - 30.0 mg/dL Final    Alcohol, Serum 05/31/2025 <10  <10 mg/dL Final    TSH 05/31/2025 1.552  0.400 - 4.000 uIU/mL Final    QRS Duration 05/31/2025 88  ms Final    OHS QTC Calculation 05/31/2025 439  ms Final    Troponin-I 05/31/2025 <0.006  <=0.026 ng/mL Final    CPK 05/31/2025 121  20 - 200 U/L Final    Extra Tube 05/31/2025 Hold for add-ons.   Final   Admission on 05/20/2025, Discharged on 05/20/2025   Component Date Value Ref Range Status    QRS Duration 05/20/2025 86  ms Final    OHS QTC Calculation 05/20/2025 446  ms Final    Sodium 05/20/2025 138  136 - 145 mmol/L Final    Potassium 05/20/2025 3.6  3.5 - 5.1 mmol/L Final    Chloride 05/20/2025 104  95 - 110 mmol/L Final    CO2 05/20/2025 22 (L)  23 - 29 mmol/L Final    Glucose 05/20/2025 116 (H)  70 - 110 mg/dL Final    BUN 05/20/2025 4 (L)  6 - 20 mg/dL Final    Creatinine 05/20/2025 0.7  0.5 - 1.4 mg/dL Final    Calcium 05/20/2025 9.8  8.7 - 10.5 mg/dL Final    Protein Total 05/20/2025 7.8  6.0 - 8.4 gm/dL Final    Albumin 05/20/2025 4.3  3.5 - 5.2 g/dL Final    Bilirubin Total 05/20/2025 1.5 (H)  0.1 - 1.0 mg/dL Final    ALP 05/20/2025 64  40 - 150 unit/L Final    AST 05/20/2025 42  11 - 45 unit/L Final    ALT 05/20/2025 41  10 - 44 unit/L Final    Anion Gap 05/20/2025 12  8 - 16 mmol/L Final    eGFR 05/20/2025 >60  >60 mL/min/1.73/m2 Final    Troponin-I 05/20/2025 <0.006  <=0.026 ng/mL Final    BNP 05/20/2025 17  0 - 99 pg/mL Final    D-Dimer 05/20/2025 <0.19  <0.50 mg/L FEU Final    Color, UA 05/20/2025 Yellow   Straw, Radha, Yellow, Light-Orange Final    Appearance, UA 05/20/2025 Clear  Clear Final    pH, UA 05/20/2025 7.0  5.0 - 8.0 Final    Spec Grav UA 05/20/2025 <=1.005 (A)  1.005 - 1.030 Final    Protein, UA 05/20/2025 Negative  Negative Final    Glucose, UA 05/20/2025 Negative  Negative Final    Ketones, UA 05/20/2025 Negative  Negative Final    Bilirubin, UA 05/20/2025 Negative  Negative Final    Blood, UA 05/20/2025 Negative  Negative Final    Nitrites, UA 05/20/2025 Negative  Negative Final    Urobilinogen, UA 05/20/2025 Negative  <2.0 EU/dL Final    Leukocyte Esterase, UA 05/20/2025 Negative  Negative Final    Benzodiazepine, Urine 05/20/2025 Negative  Negative Final    Methadone, Urine 05/20/2025 Negative  Negative Final    Cocaine, Urine 05/20/2025 Negative  Negative Final    Opiates, Urine 05/20/2025 Negative  Negative Final    Barbiturates, Urine 05/20/2025 Negative  Negative Final    Amphetamines, Urine 05/20/2025 Negative  Negative Final    THC 05/20/2025 Presumptive Positive (A)  Negative Final    Phencyclidine, Urine 05/20/2025 Negative  Negative Final    Urine Creatinine 05/20/2025 29.4  23.0 - 375.0 mg/dL Final    WBC 05/20/2025 8.07  3.90 - 12.70 K/uL Final    RBC 05/20/2025 4.85  4.60 - 6.20 M/uL Final    HGB 05/20/2025 15.6  14.0 - 18.0 gm/dL Final    HCT 05/20/2025 43.6  40.0 - 54.0 % Final    MCV 05/20/2025 90  82 - 98 fL Final    MCH 05/20/2025 32.2 (H)  27.0 - 31.0 pg Final    MCHC 05/20/2025 35.8  32.0 - 36.0 g/dL Final    RDW 05/20/2025 11.9  11.5 - 14.5 % Final    Platelet Count 05/20/2025 245  150 - 450 K/uL Final    MPV 05/20/2025 10.9  9.2 - 12.9 fL Final    Nucleated RBC 05/20/2025 0  <=0 /100 WBC Final    Neut % 05/20/2025 71.7  38 - 73 % Final    Lymph % 05/20/2025 21.8  18 - 48 % Final    Mono % 05/20/2025 5.1  4 - 15 % Final    Eos % 05/20/2025 0.6  <=8 % Final    Basophil % 05/20/2025 0.4  <=1.9 % Final    Imm Grans % 05/20/2025 0.4  0.0 - 0.5 % Final    Neut # 05/20/2025 5.79  1.8 -  7.7 K/uL Final    Lymph # 05/20/2025 1.76  1 - 4.8 K/uL Final    Mono # 05/20/2025 0.41  0.3 - 1 K/uL Final    Eos # 05/20/2025 0.05  <=0.5 K/uL Final    Baso # 05/20/2025 0.03  <=0.2 K/uL Final    Imm Grans # 05/20/2025 0.03  0.00 - 0.04 K/uL Final    Extra Tube 05/20/2025 Hold for add-ons.   Final         Discharged Condition: stable and improved; not currently a danger to self/others or gravely disabled    Disposition: Home or Self Care, pt refused rehab    Is patient being discharged on multiple neuroleptics? No    Follow Up/Patient Instructions:     Take all medications as prescribed.  Attend all psychiatric and medical follow up appointments.   Abstain from all drugs and alcohol.  Call the crisis line at: [phone] for help in a crisis and emergent situations or call 911 and Return to ED for any acute worsening of your condition including suicidal or homicidal ideations      Discharge Procedure Orders   Diet Adult Regular     Notify your health care provider if you experience any of the following:  temperature >100.4     Notify your health care provider if you experience any of the following:  persistent nausea and vomiting or diarrhea     Notify your health care provider if you experience any of the following:   Order Comments: Suicidal thoughts, homicidal thoughts, or any other changes in mental status  If you would like immediate help/crisis counseling, please call [phone] (TALK). Through this toll-free phone number for a network of crisis centers across the country. These centers staff their lines with people who are trained to listen and offer support to people in emotional crisis. If you are in an emergency, please call 911.     Notify your health care provider if you experience any of the following:  persistent dizziness, light-headedness, or visual disturbances     Notify your health care provider if you experience any of the following:  increased confusion or weakness     Reason for not  Prescribing Nicotine Replacement     Order Specific Question Answer Comments   Reason for not Prescribing: Patient is not a tobacco user      Activity as tolerated           Follow up apt: staff will schedule      Medications:  Reconciled Home Medications:      Medication List        START taking these medications      levETIRAcetam 750 MG Tab  Commonly known as: KEPPRA  Take 1 tablet (750 mg total) by mouth 2 (two) times daily.  Replaces: levetiracetam  mg Tb24 tablet            CHANGE how you take these medications      sertraline 50 MG tablet  Commonly known as: ZOLOFT  Take 1 tablet (50 mg total) by mouth once daily.  Start taking on: June 13, 2025  What changed:   medication strength  how much to take            STOP taking these medications      hydrOXYzine pamoate 50 MG Cap  Commonly known as: VISTARIL     levetiracetam  mg Tb24 tablet  Commonly known as: KEPPRA XR  Replaced by: levETIRAcetam 750 MG Tab                Diet: regular     Activity as tolerated    Total time spent discharging patient: 34 minutes    Eliot Stephens III, MD  Psychiatry

## 2025-06-12 NOTE — DISCHARGE INSTRUCTIONS
Patient AVS teaching and discharge instructions reviewed and given to patient.  Verbalized understanding. Encouraged medication compliance and follow up visit. Denies suicidal ideation. In good mood and spirits. Belongings returned with verification noted.

## 2025-06-12 NOTE — NURSING
Pt sleeping at this time, slept 8 hrs with no awakenings. NAD. Resp even and unlabored.Pathways clear,bed in low position. Q 15 min safety check ongoing.All precautions maintained.

## 2025-06-12 NOTE — PSYCH
Pt's follow-up appointment has been scheduled at [facility] on Monday, 06/16/2025 @ 8:30 pm. Follow-up note entered.

## 2025-06-12 NOTE — PSYCH
Latest Reference Range & Units 06/08/25 13:57   WBC 3.90 - 12.70 K/uL 5.69   RBC 4.60 - 6.20 M/uL 4.80   Hemoglobin 14.0 - 18.0 gm/dL 15.2   Hematocrit 40.0 - 54.0 % 42.2   MCV 82 - 98 fL 88   MCH 27.0 - 31.0 pg 31.7 (H)   MCHC 32.0 - 36.0 g/dL 36.0   RDW 11.5 - 14.5 % 11.7   Platelet Count 150 - 450 K/uL 276   MPV 9.2 - 12.9 fL 10.4   Neut % 38 - 73 % 54.0   Lymph % 18 - 48 % 33.4   Mono % 4 - 15 % 10.0   Eos % <=8 % 1.2   Basophil % <=1.9 % 0.7   Immature Granulocytes 0.0 - 0.5 % 0.7 (H)   Gran # (ANC) 1.8 - 7.7 K/uL 3.07   Lymph # 1 - 4.8 K/uL 1.90   Mono # 0.3 - 1 K/uL 0.57   Eos # <=0.5 K/uL 0.07   Baso # <=0.2 K/uL 0.04   Immature Grans (Abs) 0.00 - 0.04 K/uL 0.04   nRBC <=0 /100 WBC 0   (H): Data is abnormally high     Latest Reference Range & Units 06/08/25 13:41   Benzodiazepine, Urine Negative  Negative   Phencyclidine Negative  Negative   Cocaine, Urine Negative  Negative   Opiates, Urine Negative  Negative   Barbituates, Urine Negative  Negative   Amphetamines, Urine Negative  Negative   Marijuana (THC) Metabolite Negative  Presumptive Positive !   Methadone Screen, Urine Negative  Negative   !: Data is abnormal     Latest Reference Range & Units 06/08/25 13:57   Alcohol, Serum <10 mg/dL <10   Acetaminophen Level 10.0 - 20.0 ug/ml <3.0 (L)   (L): Data is abnormally low     Latest Reference Range & Units 06/08/25 13:41   Color, UA Straw, Radha, Yellow, Light-Orange  Colorless !   Appearance, UA Clear  Clear   Spec Grav UA 1.005 - 1.030  <=1.005 !   pH, UA 5.0 - 8.0  7.0   Protein, UA Negative  Negative   Glucose, UA Negative  Negative   Ketones, UA Negative  Negative   Blood, UA Negative  Trace !   NITRITE UA Negative  Negative   Bilirubin (UA) Negative  Negative   Urobilinogen, UA <2.0 EU/dL Negative   Leukocyte Esterase, UA Negative  Negative   Urine Creatinine 23.0 - 375.0 mg/dL 5.6 (L)   !: Data is abnormal  (L): Data is abnormally low

## 2025-06-17 ENCOUNTER — CLINICAL SUPPORT (OUTPATIENT)
Dept: FAMILY MEDICINE | Facility: CLINIC | Age: 25
End: 2025-06-17
Payer: MEDICAID

## 2025-06-17 ENCOUNTER — OFFICE VISIT (OUTPATIENT)
Dept: FAMILY MEDICINE | Facility: CLINIC | Age: 25
End: 2025-06-17
Payer: MEDICAID

## 2025-06-17 VITALS
BODY MASS INDEX: 24.34 KG/M2 | SYSTOLIC BLOOD PRESSURE: 124 MMHG | OXYGEN SATURATION: 99 % | WEIGHT: 137.38 LBS | RESPIRATION RATE: 16 BRPM | HEIGHT: 63 IN | DIASTOLIC BLOOD PRESSURE: 72 MMHG | HEART RATE: 80 BPM

## 2025-06-17 DIAGNOSIS — R53.83 FATIGUE, UNSPECIFIED TYPE: ICD-10-CM

## 2025-06-17 DIAGNOSIS — Z51.81 MEDICATION MONITORING ENCOUNTER: ICD-10-CM

## 2025-06-17 DIAGNOSIS — Z51.81 MEDICATION MONITORING ENCOUNTER: Primary | ICD-10-CM

## 2025-06-17 LAB — MAGNESIUM SERPL-MCNC: 1.8 MG/DL (ref 1.6–2.6)

## 2025-06-17 PROCEDURE — 83735 ASSAY OF MAGNESIUM: CPT

## 2025-06-17 PROCEDURE — 36415 COLL VENOUS BLD VENIPUNCTURE: CPT | Mod: PBBFAC

## 2025-06-17 PROCEDURE — 1159F MED LIST DOCD IN RCRD: CPT | Mod: CPTII,,, | Performed by: FAMILY MEDICINE

## 2025-06-17 PROCEDURE — 99999 PR PBB SHADOW E&M-EST. PATIENT-LVL III: CPT | Mod: PBBFAC,,, | Performed by: FAMILY MEDICINE

## 2025-06-17 PROCEDURE — 99213 OFFICE O/P EST LOW 20 MIN: CPT | Mod: PBBFAC | Performed by: FAMILY MEDICINE

## 2025-06-17 PROCEDURE — 99999PBSHW PR PBB SHADOW TECHNICAL ONLY FILED TO HB: Mod: PBBFAC,,,

## 2025-06-17 PROCEDURE — 1111F DSCHRG MED/CURRENT MED MERGE: CPT | Mod: CPTII,,, | Performed by: FAMILY MEDICINE

## 2025-06-17 PROCEDURE — 3074F SYST BP LT 130 MM HG: CPT | Mod: CPTII,,, | Performed by: FAMILY MEDICINE

## 2025-06-17 PROCEDURE — 3078F DIAST BP <80 MM HG: CPT | Mod: CPTII,,, | Performed by: FAMILY MEDICINE

## 2025-06-17 PROCEDURE — 99213 OFFICE O/P EST LOW 20 MIN: CPT | Mod: S$PBB,,, | Performed by: FAMILY MEDICINE

## 2025-06-17 PROCEDURE — 80177 DRUG SCRN QUAN LEVETIRACETAM: CPT

## 2025-06-17 PROCEDURE — 3044F HG A1C LEVEL LT 7.0%: CPT | Mod: CPTII,,, | Performed by: FAMILY MEDICINE

## 2025-06-17 RX ORDER — SERTRALINE HYDROCHLORIDE 50 MG/1
50 TABLET, FILM COATED ORAL DAILY
Qty: 30 TABLET | Refills: 0 | Status: SHIPPED | OUTPATIENT
Start: 2025-06-17 | End: 2026-06-17

## 2025-06-17 RX ORDER — OLANZAPINE 2.5 MG/1
2.5 TABLET, FILM COATED ORAL NIGHTLY
Qty: 30 TABLET | Refills: 11 | Status: SHIPPED | OUTPATIENT
Start: 2025-06-17 | End: 2026-06-17

## 2025-06-17 NOTE — PROGRESS NOTES
Subjective:       Patient ID: Reed Middleton is a 25 y.o. male.    Chief Complaint: Follow-up (2 week follow up )    History of Present Illness    Patient presents today for follow up after recent seizure and anxiety He has an 18-year history of seizures with prolonged periods of remission lasting 4-5 years between episodes. He experienced a seizure 3-4 weeks ago with return to baseline within 2 hours. Laboratory studies revealed low magnesium levels which may have contributed to this recent episode. He developed severe anxiety 1.5 days following the recent seizure, describing uncontrollable body sensations. This is his first instance of anxiety reaching this level of severity in his 18-year history of seizure disorder. He currently experiences persistent shakiness and nervousness. He was hospitalized for 4 days last Sunday following ingestion of 60 hydroxyzine pills over a 4-day period. He is currently taking Zoloft prescribed by Kathe Varghese for seizure management. He uses marijuana and reports that overuse can increase his anxiety symptoms.          Objective:          Physical Exam  Vitals and nursing note reviewed.   Constitutional:       General: He is not in acute distress.     Appearance: He is well-developed.   HENT:      Head: Normocephalic and atraumatic.   Eyes:      Conjunctiva/sclera: Conjunctivae normal.      Pupils: Pupils are equal, round, and reactive to light.   Neck:      Thyroid: No thyromegaly.   Cardiovascular:      Rate and Rhythm: Normal rate and regular rhythm.      Heart sounds: Normal heart sounds.   Pulmonary:      Effort: Pulmonary effort is normal. No respiratory distress.      Breath sounds: Normal breath sounds. No wheezing.   Abdominal:      General: Bowel sounds are normal.      Palpations: Abdomen is soft.      Tenderness: There is no abdominal tenderness.   Musculoskeletal:         General: Normal range of motion.      Cervical back: Normal range of motion and  neck supple.   Lymphadenopathy:      Cervical: No cervical adenopathy.   Skin:     General: Skin is warm and dry.      Findings: No rash.   Neurological:      Mental Status: He is alert and oriented to person, place, and time.      Comments: shakiness   Psychiatric:         Behavior: Behavior normal.      Comments: Delayed mentation         Assessment:           1. Medication monitoring encounter    2. Fatigue, unspecified type        Plan:     Assessment & Plan    ## EPILEPSY MANAGEMENT:  - Monitored the patient's seizure activity, noting a seizure occurred 3-4 weeks ago with recovery within 2 hours.  - Patient has been suffering from seizures for 18 years, with remission periods lasting 4-5 years.  - Ordered Keppra level labs to ensure appropriate dosing and prescribed Keppra for seizure management with potential plan to decrease the dose after checking levels.  - Instructed the patient to follow up with neurologist on the 27th as scheduled.  - Explained that stress, lack of sleep, and marijuana use can lower seizure threshold.    ## HYPOMAGNESEMIA:  - Evaluated the patient's magnesium levels, which were significantly low during hospital visit following seizure and could potentially trigger seizures, causing sluggishness and muscle cramping.  - Ordered magnesium level labs for continued monitoring and prescribed magnesium supplementation to be taken at night before sleep.  - Planned to recheck magnesium levels at follow-up.    ## GENERALIZED ANXIETY DISORDER:  - Monitored the patient's anxiety symptoms, including nervousness and shakiness, which are exacerbated by stress and lack of sleep.  - Prescribed Zyprexa to be taken at night before bed with magnesium supplement for anxiety management.  - Referred the patient to psychiatrist for medication management.  - Considered Gabapentin as an alternative to marijuana for anxiety management.    ## MEDICATION OVERDOSE MANAGEMENT:  - Monitored the patient's history of  "taking 60 pills of hydroxyzine within a 4-day period, which led to hospitalization.  - Planned to adjust medications and monitor for side effects.  - Instructed the patient to contact the office if experiencing adverse effects.    ## CANNABIS USE MANAGEMENT:  - Monitored the patient's marijuana use, which is being used to manage anxiety.  - Patient acknowledges that while marijuana can alleviate anxiety, overuse can exacerbate it.  - Explained how marijuana use can lower seizure threshold, potentially contributing to seizure activity.         Reed Joyce" was seen today for follow-up.    Diagnoses and all orders for this visit:    Medication monitoring encounter  -     Levetiracetam Level; Future  -     Magnesium; Future    Fatigue, unspecified type  -     Levetiracetam Level; Future  -     Magnesium; Future    Other orders  -     sertraline (ZOLOFT) 50 MG tablet; Take 1 tablet (50 mg total) by mouth once daily.  -     OLANZapine (ZYPREXA) 2.5 MG tablet; Take 1 tablet (2.5 mg total) by mouth every evening.          RTC if condition acutely worsens or any other concerns, otherwise RTC as scheduled      This note was generated with the assistance of ambient listening technology. Verbal consent was obtained by the patient and accompanying visitor(s) for the recording of patient appointment to facilitate this note. I attest to having reviewed and edited the generated note for accuracy, though some syntax or spelling errors may persist. Please contact the author of this note for any clarification.   .deep    "

## 2025-06-20 ENCOUNTER — RESULTS FOLLOW-UP (OUTPATIENT)
Dept: PEDIATRICS | Facility: HOSPITAL | Age: 25
End: 2025-06-20

## 2025-06-20 LAB — W LEVETIRACETAM: 26.3 UG/ML

## 2025-06-24 ENCOUNTER — TELEPHONE (OUTPATIENT)
Dept: FAMILY MEDICINE | Facility: CLINIC | Age: 25
End: 2025-06-24
Payer: MEDICAID

## 2025-06-24 NOTE — TELEPHONE ENCOUNTER
----- Message from Laly Unger MD sent at 6/20/2025  5:48 AM CDT -----  Please notify patient that results have returned, and they are normal. Like we discussed - it's a good idea to get anca started on an over the counter magnesium vitamin. His level is low normal.  Keppra level is okay though.    ----- Message -----  From: Lab, Background User  Sent: 6/17/2025   1:01 PM CDT  To: Laly Unger MD

## 2025-06-25 ENCOUNTER — OFFICE VISIT (OUTPATIENT)
Dept: NEUROLOGY | Facility: CLINIC | Age: 25
End: 2025-06-25
Payer: MEDICAID

## 2025-06-25 VITALS — HEIGHT: 63 IN | WEIGHT: 140 LBS | BODY MASS INDEX: 24.8 KG/M2

## 2025-06-25 DIAGNOSIS — G40.309 EPILEPSY SEIZURE, GENERALIZED, CONVULSIVE: Primary | ICD-10-CM

## 2025-06-25 PROCEDURE — 99999 PR PBB SHADOW E&M-EST. PATIENT-LVL III: CPT | Mod: PBBFAC,,, | Performed by: STUDENT IN AN ORGANIZED HEALTH CARE EDUCATION/TRAINING PROGRAM

## 2025-06-25 PROCEDURE — 99213 OFFICE O/P EST LOW 20 MIN: CPT | Mod: PBBFAC | Performed by: STUDENT IN AN ORGANIZED HEALTH CARE EDUCATION/TRAINING PROGRAM

## 2025-06-25 RX ORDER — LACOSAMIDE 100 MG/1
100 TABLET ORAL EVERY 12 HOURS
Qty: 180 TABLET | Refills: 3 | Status: SHIPPED | OUTPATIENT
Start: 2025-06-25 | End: 2026-06-25

## 2025-06-25 NOTE — PROGRESS NOTES
[facility]  [facility], [place]    Date: 6/25/25  Patient Name: Reed Middleton   MRN: [id number]   PCP: Laly Unger  Referring Provider: Varun Thakur MD    Assessment:   Reed Middleton is a 25 y.o. male presenting for seizures. 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 Never been officially diagnosed but events have been happening since childhood. Possible focal with secondary generalization vs non epileptic. Has tried zonisamide, lamotrigine but had side effects with these. Per report should be on keppra but I dont think this is necesarilly the best choice keeping in mind his psychiatric history. EEG in the past was normal but MRI showed small arachnoid cyst. Will repeat work up to see if this has grown. Given recent increase in seizure activity, will start vimpat. If not improvement and work up normal, will consider EMU. We spoke about seizures precautions and education. We will plan a follow up in 3 months from now. They verbalized understanding and agreed with the plan.     Plan:     - MRI brain w wo contrast   - EEG spot  - Change keppra to vimpat   - Avoid benadryl, tramadol, baclofen.   - Seizure precautions   - Follow up in 3 months    Problem List Items Addressed This Visit          Neuro    Epilepsy seizure, generalized, convulsive - Primary    Relevant Medications    lacosamide (VIMPAT) 100 mg Tab    Other Relevant Orders    MRI Brain Epilepsy W W/O Contrast    EEG,w/awake & asleep record     Oscar Orozco MD    Subjective:   Patient seen in consultation at the request of Varun Thakur MD for the evaluation of seizures. A copy of this note will be sent to the referring physician.      HPI 6/25/25:   Mr. Reed Middleton is a 25 y.o. male presenting for seizures. He has a history of seizure and ADHD. They stated that he has had GTCs and starring spells since he was a child. He has had  "a couple negative EEGs, MRI in 2018 showed a small arachnoid cyst. He was diagnosed as possible epilepsy and has tried zonisamide, lamotrigine but had side effects with these. He is currently be on keppra 750 bid but he feels it worsens his mood. His anxiety and depression are worse since starting this medication.     PAST MEDICAL HISTORY:  Past Medical History:   Diagnosis Date    ADHD (attention deficit hyperactivity disorder)     Epilepsy        PAST SURGICAL HISTORY:  Past Surgical History:   Procedure Laterality Date    ADENOIDECTOMY      TONSILLECTOMY         CURRENT MEDS:  Current Medications[1]    ALLERGIES:  Review of patient's allergies indicates:  No Known Allergies    FAMILY HISTORY:  Family History   Problem Relation Name Age of Onset    Suicide Mother Ellie     Hypertension Father En     Heart disease Father En     No Known Problems Half-brother Romel        SOCIAL HISTORY:  Social History[2]    Review of Systems:  12 system review of systems is negative except for the symptoms mentioned in HPI.      Objective:     Vitals:    06/25/25 1401   BP: (P) 120/72   BP Location: Right arm   Patient Position: Sitting   Pulse: (P) 71   Weight: 63.5 kg (139 lb 15.9 oz)   Height: 5' 3" (1.6 m)     General: NAD, well nourished   Eyes: no tearing, discharge, no erythema   ENT: moist mucous membranes of the oral cavity, nares patent    Neck: Supple, full range of motion  Cardiovascular: Warm and well perfused, pulses equal and symmetrical  Lungs: Normal work of breathing, normal chest wall excursions  Skin: No rash, lesions, or breakdown on exposed skin  Psychiatry: Mood and affect are appropriate   Abdomen: soft, non tender, non distended  Extremeties: No cyanosis, clubbing or edema.    Neurological   MENTAL STATUS: Alert and oriented to person, place, and time. Attention and concentration within normal limits. Speech without dysarthria, able to name and repeat without difficulty. Recent and remote " memory within normal limits   CRANIAL NERVES: Visual fields intact. PERRL. EOMI. Facial sensation intact. Face symmetrical. Hearing grossly intact. Full shoulder shrug bilaterally. Tongue protrudes midline   SENSORY: Sensation is intact to pin throughout.  Joint position perception intact. Negative Romberg.   MOTOR: Normal bulk and tone. No pronator drift.  5/5 deltoid, biceps, triceps, interosseous, hand  bilaterally. 5/5 iliopsoas, knee extension/flexion, foot dorsi/plantarflexion bilaterally.    REFLEXES: Symmetric and 2+ throughout. Toes down going bilaterally.   CEREBELLAR/COORDINATION/GAIT: Gait steady with normal arm swing and stride length.  Heel to shin intact. Finger to nose intact. Normal rapid alternating movements.     I spent a total of 45 minutes on the day of the visit.   This includes face to face time and non-face to face time preparing to see the patient (eg, review of tests), obtaining and/or reviewing separately obtained history, documenting clinical information in the electronic or other health record, independently interpreting results and communicating results to the patient/family/caregiver, or care coordinator.      Oscar Orozco MD  Neurology           [1]   Current Outpatient Medications   Medication Sig Dispense Refill    OLANZapine (ZYPREXA) 2.5 MG tablet Take 1 tablet (2.5 mg total) by mouth every evening. 30 tablet 11    sertraline (ZOLOFT) 50 MG tablet Take 1 tablet (50 mg total) by mouth once daily. 30 tablet 0    lacosamide (VIMPAT) 100 mg Tab Take 1 tablet (100 mg total) by mouth every 12 (twelve) hours. 180 tablet 3     No current facility-administered medications for this visit.   [2]   Social History  Tobacco Use    Smoking status: Every Day    Tobacco comments:     Vapes with nicotine   Vaping Use    Vaping status: Some Days    Substances: Nicotine, THC    Devices: Disposable   Substance Use Topics    Alcohol use: Yes     Alcohol/week: 12.0 standard drinks of alcohol      Types: 12 Cans of beer per week    Drug use: Not Currently     Frequency: 3.0 times per week     Types: Marijuana

## 2025-07-03 ENCOUNTER — HOSPITAL ENCOUNTER (OUTPATIENT)
Dept: NEUROLOGY | Facility: HOSPITAL | Age: 25
Discharge: HOME | End: 2025-07-03
Attending: STUDENT IN AN ORGANIZED HEALTH CARE EDUCATION/TRAINING PROGRAM
Payer: MEDICAID

## 2025-07-03 DIAGNOSIS — G40.309 EPILEPSY SEIZURE, GENERALIZED, CONVULSIVE: ICD-10-CM

## 2025-07-03 PROCEDURE — 95816 EEG AWAKE AND DROWSY: CPT

## 2025-07-07 ENCOUNTER — PATIENT MESSAGE (OUTPATIENT)
Dept: NEUROLOGY | Facility: CLINIC | Age: 25
End: 2025-07-07
Payer: MEDICAID

## 2025-07-12 ENCOUNTER — HOSPITAL ENCOUNTER (OUTPATIENT)
Dept: RADIOLOGY | Facility: HOSPITAL | Age: 25
Discharge: HOME | End: 2025-07-12
Attending: STUDENT IN AN ORGANIZED HEALTH CARE EDUCATION/TRAINING PROGRAM
Payer: MEDICAID

## 2025-07-12 DIAGNOSIS — G40.309 EPILEPSY SEIZURE, GENERALIZED, CONVULSIVE: ICD-10-CM

## 2025-07-12 PROCEDURE — 25500020 PHARM REV CODE 255: Performed by: STUDENT IN AN ORGANIZED HEALTH CARE EDUCATION/TRAINING PROGRAM

## 2025-07-12 PROCEDURE — 70553 MRI BRAIN STEM W/O & W/DYE: CPT | Mod: 26,,, | Performed by: RADIOLOGY

## 2025-07-12 PROCEDURE — 70553 MRI BRAIN STEM W/O & W/DYE: CPT | Mod: TC

## 2025-07-12 PROCEDURE — A9585 GADOBUTROL INJECTION: HCPCS | Performed by: STUDENT IN AN ORGANIZED HEALTH CARE EDUCATION/TRAINING PROGRAM

## 2025-07-12 RX ORDER — GADOBUTROL 604.72 MG/ML
6 INJECTION INTRAVENOUS
Status: COMPLETED | OUTPATIENT
Start: 2025-07-12 | End: 2025-07-12

## 2025-07-12 RX ADMIN — GADOBUTROL 10 ML: 604.72 INJECTION INTRAVENOUS at 09:07

## 2025-07-16 ENCOUNTER — PATIENT MESSAGE (OUTPATIENT)
Dept: NEUROLOGY | Facility: CLINIC | Age: 25
End: 2025-07-16
Payer: MEDICAID